# Patient Record
Sex: MALE | Race: WHITE | NOT HISPANIC OR LATINO | Employment: OTHER | URBAN - METROPOLITAN AREA
[De-identification: names, ages, dates, MRNs, and addresses within clinical notes are randomized per-mention and may not be internally consistent; named-entity substitution may affect disease eponyms.]

---

## 2017-11-03 ENCOUNTER — HOSPITAL ENCOUNTER (EMERGENCY)
Facility: HOSPITAL | Age: 72
Discharge: HOME | End: 2017-11-03
Attending: EMERGENCY MEDICINE | Admitting: EMERGENCY MEDICINE
Payer: MEDICARE

## 2017-11-03 ENCOUNTER — APPOINTMENT (EMERGENCY)
Dept: CT IMAGING | Facility: HOSPITAL | Age: 72
End: 2017-11-03
Payer: MEDICARE

## 2017-11-03 VITALS
HEART RATE: 60 BPM | BODY MASS INDEX: 22.28 KG/M2 | WEIGHT: 147 LBS | RESPIRATION RATE: 17 BRPM | OXYGEN SATURATION: 99 % | SYSTOLIC BLOOD PRESSURE: 122 MMHG | TEMPERATURE: 96.9 F | HEIGHT: 68 IN | DIASTOLIC BLOOD PRESSURE: 61 MMHG

## 2017-11-03 DIAGNOSIS — N30.90 CYSTITIS: Primary | ICD-10-CM

## 2017-11-03 DIAGNOSIS — K57.90 DIVERTICULOSIS: ICD-10-CM

## 2017-11-03 LAB
ANION GAP SERPL CALCULATED.3IONS-SCNC: 4 MMOL/L (ref 4–13)
BACTERIA UR QL AUTO: ABNORMAL /HPF
BASOPHILS # BLD AUTO: 0.04 THOUSANDS/ΜL (ref 0–0.1)
BASOPHILS NFR BLD AUTO: 1 % (ref 0–1)
BILIRUB UR QL STRIP: NEGATIVE
BUN SERPL-MCNC: 14 MG/DL (ref 5–25)
CALCIUM SERPL-MCNC: 9.3 MG/DL (ref 8.3–10.1)
CHLORIDE SERPL-SCNC: 103 MMOL/L (ref 100–108)
CLARITY UR: ABNORMAL
CO2 SERPL-SCNC: 32 MMOL/L (ref 21–32)
COLOR UR: ABNORMAL
CREAT SERPL-MCNC: 0.97 MG/DL (ref 0.6–1.3)
EOSINOPHIL # BLD AUTO: 0.35 THOUSAND/ΜL (ref 0–0.61)
EOSINOPHIL NFR BLD AUTO: 4 % (ref 0–6)
ERYTHROCYTE [DISTWIDTH] IN BLOOD BY AUTOMATED COUNT: 12.7 % (ref 11.6–15.1)
GFR SERPL CREATININE-BSD FRML MDRD: 77 ML/MIN/1.73SQ M
GLUCOSE SERPL-MCNC: 149 MG/DL (ref 65–140)
GLUCOSE UR STRIP-MCNC: NEGATIVE MG/DL
HCT VFR BLD AUTO: 42.6 % (ref 36.5–49.3)
HGB BLD-MCNC: 14.4 G/DL (ref 12–17)
HGB UR QL STRIP.AUTO: ABNORMAL
KETONES UR STRIP-MCNC: NEGATIVE MG/DL
LEUKOCYTE ESTERASE UR QL STRIP: ABNORMAL
LYMPHOCYTES # BLD AUTO: 2.16 THOUSANDS/ΜL (ref 0.6–4.47)
LYMPHOCYTES NFR BLD AUTO: 25 % (ref 14–44)
MCH RBC QN AUTO: 31.6 PG (ref 26.8–34.3)
MCHC RBC AUTO-ENTMCNC: 33.8 G/DL (ref 31.4–37.4)
MCV RBC AUTO: 94 FL (ref 82–98)
MONOCYTES # BLD AUTO: 0.58 THOUSAND/ΜL (ref 0.17–1.22)
MONOCYTES NFR BLD AUTO: 7 % (ref 4–12)
NEUTROPHILS # BLD AUTO: 5.53 THOUSANDS/ΜL (ref 1.85–7.62)
NEUTS SEG NFR BLD AUTO: 63 % (ref 43–75)
NITRITE UR QL STRIP: NEGATIVE
NON-SQ EPI CELLS URNS QL MICRO: ABNORMAL /HPF
OTHER STN SPEC: ABNORMAL
PH UR STRIP.AUTO: 7.5 [PH] (ref 4.5–8)
PLATELET # BLD AUTO: 305 THOUSANDS/UL (ref 149–390)
PMV BLD AUTO: 9.2 FL (ref 8.9–12.7)
POTASSIUM SERPL-SCNC: 4.4 MMOL/L (ref 3.5–5.3)
PROT UR STRIP-MCNC: ABNORMAL MG/DL
RBC # BLD AUTO: 4.55 MILLION/UL (ref 3.88–5.62)
RBC #/AREA URNS AUTO: ABNORMAL /HPF
SODIUM SERPL-SCNC: 139 MMOL/L (ref 136–145)
SP GR UR STRIP.AUTO: 1.01 (ref 1–1.03)
URINE COMMENT: ABNORMAL
UROBILINOGEN UR QL STRIP.AUTO: 0.2 E.U./DL
WBC # BLD AUTO: 8.66 THOUSAND/UL (ref 4.31–10.16)
WBC #/AREA URNS AUTO: ABNORMAL /HPF

## 2017-11-03 PROCEDURE — 85025 COMPLETE CBC W/AUTO DIFF WBC: CPT | Performed by: EMERGENCY MEDICINE

## 2017-11-03 PROCEDURE — 36415 COLL VENOUS BLD VENIPUNCTURE: CPT | Performed by: EMERGENCY MEDICINE

## 2017-11-03 PROCEDURE — 81001 URINALYSIS AUTO W/SCOPE: CPT

## 2017-11-03 PROCEDURE — 80048 BASIC METABOLIC PNL TOTAL CA: CPT | Performed by: EMERGENCY MEDICINE

## 2017-11-03 PROCEDURE — 87086 URINE CULTURE/COLONY COUNT: CPT

## 2017-11-03 PROCEDURE — 99284 EMERGENCY DEPT VISIT MOD MDM: CPT

## 2017-11-03 PROCEDURE — 74177 CT ABD & PELVIS W/CONTRAST: CPT

## 2017-11-03 RX ORDER — MULTIVITAMIN
1 CAPSULE ORAL DAILY
COMMUNITY

## 2017-11-03 RX ORDER — ASPIRIN 81 MG/1
81 TABLET, CHEWABLE ORAL DAILY
COMMUNITY

## 2017-11-03 RX ORDER — CEPHALEXIN 250 MG/1
500 CAPSULE ORAL ONCE
Status: COMPLETED | OUTPATIENT
Start: 2017-11-03 | End: 2017-11-03

## 2017-11-03 RX ORDER — CEPHALEXIN 500 MG/1
500 CAPSULE ORAL 4 TIMES DAILY
Qty: 28 CAPSULE | Refills: 0 | Status: SHIPPED | OUTPATIENT
Start: 2017-11-03 | End: 2017-11-10

## 2017-11-03 RX ORDER — CHLORAL HYDRATE 500 MG
1000 CAPSULE ORAL DAILY
COMMUNITY

## 2017-11-03 RX ORDER — LEVOTHYROXINE SODIUM 0.03 MG/1
100 TABLET ORAL DAILY
COMMUNITY

## 2017-11-03 RX ORDER — EZETIMIBE 10 MG/1
10 TABLET ORAL DAILY
COMMUNITY

## 2017-11-03 RX ORDER — ATORVASTATIN CALCIUM 80 MG/1
80 TABLET, FILM COATED ORAL DAILY
COMMUNITY

## 2017-11-03 RX ADMIN — IOHEXOL 100 ML: 350 INJECTION, SOLUTION INTRAVENOUS at 09:33

## 2017-11-03 RX ADMIN — CEPHALEXIN 500 MG: 250 CAPSULE ORAL at 09:58

## 2017-11-03 NOTE — ED PROVIDER NOTES
History  Chief Complaint   Patient presents with    Blood in Urine     Patient reports hematuria weith small red clots since this morning  Admits to burning, urgency, and frequency with urination  Denies abdominal or suprapubic pain  Denies hx of problems with prostate  Denies fever, chills, n, v, d       History provided by:  Patient   used: No        Patient is a 44-year-old male presenting to emergency department with hematuria  Started today  Gross hematuria  Burning  No fevers or chills  No nausea or vomiting  No chest pain  Does feel lightheaded  Not on blood thinners   Except for aspirin  No trauma  No history of malignancy  Former smoker  No bladder or kidney problems in the past       MDM  Concern for renal mass, will check urine, CT abdomen pelvis to evaluate for renal and bladder masses, CBC BMP      Prior to Admission Medications   Prescriptions Last Dose Informant Patient Reported? Taking? Multiple Vitamin (MULTIVITAMIN) capsule   Yes Yes   Sig: Take 1 capsule by mouth daily   Omega-3 Fatty Acids (FISH OIL) 1,000 mg   Yes Yes   Sig: Take 1,000 mg by mouth daily   aspirin 81 mg chewable tablet   Yes Yes   Sig: Chew 81 mg daily   atorvastatin (LIPITOR) 80 mg tablet   Yes Yes   Sig: Take 80 mg by mouth daily   co-enzyme Q-10 30 MG capsule   Yes Yes   Sig: Take 100 mg by mouth daily   ezetimibe (ZETIA) 10 mg tablet   Yes Yes   Sig: Take 10 mg by mouth daily   levothyroxine 25 mcg tablet   Yes Yes   Sig: Take 25 mcg by mouth daily   metoprolol tartrate (LOPRESSOR) 25 mg tablet   Yes Yes   Sig: Take 25 mg by mouth every 12 (twelve) hours      Facility-Administered Medications: None       Past Medical History:   Diagnosis Date    Disease of thyroid gland     Hyperlipidemia     Hypertension        Past Surgical History:   Procedure Laterality Date    HERNIA REPAIR         History reviewed  No pertinent family history    I have reviewed and agree with the history as documented  Social History   Substance Use Topics    Smoking status: Former Smoker    Smokeless tobacco: Never Used    Alcohol use Yes      Comment: socially         Review of Systems   Constitutional: Negative for chills, diaphoresis and fever  HENT: Negative for congestion and sore throat  Respiratory: Negative for cough, shortness of breath, wheezing and stridor  Cardiovascular: Negative for chest pain, palpitations and leg swelling  Gastrointestinal: Negative for abdominal pain, blood in stool, diarrhea, nausea and vomiting  Genitourinary: Positive for dysuria and hematuria  Negative for frequency and urgency  Musculoskeletal: Negative for back pain, neck pain and neck stiffness  Skin: Negative for pallor and rash  Neurological: Negative for dizziness, syncope, weakness, light-headedness and headaches  All other systems reviewed and are negative  Physical Exam  ED Triage Vitals [11/03/17 0759]   Temperature Pulse Respirations Blood Pressure SpO2   (!) 96 9 °F (36 1 °C) 68 18 154/74 97 %      Temp Source Heart Rate Source Patient Position - Orthostatic VS BP Location FiO2 (%)   Temporal Monitor Sitting Right arm --      Pain Score       No Pain           Orthostatic Vital Signs  Vitals:    11/03/17 0759 11/03/17 0900   BP: 154/74 122/61   Pulse: 68 60   Patient Position - Orthostatic VS: Sitting Sitting       Physical Exam   Constitutional: He is oriented to person, place, and time  He appears well-developed and well-nourished  HENT:   Head: Normocephalic and atraumatic  Eyes: EOM are normal  Pupils are equal, round, and reactive to light  Neck: Normal range of motion  Neck supple  Cardiovascular: Normal rate, regular rhythm, normal heart sounds and intact distal pulses  Pulmonary/Chest: Effort normal and breath sounds normal  No respiratory distress  Abdominal: Soft  Bowel sounds are normal  There is no tenderness  Musculoskeletal: Normal range of motion   He exhibits no edema or tenderness  Neurological: He is alert and oriented to person, place, and time  Skin: Skin is warm and dry  Capillary refill takes less than 2 seconds  No erythema  No pallor  Vitals reviewed  ED Medications  Medications   iohexol (OMNIPAQUE) 350 MG/ML injection (MULTI-DOSE) 100 mL (100 mL Intravenous Given 11/3/17 0933)   cephalexin (KEFLEX) capsule 500 mg (500 mg Oral Given 11/3/17 0958)       Diagnostic Studies  Results Reviewed     Procedure Component Value Units Date/Time    Urine Microscopic [[id number]]  (Abnormal) Collected:  11/03/17 0809    Lab Status:  Final result Specimen:  Urine from Urine, Clean Catch Updated:  11/03/17 0856     RBC, UA Innumerable (A) /hpf      WBC, UA 30-50 (A) /hpf      Epithelial Cells Occasional /hpf      Bacteria, UA Occasional /hpf      OTHER OBSERVATIONS WBCs Clumped  Renal Epithelial Cells Present  Renal Tubule Epithelial Cells Present    Urine culture [[id number]] Collected:  11/03/17 0809    Lab Status:   In process Specimen:  Urine from Urine, Clean Catch Updated:  11/03/17 0856    UA w Reflex to Microscopic w Reflex to Culture [[id number]]  (Abnormal) Collected:  11/03/17 0809    Lab Status:  Final result Specimen:  Urine from Urine, Clean Catch Updated:  11/03/17 0851     Color, UA Red     Clarity, UA Turbid     Specific Holden, UA 1 010     pH, UA 7 5     Leukocytes, UA Small (A)     Nitrite, UA Negative     Protein, UA >=1000 (4+) (A) mg/dl      Glucose, UA Negative mg/dl      Ketones, UA Negative mg/dl      Urobilinogen, UA 0 2 E U /dl      Bilirubin, UA Negative     Blood, UA Large (A)     URINE COMMENT Dipstick results analyzed manually on supernatant due to gross amount of blood present-byron    Basic metabolic panel [[id number]]  (Abnormal) Collected:  11/03/17 0820    Lab Status:  Final result Specimen:  Blood from Arm, Left Updated:  11/03/17 0844     Sodium 139 mmol/L      Potassium 4 4 mmol/L      Chloride 103 mmol/L      CO2 32 mmol/L      Anion Gap 4 mmol/L      BUN 14 mg/dL      Creatinine 0 97 mg/dL      Glucose 149 (H) mg/dL      Calcium 9 3 mg/dL      eGFR 77 ml/min/1 73sq m     Narrative:         National Kidney Disease Education Program recommendations are as follows:  GFR calculation is accurate only with a steady state creatinine  Chronic Kidney disease less than 60 ml/min/1 73 sq  meters  Kidney failure less than 15 ml/min/1 73 sq  meters  CBC and differential [[id number]]  (Normal) Collected:  11/03/17 0820    Lab Status:  Final result Specimen:  Blood from Arm, Left Updated:  11/03/17 0833     WBC 8 66 Thousand/uL      RBC 4 55 Million/uL      Hemoglobin 14 4 g/dL      Hematocrit 42 6 %      MCV 94 fL      MCH 31 6 pg      MCHC 33 8 g/dL      RDW 12 7 %      MPV 9 2 fL      Platelets 920 Thousands/uL      Neutrophils Relative 63 %      Lymphocytes Relative 25 %      Monocytes Relative 7 %      Eosinophils Relative 4 %      Basophils Relative 1 %      Neutrophils Absolute 5 53 Thousands/µL      Lymphocytes Absolute 2 16 Thousands/µL      Monocytes Absolute 0 58 Thousand/µL      Eosinophils Absolute 0 35 Thousand/µL      Basophils Absolute 0 04 Thousands/µL                  CT abdomen pelvis with contrast   Final Result by Mavis Daily, DO (11/03 1026)      Thickening of the urinary bladder wall suspicious for cystitis  Correlate with urinalysis  Small right posterolateral bladder wall diverticulum  2 mm nonobstructing right renal calculus  Descending and sigmoid colon diverticulosis without evidence of diverticulitis           Workstation performed: [id number]                    Procedures  Procedures       Phone Contacts  ED Phone Contact    ED Course  ED Course                                MDM  CritCare Time    Disposition  Final diagnoses:   Cystitis   Diverticulosis     Time reflects when diagnosis was documented in both MDM as applicable and the Disposition within this note     Time User Action Codes Description Comment 11/3/2017  9:50 AM Ava Salmeron Add [N30 90] Cystitis     11/3/2017  9:50 AM Ava Hernandez Add [K57 90] Diverticulosis       ED Disposition     ED Disposition Condition Comment    Discharge  Deneise Boyle discharge to home/self care  Condition at discharge: Good        Follow-up Information     Follow up With Specialties Details Why Contact Info Additional Information    [facility] Emergency Department Emergency Medicine  As needed, If symptoms worsen, pain, vomiting, fevers, chills, lightheaded or feeling worse overall [address]  [address] [facility] [address], [place], [place], [address] for Urology [place]  In 1 week  re-evaluation  of bladder [address]    [address]      Alem Garay MD Family Medicine In 3 days  if unable to get a appointment Urology [address] [facility] [address]           Discharge Medication List as of 11/3/2017  9:52 AM      START taking these medications    Details   cephalexin (KEFLEX) 500 mg capsule Take 1 capsule by mouth 4 (four) times a day for 7 days, Starting Fri 11/3/2017, Until Fri 11/10/2017, Print         CONTINUE these medications which have NOT CHANGED    Details   aspirin 81 mg chewable tablet Chew 81 mg daily, Historical Med      atorvastatin (LIPITOR) 80 mg tablet Take 80 mg by mouth daily, Historical Med      co-enzyme Q-10 30 MG capsule Take 100 mg by mouth daily, Historical Med      ezetimibe (ZETIA) 10 mg tablet Take 10 mg by mouth daily, Historical Med      levothyroxine 25 mcg tablet Take 25 mcg by mouth daily, Historical Med      metoprolol tartrate (LOPRESSOR) 25 mg tablet Take 25 mg by mouth every 12 (twelve) hours, Historical Med      Multiple Vitamin (MULTIVITAMIN) capsule Take 1 capsule by mouth daily, Historical Med      Omega-3 Fatty Acids (FISH OIL) 1,000 mg Take 1,000 mg by mouth daily, Historical Med           No discharge procedures on file      ED Provider  Electronically Signed by           David Saenz MD  11/03/17 1007

## 2017-11-03 NOTE — DISCHARGE INSTRUCTIONS
Urinary Tract Infection in Men   WHAT YOU NEED TO KNOW:   What is a urinary tract infection (UTI)? A UTI is caused by bacteria that get inside your urinary tract  Most bacteria that enter your urinary tract come out when you urinate  If the bacteria stay in your urinary tract, you may get an infection  Your urinary tract includes your kidneys, ureters, bladder, and urethra  Urine is made in your kidneys, and it flows from the ureters to the bladder  Urine leaves the bladder through the urethra  A UTI is more common in your lower urinary tract, which includes your bladder and urethra  What increases my risk for a UTI? · A urinary catheter or self-catheterization    · Incontinence (not able to control when you urinate)    · Urinary tract problems, such as narrowing, kidney stones, or not being able to empty your bladder completely    · Not being circumcised    · Past UTI or urinary tract surgery    · Elderly age    · Obesity or diabetes  What are the signs and symptoms of a UTI? · Urinating more often than usual, leaking urine, or waking from sleep to urinate    · Pain or burning when you urinate    · Pain or pressure in your lower abdomen or back    · Urine that smells bad    · Blood in your urine  How is a UTI diagnosed? Your healthcare provider will ask about your signs and symptoms  Your provider may press on your abdomen, sides, and back to check if you feel pain  Your urine will be tested for bacteria that may be causing your infection  If you have UTIs often, you may need other tests to find the cause  How is a UTI treated? · Antibiotics  help fight a bacterial infection  · Medicines  may be given to decrease pain and burning when you urinate  They will also help decrease the feeling that you need to urinate often  These medicines will make your urine orange or red  What can I do to prevent a UTI? · Empty your bladder often    Urinate and empty your bladder as soon as you feel the need  Do not hold your urine for long periods of time  · Drink liquids as directed  Ask how much liquid to drink each day and which liquids are best for you  You may need to drink more liquids than usual to help flush out the bacteria  Do not drink alcohol, caffeine, or citrus juices  These can irritate your bladder and increase your symptoms  Your healthcare provider may recommend cranberry juice to help prevent a UTI  · Urinate after you have sex  This can help flush out bacteria passed during sex  · Do pelvic muscle exercises often  Pelvic muscle exercises may help you start and stop urinating  Strong pelvic muscles may help you empty your bladder easier  Squeeze these muscles tightly for 5 seconds like you are trying to hold back urine  Then relax for 5 seconds  Gradually work up to squeezing for 10 seconds  Do 3 sets of 15 repetitions a day, or as directed  When should I seek immediate care? · You are urinating very little or not at all  · You have a high fever with shaking chills  · You have side or back pain that gets worse  When should I contact my healthcare provider? · You have a mild fever  · You do not feel better after 2 days of taking antibiotics  · You are vomiting  · You have new symptoms, such as blood or pus in your urine  · You have questions or concerns about your condition or care  CARE AGREEMENT:   You have the right to help plan your care  Learn about your health condition and how it may be treated  Discuss treatment options with your caregivers to decide what care you want to receive  You always have the right to refuse treatment  The above information is an  only  It is not intended as medical advice for individual conditions or treatments  Talk to your doctor, nurse or pharmacist before following any medical regimen to see if it is safe and effective for you    © 2017 [employer] Information is for End User's use only and may not be sold, redistributed or otherwise used for commercial purposes  All illustrations and images included in CareNotes® are the copyrighted property of A D A M , Inc  or [facility]

## 2017-11-03 NOTE — ED NOTES
Patient resting comfortably in bed without difficulty  Will continue to monitor        Mohamud Florez, NEYDA  11/03/17 7840

## 2017-11-04 LAB — BACTERIA UR CULT: NORMAL

## 2018-03-21 ENCOUNTER — HOSPITAL ENCOUNTER (EMERGENCY)
Facility: HOSPITAL | Age: 73
Discharge: HOME | End: 2018-03-21
Attending: EMERGENCY MEDICINE
Payer: MEDICARE

## 2018-03-21 VITALS
OXYGEN SATURATION: 98 % | DIASTOLIC BLOOD PRESSURE: 71 MMHG | HEART RATE: 75 BPM | WEIGHT: 160 LBS | BODY MASS INDEX: 24.33 KG/M2 | TEMPERATURE: 98.5 F | SYSTOLIC BLOOD PRESSURE: 136 MMHG | RESPIRATION RATE: 16 BRPM

## 2018-03-21 DIAGNOSIS — F41.9 ANXIETY: Primary | ICD-10-CM

## 2018-03-21 DIAGNOSIS — E03.9 HYPOTHYROIDISM: ICD-10-CM

## 2018-03-21 LAB
ANION GAP SERPL CALCULATED.3IONS-SCNC: 11 MMOL/L (ref 4–13)
BASOPHILS # BLD AUTO: 0.04 THOUSANDS/ΜL (ref 0–0.1)
BASOPHILS NFR BLD AUTO: 1 % (ref 0–1)
BUN SERPL-MCNC: 14 MG/DL (ref 5–25)
CALCIUM SERPL-MCNC: 9.2 MG/DL (ref 8.3–10.1)
CHLORIDE SERPL-SCNC: 102 MMOL/L (ref 100–108)
CO2 SERPL-SCNC: 28 MMOL/L (ref 21–32)
CREAT SERPL-MCNC: 1 MG/DL (ref 0.6–1.3)
EOSINOPHIL # BLD AUTO: 0.13 THOUSAND/ΜL (ref 0–0.61)
EOSINOPHIL NFR BLD AUTO: 2 % (ref 0–6)
ERYTHROCYTE [DISTWIDTH] IN BLOOD BY AUTOMATED COUNT: 12.9 % (ref 11.6–15.1)
GFR SERPL CREATININE-BSD FRML MDRD: 74 ML/MIN/1.73SQ M
GLUCOSE SERPL-MCNC: 206 MG/DL (ref 65–140)
HCT VFR BLD AUTO: 43.5 % (ref 36.5–49.3)
HGB BLD-MCNC: 15 G/DL (ref 12–17)
LYMPHOCYTES # BLD AUTO: 3.41 THOUSANDS/ΜL (ref 0.6–4.47)
LYMPHOCYTES NFR BLD AUTO: 46 % (ref 14–44)
MCH RBC QN AUTO: 32.2 PG (ref 26.8–34.3)
MCHC RBC AUTO-ENTMCNC: 34.5 G/DL (ref 31.4–37.4)
MCV RBC AUTO: 93 FL (ref 82–98)
MONOCYTES # BLD AUTO: 0.66 THOUSAND/ΜL (ref 0.17–1.22)
MONOCYTES NFR BLD AUTO: 9 % (ref 4–12)
NEUTROPHILS # BLD AUTO: 3.06 THOUSANDS/ΜL (ref 1.85–7.62)
NEUTS SEG NFR BLD AUTO: 42 % (ref 43–75)
PLATELET # BLD AUTO: 248 THOUSANDS/UL (ref 149–390)
PMV BLD AUTO: 9.7 FL (ref 8.9–12.7)
POTASSIUM SERPL-SCNC: 3.9 MMOL/L (ref 3.5–5.3)
RBC # BLD AUTO: 4.66 MILLION/UL (ref 3.88–5.62)
SODIUM SERPL-SCNC: 141 MMOL/L (ref 136–145)
TSH SERPL DL<=0.05 MIU/L-ACNC: 7.64 UIU/ML (ref 0.36–3.74)
WBC # BLD AUTO: 7.3 THOUSAND/UL (ref 4.31–10.16)

## 2018-03-21 PROCEDURE — 85025 COMPLETE CBC W/AUTO DIFF WBC: CPT | Performed by: EMERGENCY MEDICINE

## 2018-03-21 PROCEDURE — 36415 COLL VENOUS BLD VENIPUNCTURE: CPT | Performed by: EMERGENCY MEDICINE

## 2018-03-21 PROCEDURE — 80048 BASIC METABOLIC PNL TOTAL CA: CPT | Performed by: EMERGENCY MEDICINE

## 2018-03-21 PROCEDURE — 84443 ASSAY THYROID STIM HORMONE: CPT | Performed by: EMERGENCY MEDICINE

## 2018-03-21 PROCEDURE — 99283 EMERGENCY DEPT VISIT LOW MDM: CPT

## 2018-03-22 NOTE — DISCHARGE INSTRUCTIONS
Anxiety   WHAT YOU NEED TO KNOW:   Anxiety is a condition that causes you to feel extremely worried or nervous  The feelings are so strong that they can cause problems with your daily activities or sleep  Anxiety may be triggered by something you fear, or it may happen without a cause  Family or work stress, smoking, caffeine, and alcohol can increase your risk for anxiety  Certain medicines or health conditions can also increase your risk  Anxiety can become a long-term condition if it is not managed or treated  YOU MAY FIND RELIEF BY PRACTICING MINDFUL MEDITATION  LOOK UP THE WORKS OF Marlene Lawrence  HE AND OTHERS OFFER BOOKS, WORKSHOPS AND HOME BASED MEDITATION TRAINING  DISCHARGE INSTRUCTIONS:   Call 911 if:   · You have chest pain, tightness, or heaviness that may spread to your shoulders, arms, jaw, neck, or back  · You feel like hurting yourself or someone else  Contact your healthcare provider if:   · Your symptoms get worse or do not get better with treatment  · Your anxiety keeps you from doing your regular daily activities  · You have new symptoms since your last visit  · You have questions or concerns about your condition or care  Medicines:   · Medicines  may be given to help you feel more calm and relaxed, and decrease your symptoms  · Take your medicine as directed  Contact your healthcare provider if you think your medicine is not helping or if you have side effects  Tell him of her if you are allergic to any medicine  Keep a list of the medicines, vitamins, and herbs you take  Include the amounts, and when and why you take them  Bring the list or the pill bottles to follow-up visits  Carry your medicine list with you in case of an emergency  Follow up with your healthcare provider within 2 weeks or as directed:  Write down your questions so you remember to ask them during your visits  Manage anxiety:   · Talk to someone about your anxiety    Your healthcare provider may suggest counseling  Cognitive behavioral therapy can help you understand and change how you react to events that trigger your symptoms  You might feel more comfortable talking with a friend or family member about your anxiety  Choose someone you know will be supportive and encouraging  · Find ways to relax  Activities such as exercise, meditation, or listening to music can help you relax  Spend time with friends, or do things you enjoy  · Practice deep breathing  Deep breathing can help you relax when you feel anxious  Focus on taking slow, deep breaths several times a day, or during an anxiety attack  Breathe in through your nose and out through your mouth  · Create a regular sleep routine  Regular sleep can help you feel calmer during the day  Go to sleep and wake up at the same times every day  Do not watch television or use the computer right before bed  Your room should be comfortable, dark, and quiet  · Eat a variety of healthy foods  Healthy foods include fruits, vegetables, low-fat dairy products, lean meats, fish, whole-grain breads, and cooked beans  Healthy foods can help you feel less anxious and have more energy  · Exercise regularly  Exercise can increase your energy level  Exercise may also lift your mood and help you sleep better  Your healthcare provider can help you create an exercise plan  · Do not smoke  Nicotine and other chemicals in cigarettes and cigars can increase anxiety  Ask your healthcare provider for information if you currently smoke and need help to quit  E-cigarettes or smokeless tobacco still contain nicotine  Talk to your healthcare provider before you use these products  · Do not have caffeine  Caffeine can make your symptoms worse  Do not have foods or drinks that are meant to increase your energy level  · Limit or do not drink alcohol  Ask your healthcare provider if alcohol is safe for you   You may not be able to drink alcohol if you take certain anxiety or depression medicines  Limit alcohol to 1 drink per day if you are a woman  Limit alcohol to 2 drinks per day if you are a man  A drink of alcohol is 12 ounces of beer, 5 ounces of wine, or 1½ ounces of liquor  · Do not use drugs  Drugs can make your anxiety worse  It can also make anxiety hard to manage  Talk to your healthcare provider if you use drugs and want help to quit  © 2017 [facility] Information is for End User's use only and may not be sold, redistributed or otherwise used for commercial purposes  All illustrations and images included in CareNotes® are the copyrighted property of A D A M , Inc  or Jose Luis Chávez  The above information is an  only  It is not intended as medical advice for individual conditions or treatments  Talk to your doctor, nurse or pharmacist before following any medical regimen to see if it is safe and effective for you

## 2018-03-22 NOTE — ED NOTES
Pt has had no shaking since I took his care at 2300  He verbalized understanding to follow up with his PCP regarding his lab results, mainly TSH       Sarah Rossi RN  03/21/18 0474
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
## 2018-12-17 ENCOUNTER — HOSPITAL ENCOUNTER (EMERGENCY)
Facility: HOSPITAL | Age: 73
Discharge: HOME | End: 2018-12-17
Attending: EMERGENCY MEDICINE
Payer: MEDICARE

## 2018-12-17 VITALS
BODY MASS INDEX: 22.5 KG/M2 | WEIGHT: 148 LBS | HEART RATE: 63 BPM | DIASTOLIC BLOOD PRESSURE: 67 MMHG | OXYGEN SATURATION: 97 % | RESPIRATION RATE: 20 BRPM | SYSTOLIC BLOOD PRESSURE: 135 MMHG

## 2018-12-17 DIAGNOSIS — T78.40XA ALLERGIC REACTION, INITIAL ENCOUNTER: Primary | ICD-10-CM

## 2018-12-17 PROCEDURE — 99283 EMERGENCY DEPT VISIT LOW MDM: CPT

## 2018-12-17 RX ORDER — PREDNISONE 20 MG/1
20 TABLET ORAL DAILY
Qty: 8 TABLET | Refills: 0 | Status: SHIPPED | OUTPATIENT
Start: 2018-12-17 | End: 2018-12-21

## 2018-12-17 RX ORDER — FAMOTIDINE 20 MG/1
20 TABLET, FILM COATED ORAL 2 TIMES DAILY
Qty: 4 TABLET | Refills: 0 | Status: SHIPPED | OUTPATIENT
Start: 2018-12-17 | End: 2019-08-12

## 2018-12-17 RX ORDER — PYRIDOXINE HCL (VITAMIN B6) 100 MG
500 TABLET ORAL DAILY
COMMUNITY

## 2018-12-17 RX ORDER — DIPHENHYDRAMINE HCL 25 MG
50 TABLET ORAL ONCE
Status: COMPLETED | OUTPATIENT
Start: 2018-12-17 | End: 2018-12-17

## 2018-12-17 RX ORDER — EPINEPHRINE 0.3 MG/.3ML
0.3 INJECTION SUBCUTANEOUS ONCE
Qty: 2 EACH | Refills: 0 | Status: SHIPPED | OUTPATIENT
Start: 2018-12-17 | End: 2018-12-17

## 2018-12-17 RX ORDER — PREDNISONE 20 MG/1
40 TABLET ORAL ONCE
Status: COMPLETED | OUTPATIENT
Start: 2018-12-17 | End: 2018-12-17

## 2018-12-17 RX ORDER — FAMOTIDINE 20 MG/1
20 TABLET, FILM COATED ORAL ONCE
Status: COMPLETED | OUTPATIENT
Start: 2018-12-17 | End: 2018-12-17

## 2018-12-17 RX ORDER — DIPHENHYDRAMINE HCL 25 MG
50 TABLET ORAL EVERY 8 HOURS PRN
Qty: 20 TABLET | Refills: 0 | Status: SHIPPED | OUTPATIENT
Start: 2018-12-17

## 2018-12-17 RX ADMIN — PREDNISONE 40 MG: 20 TABLET ORAL at 22:41

## 2018-12-17 RX ADMIN — DIPHENHYDRAMINE HCL 50 MG: 25 TABLET ORAL at 22:40

## 2018-12-17 RX ADMIN — FAMOTIDINE 20 MG: 20 TABLET ORAL at 22:40

## 2018-12-18 NOTE — ED PROVIDER NOTES
History  Chief Complaint   Patient presents with    Allergic Reaction     pt states 2030 his bottom lip swelled up after eating cookies  Pt also states rash began on back of right thigh  69-year-old male presents for evaluation of pruritic rash to the buttocks and swelling of the bottom lip shortly after eating cookies the by a family member at 8:30 p m  this evening  Patient denies history of similar reactions in the past   No known allergies  He does not take any ACE inhibitors  No recent medication changes; however, patient has recently started taking cranberry extract  Patient denies any shortness of breath, tongue or throat swelling  No nausea or vomiting  No diarrhea  No recent illness  History provided by:  Patient  Allergic Reaction   Presenting symptoms: itching, rash and swelling    Itching:     Location: buttocks  Severity:  Mild    Onset quality:  Sudden    Duration:  2 hours    Timing:  Constant    Progression:  Unchanged  Rash:     Location: buttocks  Quality: itchiness and redness      Severity:  Mild    Onset quality:  Sudden    Duration:  2 hours    Timing:  Constant  Swelling:     Location: lower lip  Onset quality:  Sudden    Duration:  2 hours    Timing:  Constant    Progression:  Unchanged    Chronicity:  New  Severity:  Mild  Duration:  2 hours  Prior allergic episodes:  No prior episodes  Context comment:  Homemade cookies  Relieved by:  None tried  Worsened by:  Nothing  Ineffective treatments:  None tried      Prior to Admission Medications   Prescriptions Last Dose Informant Patient Reported? Taking?    Cranberry 500 MG CAPS   Yes Yes   Sig: Take 500 mg by mouth daily   Multiple Vitamin (MULTIVITAMIN) capsule   Yes No   Sig: Take 1 capsule by mouth daily   Omega-3 Fatty Acids (FISH OIL) 1,000 mg   Yes No   Sig: Take 1,000 mg by mouth daily   aspirin 81 mg chewable tablet   Yes No   Sig: Chew 81 mg daily   atorvastatin (LIPITOR) 80 mg tablet   Yes No   Sig: Take 80 mg by mouth daily   co-enzyme Q-10 30 MG capsule   Yes No   Sig: Take 100 mg by mouth daily   ezetimibe (ZETIA) 10 mg tablet   Yes No   Sig: Take 10 mg by mouth daily   levothyroxine 25 mcg tablet   Yes No   Sig: Take 25 mcg by mouth daily   metoprolol tartrate (LOPRESSOR) 25 mg tablet   Yes No   Sig: Take 25 mg by mouth every 12 (twelve) hours      Facility-Administered Medications: None       Past Medical History:   Diagnosis Date    Disease of thyroid gland     Hematuria     Hyperlipidemia     Hypertension        Past Surgical History:   Procedure Laterality Date    HERNIA REPAIR         History reviewed  No pertinent family history  I have reviewed and agree with the history as documented  Social History   Substance Use Topics    Smoking status: Former Smoker    Smokeless tobacco: Never Used    Alcohol use No        Review of Systems   Constitutional: Negative for appetite change, diaphoresis, fatigue and fever  HENT: Negative for congestion, rhinorrhea and sore throat  Respiratory: Negative for cough, chest tightness and shortness of breath  Cardiovascular: Negative for chest pain, palpitations and leg swelling  Gastrointestinal: Negative for abdominal pain, constipation, diarrhea, nausea and vomiting  Genitourinary: Negative for difficulty urinating, dysuria, frequency and hematuria  Musculoskeletal: Negative for myalgias, neck pain and neck stiffness  Skin: Positive for itching and rash  Negative for pallor  Neurological: Negative for syncope, weakness and headaches  All other systems reviewed and are negative  Physical Exam  Physical Exam   Constitutional: He appears well-developed and well-nourished  Non-toxic appearance  No distress  HENT:   Head: Normocephalic and atraumatic  Mouth/Throat: Uvula is midline and oropharynx is clear and moist    Swelling of the lower lip   Eyes: Pupils are equal, round, and reactive to light   EOM are normal    Neck: Normal range of motion  No tracheal deviation present  No thyromegaly present  Cardiovascular: Normal rate, regular rhythm, normal heart sounds and intact distal pulses  Pulmonary/Chest: Effort normal and breath sounds normal    Abdominal: Soft  Bowel sounds are normal  He exhibits no distension  There is no tenderness  Lymphadenopathy:     He has no cervical adenopathy  Skin: Skin is warm and dry  He is not diaphoretic  Erythematous, blanchable, wheal and flare rash to the inferior portion of bilateral buttocks extending to the upper thigh   Nursing note and vitals reviewed  Vital Signs  ED Triage Vitals   Temp Pulse Respirations Blood Pressure SpO2   -- 12/17/18 2223 12/17/18 2223 12/17/18 2223 12/17/18 2223    68 18 (!) 176/78 99 %      Temp src Heart Rate Source Patient Position - Orthostatic VS BP Location FiO2 (%)   -- 12/17/18 2223 12/17/18 2237 12/17/18 2237 --    Monitor Lying Right arm       Pain Score       12/17/18 2223       No Pain           Vitals:    12/17/18 2223 12/17/18 2237 12/17/18 2245 12/17/18 2300   BP: (!) 176/78   135/67   Pulse: 68   63   Patient Position - Orthostatic VS:  Lying Lying Lying       Visual Acuity      ED Medications  Medications   diphenhydrAMINE (BENADRYL) tablet 50 mg (50 mg Oral Given 12/17/18 2240)   famotidine (PEPCID) tablet 20 mg (20 mg Oral Given 12/17/18 2240)   predniSONE tablet 40 mg (40 mg Oral Given 12/17/18 2241)       Diagnostic Studies  Results Reviewed     None                 No orders to display              Procedures  Procedures       Phone Contacts  ED Phone Contact    ED Course                               MDM  Number of Diagnoses or Management Options  Allergic reaction, initial encounter: new and requires workup  Diagnosis management comments: 59-year-old male presents for evaluation of allergic reaction after eating some homemade cookies  Patient is unsure if the cookies contained knots    No history of similar reaction in the past   Hives present inferior portion of the buttocks  Swelling of the lower lip  No oropharyngeal edema or tongue swelling  No Ace inhibitors  Patient given Benadryl, prednisone and Pepcid with resolution of pruritic rash  No significant change in lower lip swelling; however, no progression  PCP follow-up  Discussed return precautions with patient  Patient Progress  Patient progress: stable    CritCare Time    Disposition  Final diagnoses: Allergic reaction, initial encounter     Time reflects when diagnosis was documented in both MDM as applicable and the Disposition within this note     Time User Action Codes Description Comment    12/17/2018 11:34 PM Ethan Bustos Add [T78 [id number]] Allergic reaction, initial encounter       ED Disposition     ED Disposition Condition Comment    Discharge  Pablo Kellee discharge to home/self care      Condition at discharge: Stable        Follow-up Information     Follow up With Specialties Details Why Contact Info Additional Information    Deisi To MD Family Medicine Schedule an appointment as soon as possible for a visit in 1 week for re-evalaution 611 [facility]  1000 [facility]  [id number] [place] [address] Drive 46 [facility] Emergency Medicine Go to If symptoms worsen [address] [place] [address]  [address] [place] [address] [address] ED, [facility] 96, 301 [facility], [address] [place] [address], [id number]          Discharge Medication List as of 12/17/2018 11:37 PM      START taking these medications    Details   diphenhydrAMINE (BENADRYL) 25 mg tablet Take 2 tablets (50 mg total) by mouth every 8 (eight) hours as needed for itching, Starting Mon 12/17/2018, Print      EPINEPHrine (EPIPEN) 0 3 mg/0 3 mL SOAJ Inject 0 3 mL (0 3 mg total) into a muscle once for 1 dose, Starting Mon 12/17/2018, Print      famotidine (PEPCID) 20 mg tablet Take 1 tablet (20 mg total) by mouth 2 (two) times a day for 2 days, Starting Mon 12/17/2018, Until Wed 12/19/2018, Print      predniSONE 20 mg tablet Take 1 tablet (20 mg total) by mouth daily for 4 days, Starting Mon 12/17/2018, Until Fri 12/21/2018, Print         CONTINUE these medications which have NOT CHANGED    Details   Cranberry 500 MG CAPS Take 500 mg by mouth daily, Historical Med      aspirin 81 mg chewable tablet Chew 81 mg daily, Historical Med      atorvastatin (LIPITOR) 80 mg tablet Take 80 mg by mouth daily, Historical Med      co-enzyme Q-10 30 MG capsule Take 100 mg by mouth daily, Historical Med      ezetimibe (ZETIA) 10 mg tablet Take 10 mg by mouth daily, Historical Med      levothyroxine 25 mcg tablet Take 25 mcg by mouth daily, Historical Med      metoprolol tartrate (LOPRESSOR) 25 mg tablet Take 25 mg by mouth every 12 (twelve) hours, Historical Med      Multiple Vitamin (MULTIVITAMIN) capsule Take 1 capsule by mouth daily, Historical Med      Omega-3 Fatty Acids (FISH OIL) 1,000 mg Take 1,000 mg by mouth daily, Historical Med           No discharge procedures on file      ED Provider  Electronically Signed by           Earline Rgigs MD  12/18/18 9209

## 2018-12-18 NOTE — DISCHARGE INSTRUCTIONS
General Allergic Reaction   WHAT YOU NEED TO KNOW:   An allergic reaction is your body's response to an allergen  Allergens include medicines, food, insect stings, animal dander, mold, latex, chemicals, and dust mites  Pollen from trees, grass, and weeds can also cause an allergic reaction  DISCHARGE INSTRUCTIONS:   Return to the emergency department if:   · You have a skin rash, hives, swelling, or itching that gets worse  · You have trouble breathing, shortness of breath, wheezing, or coughing  · Your throat tightens, or your lips or tongue swell  · You have trouble swallowing or speaking  · You have dizziness, lightheadedness, fainting, or confusion  · You have nausea, vomiting, diarrhea, or abdominal cramps  · You have chest pain or tightness  Contact your healthcare provider if:   · You have questions or concerns about your condition or care  Medicines:   · Medicines  may be given to relieve certain allergy symptoms such as itching, sneezing, and swelling  You may take them as a pill or use drops in your nose or eyes  Topical treatments may be given to put directly on your skin to help decrease itching or swelling  · Take your medicine as directed  Contact your healthcare provider if you think your medicine is not helping or if you have side effects  Tell him of her if you are allergic to any medicine  Keep a list of the medicines, vitamins, and herbs you take  Include the amounts, and when and why you take them  Bring the list or the pill bottles to follow-up visits  Carry your medicine list with you in case of an emergency  Follow up with your healthcare provider as directed:  Write down your questions so you remember to ask them during your visits  Self-care:   · Avoid the allergen  that you think may have caused your allergic reaction  · Use cold compresses  on your skin or eyes if they were affected by the allergic reaction   Cold compresses may help to soothe your skin or eyes  · Rinse your nasal passages  with a saline solution  Daily rinsing may help clear your nose of allergens  · Do not smoke  Your allergy symptoms may decrease if you are not around smoke  Nicotine and other chemicals in cigarettes and cigars can also cause lung damage  Ask your healthcare provider for information if you currently smoke and need help to quit  E-cigarettes or smokeless tobacco still contain nicotine  Talk to your healthcare provider before you use these products  © 2017 [facility] Information is for End User's use only and may not be sold, redistributed or otherwise used for commercial purposes  All illustrations and images included in CareNotes® are the copyrighted property of A D A M [employer] Inc  or Jose Luis Chávez  The above information is an  only  It is not intended as medical advice for individual conditions or treatments  Talk to your doctor, nurse or pharmacist before following any medical regimen to see if it is safe and effective for you

## 2019-08-01 LAB — HBA1C MFR BLD HPLC: 7.2 %

## 2019-08-12 ENCOUNTER — HOSPITAL ENCOUNTER (OUTPATIENT)
Facility: HOSPITAL | Age: 74
Setting detail: OBSERVATION
Discharge: HOME | End: 2019-08-13
Attending: EMERGENCY MEDICINE | Admitting: INTERNAL MEDICINE
Payer: MEDICARE

## 2019-08-12 ENCOUNTER — APPOINTMENT (EMERGENCY)
Dept: RADIOLOGY | Facility: HOSPITAL | Age: 74
End: 2019-08-12
Payer: MEDICARE

## 2019-08-12 DIAGNOSIS — R42 DIZZINESS: Primary | ICD-10-CM

## 2019-08-12 DIAGNOSIS — R07.9 CHEST PAIN: ICD-10-CM

## 2019-08-12 PROBLEM — E07.9 DISEASE OF THYROID GLAND: Status: ACTIVE | Noted: 2019-08-12

## 2019-08-12 PROBLEM — M79.602 LEFT ARM PAIN: Status: ACTIVE | Noted: 2019-08-12

## 2019-08-12 PROBLEM — Z95.1 HX OF CABG: Status: ACTIVE | Noted: 2019-08-12

## 2019-08-12 PROBLEM — I10 HYPERTENSION: Status: ACTIVE | Noted: 2019-08-12

## 2019-08-12 LAB
ANION GAP SERPL CALCULATED.3IONS-SCNC: 10 MMOL/L (ref 4–13)
BASOPHILS # BLD AUTO: 0.04 THOUSANDS/ΜL (ref 0–0.1)
BASOPHILS NFR BLD AUTO: 1 % (ref 0–1)
BUN SERPL-MCNC: 12 MG/DL (ref 5–25)
CALCIUM SERPL-MCNC: 9.9 MG/DL (ref 8.3–10.1)
CHLORIDE SERPL-SCNC: 104 MMOL/L (ref 100–108)
CO2 SERPL-SCNC: 28 MMOL/L (ref 21–32)
CREAT SERPL-MCNC: 0.94 MG/DL (ref 0.6–1.3)
EOSINOPHIL # BLD AUTO: 0.35 THOUSAND/ΜL (ref 0–0.61)
EOSINOPHIL NFR BLD AUTO: 6 % (ref 0–6)
ERYTHROCYTE [DISTWIDTH] IN BLOOD BY AUTOMATED COUNT: 12.7 % (ref 11.6–15.1)
GFR SERPL CREATININE-BSD FRML MDRD: 79 ML/MIN/1.73SQ M
GLUCOSE SERPL-MCNC: 101 MG/DL (ref 65–140)
HCT VFR BLD AUTO: 41.6 % (ref 36.5–49.3)
HGB BLD-MCNC: 14.4 G/DL (ref 12–17)
IMM GRANULOCYTES # BLD AUTO: 0.01 THOUSAND/UL (ref 0–0.2)
IMM GRANULOCYTES NFR BLD AUTO: 0 % (ref 0–2)
LYMPHOCYTES # BLD AUTO: 1.95 THOUSANDS/ΜL (ref 0.6–4.47)
LYMPHOCYTES NFR BLD AUTO: 34 % (ref 14–44)
MCH RBC QN AUTO: 32.7 PG (ref 26.8–34.3)
MCHC RBC AUTO-ENTMCNC: 34.6 G/DL (ref 31.4–37.4)
MCV RBC AUTO: 94 FL (ref 82–98)
MONOCYTES # BLD AUTO: 0.49 THOUSAND/ΜL (ref 0.17–1.22)
MONOCYTES NFR BLD AUTO: 9 % (ref 4–12)
NEUTROPHILS # BLD AUTO: 2.85 THOUSANDS/ΜL (ref 1.85–7.62)
NEUTS SEG NFR BLD AUTO: 50 % (ref 43–75)
NRBC BLD AUTO-RTO: 0 /100 WBCS
PLATELET # BLD AUTO: 219 THOUSANDS/UL (ref 149–390)
PMV BLD AUTO: 9.5 FL (ref 8.9–12.7)
POTASSIUM SERPL-SCNC: 3.9 MMOL/L (ref 3.5–5.3)
RBC # BLD AUTO: 4.41 MILLION/UL (ref 3.88–5.62)
SODIUM SERPL-SCNC: 142 MMOL/L (ref 136–145)
TROPONIN I SERPL-MCNC: <0.02 NG/ML
TROPONIN I SERPL-MCNC: <0.02 NG/ML
WBC # BLD AUTO: 5.69 THOUSAND/UL (ref 4.31–10.16)

## 2019-08-12 PROCEDURE — 99284 EMERGENCY DEPT VISIT MOD MDM: CPT | Performed by: EMERGENCY MEDICINE

## 2019-08-12 PROCEDURE — 36415 COLL VENOUS BLD VENIPUNCTURE: CPT | Performed by: EMERGENCY MEDICINE

## 2019-08-12 PROCEDURE — 84484 ASSAY OF TROPONIN QUANT: CPT | Performed by: EMERGENCY MEDICINE

## 2019-08-12 PROCEDURE — 84484 ASSAY OF TROPONIN QUANT: CPT | Performed by: PHYSICIAN ASSISTANT

## 2019-08-12 PROCEDURE — 99217 PR OBSERVATION CARE DISCHARGE MANAGEMENT: CPT | Performed by: PHYSICIAN ASSISTANT

## 2019-08-12 PROCEDURE — 99285 EMERGENCY DEPT VISIT HI MDM: CPT

## 2019-08-12 PROCEDURE — 85025 COMPLETE CBC W/AUTO DIFF WBC: CPT | Performed by: EMERGENCY MEDICINE

## 2019-08-12 PROCEDURE — 1123F ACP DISCUSS/DSCN MKR DOCD: CPT | Performed by: PHYSICIAN ASSISTANT

## 2019-08-12 PROCEDURE — 71046 X-RAY EXAM CHEST 2 VIEWS: CPT

## 2019-08-12 PROCEDURE — 80048 BASIC METABOLIC PNL TOTAL CA: CPT | Performed by: EMERGENCY MEDICINE

## 2019-08-12 PROCEDURE — 93005 ELECTROCARDIOGRAM TRACING: CPT

## 2019-08-12 RX ORDER — HEPARIN SODIUM 5000 [USP'U]/ML
5000 INJECTION, SOLUTION INTRAVENOUS; SUBCUTANEOUS EVERY 8 HOURS SCHEDULED
Status: DISCONTINUED | OUTPATIENT
Start: 2019-08-12 | End: 2019-08-13 | Stop reason: HOSPADM

## 2019-08-12 RX ORDER — EZETIMIBE 10 MG/1
10 TABLET ORAL DAILY
Status: DISCONTINUED | OUTPATIENT
Start: 2019-08-12 | End: 2019-08-13 | Stop reason: HOSPADM

## 2019-08-12 RX ORDER — 0.9 % SODIUM CHLORIDE 0.9 %
3 VIAL (ML) INJECTION AS NEEDED
Status: DISCONTINUED | OUTPATIENT
Start: 2019-08-12 | End: 2019-08-13 | Stop reason: HOSPADM

## 2019-08-12 RX ORDER — CHOLECALCIFEROL (VITAMIN D3) 125 MCG
100 CAPSULE ORAL DAILY
Status: DISCONTINUED | OUTPATIENT
Start: 2019-08-13 | End: 2019-08-13 | Stop reason: HOSPADM

## 2019-08-12 RX ORDER — PYRIDOXINE HCL (VITAMIN B6) 100 MG
500 TABLET ORAL DAILY
Status: DISCONTINUED | OUTPATIENT
Start: 2019-08-13 | End: 2019-08-12 | Stop reason: RX

## 2019-08-12 RX ORDER — DIPHENHYDRAMINE HCL 25 MG
50 TABLET ORAL EVERY 8 HOURS PRN
Status: DISCONTINUED | OUTPATIENT
Start: 2019-08-12 | End: 2019-08-13 | Stop reason: HOSPADM

## 2019-08-12 RX ORDER — LEVOTHYROXINE SODIUM 0.03 MG/1
25 TABLET ORAL DAILY
Status: DISCONTINUED | OUTPATIENT
Start: 2019-08-13 | End: 2019-08-13 | Stop reason: HOSPADM

## 2019-08-12 RX ORDER — ATORVASTATIN CALCIUM 40 MG/1
80 TABLET, FILM COATED ORAL DAILY
Status: DISCONTINUED | OUTPATIENT
Start: 2019-08-12 | End: 2019-08-13 | Stop reason: HOSPADM

## 2019-08-12 RX ORDER — ASPIRIN 81 MG/1
81 TABLET, CHEWABLE ORAL DAILY
Status: DISCONTINUED | OUTPATIENT
Start: 2019-08-13 | End: 2019-08-13 | Stop reason: HOSPADM

## 2019-08-12 RX ADMIN — ATORVASTATIN CALCIUM 80 MG: 40 TABLET, FILM COATED ORAL at 22:06

## 2019-08-12 RX ADMIN — EZETIMIBE 10 MG: 10 TABLET ORAL at 22:06

## 2019-08-12 RX ADMIN — HEPARIN SODIUM 5000 UNITS: 5000 INJECTION INTRAVENOUS; SUBCUTANEOUS at 22:06

## 2019-08-12 NOTE — ED CARE HANDOFF
Emergency Department Sign Out Note        Sign out and transfer of care from Dr Soto Esqueda  See Separate Emergency Department note  The patient, Gabriela Lozano, was evaluated by the previous provider for chest pain  Workup Completed:  X-ray chest 2 views   ED Interpretation   No acute disease         Labs Reviewed   TROPONIN I - Normal       Result Value Ref Range Status    Troponin I <0 02  <=0 04 ng/mL Final    Comment: 3Autovalidation override  [employer] Chemistry analyzer 99% cutoff is > 0 04 ng/mL in network labs     o cTnI 99% cutoff is useful only when applied to patients in the clinical setting of myocardial ischemia   o cTnI 99% cutoff should be interpreted in the context of clinical history, ECG findings and possibly cardiac imaging to establish correct diagnosis  o cTnI 99% cutoff may be suggestive but clearly not indicative of a coronary event without the clinical setting of myocardial ischemia       CBC AND DIFFERENTIAL    WBC 5 69  4 31 - 10 16 Thousand/uL Final    RBC 4 41  3 88 - 5 62 Million/uL Final    Hemoglobin 14 4  12 0 - 17 0 g/dL Final    Hematocrit 41 6  36 5 - 49 3 % Final    MCV 94  82 - 98 fL Final    MCH 32 7  26 8 - 34 3 pg Final    MCHC 34 6  31 4 - 37 4 g/dL Final    RDW 12 7  11 6 - 15 1 % Final    MPV 9 5  8 9 - 12 7 fL Final    Platelets 975  724 - 390 Thousands/uL Final    nRBC 0  /100 WBCs Final    Neutrophils Relative 50  43 - 75 % Final    Immat GRANS % 0  0 - 2 % Final    Lymphocytes Relative 34  14 - 44 % Final    Monocytes Relative 9  4 - 12 % Final    Eosinophils Relative 6  0 - 6 % Final    Basophils Relative 1  0 - 1 % Final    Neutrophils Absolute 2 85  1 85 - 7 62 Thousands/µL Final    Immature Grans Absolute 0 01  0 00 - 0 20 Thousand/uL Final    Lymphocytes Absolute 1 95  0 60 - 4 47 Thousands/µL Final    Monocytes Absolute 0 49  0 17 - 1 22 Thousand/µL Final    Eosinophils Absolute 0 35  0 00 - 0 61 Thousand/µL Final    Basophils Absolute 0 04  0 00 - 0 10 Thousands/µL Final   BASIC METABOLIC PANEL    Sodium 047  136 - 145 mmol/L Final    Potassium 3 9  3 5 - 5 3 mmol/L Final    Chloride 104  100 - 108 mmol/L Final    CO2 28  21 - 32 mmol/L Final    ANION GAP 10  4 - 13 mmol/L Final    BUN 12  5 - 25 mg/dL Final    Creatinine 0 94  0 60 - 1 30 mg/dL Final    Comment: Standardized to IDMS reference method    Glucose 101  65 - 140 mg/dL Final    Comment:   If the patient is fasting, the ADA then defines impaired fasting glucose as > 100 mg/dL and diabetes as > or equal to 123 mg/dL  Specimen collection should occur prior to Sulfasalazine administration due to the potential for falsely depressed results  Specimen collection should occur prior to Sulfapyridine administration due to the potential for falsely elevated results  Calcium 9 9  8 3 - 10 1 mg/dL Final    eGFR 79  ml/min/1 73sq m Final    Narrative:     [place] guidelines for Chronic Kidney Disease (CKD):     Stage 1 with normal or high GFR (GFR > 90 mL/min/1 73 square meters)    Stage 2 Mild CKD (GFR = 60-89 mL/min/1 73 square meters)    Stage 3A Moderate CKD (GFR = 45-59 mL/min/1 73 square meters)    Stage 3B Moderate CKD (GFR = 30-44 mL/min/1 73 square meters)    Stage 4 Severe CKD (GFR = 15-29 mL/min/1 73 square meters)    Stage 5 End Stage CKD (GFR <15 mL/min/1 73 square meters)  Note: GFR calculation is accurate only with a steady state creatinine        ED Course / Workup Pending (followup):                ED Course as of Aug 12 1949   [facility] Aug 12, 2019   1920 Pt signed out from Dr Reina Wills  Pt with PMH of CAD presents with intermittent CP and dizziness today  Trop pend  Dr Reina Wills recommends CP observation due to high heart score once trop resulted  EKG reported to be normal by Dr Reina Wills  1938 Trop neg  HEART score 5  Pt feels improved  Given risk factors, will keep for observation  Called Car[facility] to discuss   She is quite busy, but will be down to see the patient asap          Procedures  MDM  Number of Diagnoses or Management Options  Chest pain: new and requires workup  Dizziness: new and requires workup     Amount and/or Complexity of Data Reviewed  Clinical lab tests: reviewed  Tests in the radiology section of CPT®: reviewed  Tests in the medicine section of CPT®: reviewed  Discussion of test results with the performing providers: yes  Review and summarize past medical records: yes  Discuss the patient with other providers: yes  Independent visualization of images, tracings, or specimens: yes    Risk of Complications, Morbidity, and/or Mortality  Presenting problems: moderate  Diagnostic procedures: low  Management options: low    Patient Progress  Patient progress: improved      Disposition  Final diagnoses:   Dizziness   Chest pain     Time reflects when diagnosis was documented in both MDM as applicable and the Disposition within this note     Time User Action Codes Description Comment    8/12/2019  7:49 PM Steffany OSORIO Add [R42] Dizziness     8/12/2019  7:49 PM Tung Cadet Add [R07 9] Chest pain       ED Disposition     ED Disposition Condition Date/Time Comment    Admit Stable Mon Aug 12, 2019  7:39 PM Case was discussed with Lisa Ruiz and the patient's admission status was agreed to be Admission Status: observation status to the service of Dr Sabrina Millard   Follow-up Information    None       Patient's Medications   Discharge Prescriptions    No medications on file     No discharge procedures on file         ED Provider  Electronically Signed by     Jorge Marin MD  08/12/19 3824

## 2019-08-12 NOTE — ED PROVIDER NOTES
History  Chief Complaint   Patient presents with    Dizziness     pt presents to ER stating about 2 hours ago he became lightheaded, noticed some soreness in his chest, along with a pain in his left arm      This 65-year-old man with history of coronary artery disease, triple bypass in 2009 and a 3 coronary stents not 2005 states he felt weak and lightheaded while [place] this [place]  This back began around 10:00 a m  He had a stop work go home in relax  He still feels somewhat lightheaded  Patient also admits to having dull right sided anterolateral chest discomfort  This began around 3:00 p m  Has constant but improved  He believes it is possibly slightly worse with deep breath  Patient denies associated diaphoresis, nausea, palpitations and syncope  States he has not felt this way before  Patient denies recent surgery, immobility, long distance travel, hemoptysis and pain or swelling of any extremity          Prior to Admission Medications   Prescriptions Last Dose Informant Patient Reported? Taking?    Cranberry 500 MG CAPS   Yes No   Sig: Take 500 mg by mouth daily   EPINEPHrine (EPIPEN) 0 3 mg/0 3 mL SOAJ   No No   Sig: Inject 0 3 mL (0 3 mg total) into a muscle once for 1 dose   Multiple Vitamin (MULTIVITAMIN) capsule   Yes No   Sig: Take 1 capsule by mouth daily   Omega-3 Fatty Acids (FISH OIL) 1,000 mg   Yes No   Sig: Take 1,000 mg by mouth daily   aspirin 81 mg chewable tablet   Yes No   Sig: Chew 81 mg daily   atorvastatin (LIPITOR) 80 mg tablet   Yes No   Sig: Take 80 mg by mouth daily   co-enzyme Q-10 30 MG capsule   Yes No   Sig: Take 100 mg by mouth daily   diphenhydrAMINE (BENADRYL) 25 mg tablet Not Taking at Unknown time  No No   Sig: Take 2 tablets (50 mg total) by mouth every 8 (eight) hours as needed for itching   Patient not taking: Reported on 8/12/2019   ezetimibe (ZETIA) 10 mg tablet   Yes No   Sig: Take 10 mg by mouth daily   levothyroxine 25 mcg tablet   Yes No   Sig: Take 25 mcg by mouth daily   metoprolol tartrate (LOPRESSOR) 25 mg tablet   Yes No   Sig: Take 25 mg by mouth every 12 (twelve) hours      Facility-Administered Medications: None       Past Medical History:   Diagnosis Date    Cardiac disease     Disease of thyroid gland     Hematuria     Hyperlipidemia     Hypertension        Past Surgical History:   Procedure Laterality Date    CARDIAC SURGERY      CORONARY ANGIOPLASTY WITH STENT PLACEMENT      HERNIA REPAIR         History reviewed  No pertinent family history  I have reviewed and agree with the history as documented  Social History     Tobacco Use    Smoking status: Former Smoker    Smokeless tobacco: Never Used   Substance Use Topics    Alcohol use: No    Drug use: No        Review of Systems   Constitutional: Negative  HENT: Negative  Eyes: Negative  Respiratory: Negative  Cardiovascular: Negative  Gastrointestinal: Negative  Endocrine: Negative  Genitourinary: Negative  Musculoskeletal: Negative  Skin: Negative  Allergic/Immunologic: Negative  Neurological: Negative  Hematological: Negative  Psychiatric/Behavioral: The patient is nervous/anxious  All other systems reviewed and are negative  Physical Exam  Physical Exam   Constitutional: He is oriented to person, place, and time  He appears well-developed and well-nourished  HENT:   Head: Normocephalic and atraumatic  Right Ear: External ear normal    Left Ear: External ear normal    Mouth/Throat: Oropharynx is clear and moist    Eyes: Pupils are equal, round, and reactive to light  Conjunctivae and EOM are normal    Neck: Normal range of motion  Neck supple  No JVD present  Cardiovascular: Normal rate, regular rhythm, normal heart sounds and intact distal pulses  No murmur heard  Pulmonary/Chest: Effort normal and breath sounds normal  He exhibits no tenderness  Abdominal: Soft   Bowel sounds are normal  He exhibits no distension and no mass  There is no tenderness  There is no rebound and no guarding  Musculoskeletal: Normal range of motion  He exhibits no edema or tenderness  Lymphadenopathy:     He has no cervical adenopathy  Neurological: He is alert and oriented to person, place, and time  He has normal reflexes  No cranial nerve deficit  Coordination normal    Skin: Skin is warm and dry  Capillary refill takes less than 2 seconds  No rash noted  Psychiatric: He has a normal mood and affect  His behavior is normal    Nursing note and vitals reviewed  Vital Signs  ED Triage Vitals [08/12/19 1810]   Temp Pulse Respirations Blood Pressure SpO2   -- 57 19 169/75 96 %      Temp src Heart Rate Source Patient Position - Orthostatic VS BP Location FiO2 (%)   -- Monitor Lying Right arm --      Pain Score       1           Vitals:    08/12/19 1810   BP: 169/75   Pulse: 57   Patient Position - Orthostatic VS: Lying         Visual Acuity      ED Medications  Medications - No data to display    Diagnostic Studies  Results Reviewed     None                 No orders to display              Procedures  ECG 12 Lead Documentation Only  Date/Time: 8/12/2019 6:10 PM  Performed by: Jose Escalante DO  Authorized by: Jose Escalante DO     ECG reviewed by me, the ED Provider: yes    Patient location:  ED  Previous ECG:     Previous ECG:  Compared to current    Comparison ECG info:  Compared to EKG August 10 2014 PVCs no longer present  T-wave amplitude increased in the anterior leads  QT segment slightly shortened      Similarity:  Changes noted  Rhythm:     Rhythm: sinus bradycardia    Ectopy:     Ectopy: none    QRS:     QRS axis:  Right    QRS intervals:  Normal  Conduction:     Conduction: normal    ST segments:     ST segments:  Normal  T waves:     T waves: normal    Q waves:     Q waves:  V2           ED Course                               MDM  Number of Diagnoses or Management Options  Diagnosis management comments: Moderate suspicion for unstable angina although improved currently  Patient has heart score of 5 even if his troponin is within normal range  Initial EKG shows no obvious ischemic changes  Waiting for troponin other lab work as well as chest x-ray  Case signed out to Dr Mick Christina  Amount and/or Complexity of Data Reviewed  Clinical lab tests: ordered  Tests in the radiology section of CPT®: ordered  Discuss the patient with other providers: yes  Independent visualization of images, tracings, or specimens: yes        Disposition  Final diagnoses:   None     ED Disposition     None      Follow-up Information    None         Patient's Medications   Discharge Prescriptions    No medications on file     No discharge procedures on file      ED Provider  Electronically Signed by           Lacy Acosta DO  08/12/19 1145

## 2019-08-13 VITALS
DIASTOLIC BLOOD PRESSURE: 64 MMHG | HEART RATE: 53 BPM | WEIGHT: 147.93 LBS | HEIGHT: 68 IN | RESPIRATION RATE: 18 BRPM | TEMPERATURE: 97.9 F | BODY MASS INDEX: 22.42 KG/M2 | SYSTOLIC BLOOD PRESSURE: 120 MMHG | OXYGEN SATURATION: 96 %

## 2019-08-13 PROBLEM — R07.89 CHEST PAIN, MUSCULOSKELETAL: Status: ACTIVE | Noted: 2019-08-13

## 2019-08-13 LAB
ANION GAP SERPL CALCULATED.3IONS-SCNC: 11 MMOL/L (ref 4–13)
ATRIAL RATE: 49 BPM
ATRIAL RATE: 58 BPM
BUN SERPL-MCNC: 10 MG/DL (ref 5–25)
CALCIUM SERPL-MCNC: 9 MG/DL (ref 8.3–10.1)
CHLORIDE SERPL-SCNC: 105 MMOL/L (ref 100–108)
CHOLEST SERPL-MCNC: 152 MG/DL (ref 50–200)
CO2 SERPL-SCNC: 26 MMOL/L (ref 21–32)
CREAT SERPL-MCNC: 0.81 MG/DL (ref 0.6–1.3)
ERYTHROCYTE [DISTWIDTH] IN BLOOD BY AUTOMATED COUNT: 12.7 % (ref 11.6–15.1)
GFR SERPL CREATININE-BSD FRML MDRD: 87 ML/MIN/1.73SQ M
GLUCOSE SERPL-MCNC: 89 MG/DL (ref 65–140)
HCT VFR BLD AUTO: 40.5 % (ref 36.5–49.3)
HDLC SERPL-MCNC: 51 MG/DL (ref 40–60)
HGB BLD-MCNC: 13.8 G/DL (ref 12–17)
LDLC SERPL CALC-MCNC: 88 MG/DL (ref 0–100)
MAGNESIUM SERPL-MCNC: 1.9 MG/DL (ref 1.6–2.6)
MCH RBC QN AUTO: 32.2 PG (ref 26.8–34.3)
MCHC RBC AUTO-ENTMCNC: 34.1 G/DL (ref 31.4–37.4)
MCV RBC AUTO: 94 FL (ref 82–98)
P AXIS: 42 DEGREES
P AXIS: 54 DEGREES
PHOSPHATE SERPL-MCNC: 3.8 MG/DL (ref 2.3–4.1)
PLATELET # BLD AUTO: 204 THOUSANDS/UL (ref 149–390)
PMV BLD AUTO: 9.6 FL (ref 8.9–12.7)
POTASSIUM SERPL-SCNC: 3.5 MMOL/L (ref 3.5–5.3)
PR INTERVAL: 160 MS
PR INTERVAL: 174 MS
QRS AXIS: 102 DEGREES
QRS AXIS: 97 DEGREES
QRSD INTERVAL: 76 MS
QRSD INTERVAL: 82 MS
QT INTERVAL: 426 MS
QT INTERVAL: 496 MS
QTC INTERVAL: 418 MS
QTC INTERVAL: 448 MS
RBC # BLD AUTO: 4.29 MILLION/UL (ref 3.88–5.62)
SODIUM SERPL-SCNC: 142 MMOL/L (ref 136–145)
T WAVE AXIS: 45 DEGREES
T WAVE AXIS: 48 DEGREES
TRIGL SERPL-MCNC: 63 MG/DL
TROPONIN I SERPL-MCNC: <0.02 NG/ML
TROPONIN I SERPL-MCNC: <0.02 NG/ML
VENTRICULAR RATE: 49 BPM
VENTRICULAR RATE: 58 BPM
WBC # BLD AUTO: 5.87 THOUSAND/UL (ref 4.31–10.16)

## 2019-08-13 PROCEDURE — 83735 ASSAY OF MAGNESIUM: CPT | Performed by: PHYSICIAN ASSISTANT

## 2019-08-13 PROCEDURE — 80061 LIPID PANEL: CPT | Performed by: PHYSICIAN ASSISTANT

## 2019-08-13 PROCEDURE — 93010 ELECTROCARDIOGRAM REPORT: CPT | Performed by: INTERNAL MEDICINE

## 2019-08-13 PROCEDURE — 99217 PR OBSERVATION CARE DISCHARGE MANAGEMENT: CPT | Performed by: INTERNAL MEDICINE

## 2019-08-13 PROCEDURE — 84484 ASSAY OF TROPONIN QUANT: CPT | Performed by: PHYSICIAN ASSISTANT

## 2019-08-13 PROCEDURE — 85027 COMPLETE CBC AUTOMATED: CPT | Performed by: PHYSICIAN ASSISTANT

## 2019-08-13 PROCEDURE — 84100 ASSAY OF PHOSPHORUS: CPT | Performed by: PHYSICIAN ASSISTANT

## 2019-08-13 PROCEDURE — 80048 BASIC METABOLIC PNL TOTAL CA: CPT | Performed by: PHYSICIAN ASSISTANT

## 2019-08-13 PROCEDURE — 93005 ELECTROCARDIOGRAM TRACING: CPT

## 2019-08-13 RX ADMIN — LEVOTHYROXINE SODIUM 25 MCG: 25 TABLET ORAL at 06:09

## 2019-08-13 RX ADMIN — Medication 100 MG: at 09:15

## 2019-08-13 RX ADMIN — ASPIRIN 81 MG 81 MG: 81 TABLET ORAL at 09:14

## 2019-08-13 RX ADMIN — HEPARIN SODIUM 5000 UNITS: 5000 INJECTION INTRAVENOUS; SUBCUTANEOUS at 06:09

## 2019-08-13 NOTE — DISCHARGE INSTR - AVS FIRST PAGE
Dear Nishant Owen,     It was our pleasure to care for you here at [facility]   It is our hope that we were always able to meet and exceed the expected standards for your care during your stay  You were hospitalized due to chest pain and lightheadedness   Your chest pain was reproducible on chest wall palpation  On presentation, your cardiac enzymes troponin were within normal limits and EKG showed no acute changes  You were cared for on the general medical floor under the service of Domonique Sullivan MD with the [facility] who covers for your primary care physician (PCP), Michelle Wilks MD, while you were hospitalized  If you have any questions or concerns related to this hospitalization, you may contact us at [phone]  For follow up, we recommend that you follow up with your primary care physician  Please review this entire discharge summary as additional general instructions may be provided later as well  However, at this time we provide for you here, the most important instructions / recommendations at discharge:       · Follow up with the primary care physician within 1 week of discharge  · For your chest wall pain, you can take Tylenol as needed    Please avoid NSAIDs given your history of heart disease      Sincerely,     Domonique Sullivan MD

## 2019-08-13 NOTE — H&P
H&P- Bill Terrell 1945, 75 y o  male MRN: [id number]    Unit/Bed#: [address] [place] [address] Encounter: [id number]    Primary Care Provider: Milana Rodriguez MD   Date and time admitted to hospital: 8/12/2019  6:04 PM        * [address] [place] [address] Place  - patient has a history of a CABG x3 in 1992 and a stent placement in 2005 with 5 stents  - today [place] had dizziness with left-sided arm and anterior lateral chest discomfort  - troponin x3 with EKGs  - if needed will consult Cardiology for the a m   - chest pain observation HEART score 5   - patient's cardiologist is from [facility] Dr Renetta Ortega [facility] - and Dr Terrie Oneil for the stent  Patient does have a history of carotid artery stenosis and gets regular checkup had a recent one not sure when       Left arm pain  Assessment & Plan  See above    Hx of CABG  Assessment & Plan  Patient had CABG x3 in 1992    Hypertension  Assessment & Plan  Continue metoprolol    Disease of thyroid gland  Assessment & Plan  Continue levothyroxine        VTE Prophylaxis: Heparin  / sequential compression device   Code Status: level 1  POLST: POLST form is not discussed and not completed at this time  Anticipated Length of Stay:  Patient will be admitted on an Observation basis with an anticipated length of stay of  < 2 midnights  Justification for Hospital Stay: chest pain     Total Time for Visit, including Counseling / Coordination of Care: 30 minutes  Greater than 50% of this total time spent on direct patient counseling and coordination of care      Chief Complaint:   Chest pain     History of Present Illness:    Bill Terrell is a 75 y o  male who presents with to the ER with a history of CAD triple bypass in 1992 and 3 coronary stent in 2005 stated he feels lightheaded while walking in the cemetery this morning but then it resolved so he decided to go home and relax and then he started having some left-sided arm pain and right-sided chest pain he also continued to feel lightheaded he also felt like when he took a deep breath that felt worse he denies any syncope or diaphoresis nausea or palpitations and he never felt that way before he denies any travel out of the country immobility pain or swelling of any of his extremities    Review of Systems:    Review of Systems   Constitutional: Negative  Negative for appetite change, chills, fatigue and fever  HENT: Negative for drooling, ear pain, facial swelling, rhinorrhea, trouble swallowing and voice change  Eyes: Negative  Negative for pain and redness  Respiratory: Positive for chest tightness  Negative for cough, shortness of breath, wheezing and stridor  Cardiovascular: Negative  Negative for chest pain, palpitations and leg swelling  Gastrointestinal: Negative  Negative for abdominal pain, blood in stool, nausea and vomiting  Endocrine: Negative  Negative for polydipsia, polyphagia and polyuria  Genitourinary: Negative for difficulty urinating, dysuria, flank pain, frequency, hematuria and urgency  Musculoskeletal: Negative  Negative for arthralgias, joint swelling, myalgias, neck pain and neck stiffness  Skin: Negative  Negative for pallor, rash and wound  Allergic/Immunologic: Negative  Negative for environmental allergies, food allergies and immunocompromised state  Neurological: Positive for dizziness  Negative for tremors, seizures, syncope, light-headedness, numbness and headaches  Hematological: Negative  Does not bruise/bleed easily  Psychiatric/Behavioral: Negative  Negative for agitation, confusion, hallucinations, self-injury and sleep disturbance  The patient is not hyperactive          Past Medical and Surgical History:     Past Medical History:   Diagnosis Date    Cardiac disease     Disease of thyroid gland     Hematuria     Hyperlipidemia     Hypertension        Past Surgical History:   Procedure Laterality Date    CARDIAC SURGERY      CORONARY ANGIOPLASTY WITH STENT PLACEMENT      HERNIA REPAIR         Meds/Allergies:    Prior to Admission medications    Medication Sig Start Date End Date Taking? Authorizing Provider   aspirin 81 mg chewable tablet Chew 81 mg daily    Historical Provider, MD   atorvastatin (LIPITOR) 80 mg tablet Take 80 mg by mouth daily    Historical Provider, MD   co-enzyme Q-10 30 MG capsule Take 100 mg by mouth daily    Historical Provider, MD   Cranberry 500 MG CAPS Take 500 mg by mouth daily    Historical Provider, MD   diphenhydrAMINE (BENADRYL) 25 mg tablet Take 2 tablets (50 mg total) by mouth every 8 (eight) hours as needed for itching  Patient not taking: Reported on 8/12/2019 12/17/18   Pamela Lopez MD   EPINEPHrine (EPIPEN) 0 3 mg/0 3 mL SOAJ Inject 0 3 mL (0 3 mg total) into a muscle once for 1 dose 12/17/18 12/17/18  Pamela Lopez MD   ezetimibe (ZETIA) 10 mg tablet Take 10 mg by mouth daily    Historical Provider, MD   levothyroxine 25 mcg tablet Take 75 mcg by mouth daily     Historical Provider, MD   metoprolol tartrate (LOPRESSOR) 25 mg tablet Take 25 mg by mouth every 12 (twelve) hours    Historical Provider, MD   Multiple Vitamin (MULTIVITAMIN) capsule Take 1 capsule by mouth daily    Historical Provider, MD   Omega-3 Fatty Acids (FISH OIL) 1,000 mg Take 1,000 mg by mouth daily    Historical Provider, MD     I have reviewed home medications using allscripts  Allergies: No Known Allergies    Social History:     Marital Status: Single   Occupation: retired  Patient Pre-hospital Living Situation: home   Patient Pre-hospital Level of Mobility: normal   Patient Pre-hospital Diet Restrictions: none   Substance Use History:   Social History     Substance and Sexual Activity   Alcohol Use No     Social History     Tobacco Use   Smoking Status Former Smoker   Smokeless Tobacco Never Used     Social History     Substance and Sexual Activity   Drug Use No       Family History:    History reviewed   No pertinent family history  Physical Exam:     Vitals:   Blood Pressure: 117/59 (08/12/19 2321)  Pulse: (!) 53 (08/12/19 2321)  Temperature: 97 7 °F (36 5 °C) (08/12/19 2321)  Temp Source: Oral (08/12/19 2321)  Respirations: 18 (08/12/19 2321)  Height: 5' 8" (172 7 cm) (08/12/19 1810)  Weight - Scale: 67 1 kg (147 lb 14 9 oz) (08/12/19 1810)  SpO2: 97 % (08/12/19 2321)    Physical Exam   Constitutional: He is oriented to person, place, and time  He appears well-developed and well-nourished  No distress  HENT:   Head: Normocephalic and atraumatic  Eyes: Pupils are equal, round, and reactive to light  Conjunctivae and EOM are normal  No scleral icterus  Neck: Normal range of motion  Neck supple  No JVD present  No tracheal deviation present  Cardiovascular: Normal rate, regular rhythm, normal heart sounds and intact distal pulses  Exam reveals no friction rub  No murmur heard  Pulmonary/Chest: Effort normal and breath sounds normal  No stridor  No respiratory distress  He has no wheezes  He has no rales  Abdominal: Soft  Bowel sounds are normal    Musculoskeletal: He exhibits edema  Neurological: He is alert and oriented to person, place, and time  He has normal reflexes  Skin: Skin is warm and dry  He is not diaphoretic  Psychiatric: He has a normal mood and affect  His behavior is normal    Nursing note and vitals reviewed  (Additional Data:     Lab Results: I have personally reviewed pertinent reports  Results from last 7 days   Lab Units 08/12/19  1850   WBC Thousand/uL 5 69   HEMOGLOBIN g/dL 14 4   HEMATOCRIT % 41 6   PLATELETS Thousands/uL 219   NEUTROS PCT % 50   LYMPHS PCT % 34   MONOS PCT % 9   EOS PCT % 6     Results from last 7 days   Lab Units 08/12/19  1850   POTASSIUM mmol/L 3 9   CHLORIDE mmol/L 104   CO2 mmol/L 28   BUN mg/dL 12   CREATININE mg/dL 0 94   CALCIUM mg/dL 9 9           Imaging: I have personally reviewed pertinent reports  No results found      EKG, Pathology, and Other Studies Reviewed on Admission:   · EKG: NSR     Epic / Care Everywhere Records Reviewed: Yes     ** Please Note: This note has been constructed using a voice recognition system   **

## 2019-08-13 NOTE — UTILIZATION REVIEW
Initial Clinical Review    Admission: Date/Time/Statement: 08/12/2019 @ 1948  Orders Placed This Encounter   Procedures    Place in Observation (expected length of stay for this patient is less than two midnights)     Standing Status:   Standing     Number of Occurrences:   1     Order Specific Question:   Admitting Physician     Answer:   Demond Christian [[id number]]     Order Specific Question:   Level of Care     Answer:   Med Surg [[id number]]     ED Arrival Information     Expected Arrival Acuity Means of Arrival Escorted By Service Admission Type    - 8/12/2019 17:56 Urgent Walk-In Family Member General Medicine Urgent    Arrival Complaint    light heaqded, pain up left arm, chest pain        Chief Complaint   Patient presents with    Dizziness     pt presents to ER stating about 2 hours ago he became lightheaded, noticed some soreness in his chest, along with a pain in his left arm      Assessment/Plan: 75year old male, presented to the ED from home via car  Admitted as Observation due to chest pain, lightheaded  [place]  Dizzy:  patient has a history of a CABG x3 in 1992 and a stent placement in 2005 with 5 stents  [place] had dizziness with left-sided arm and anterior lateral chest discomfort  troponin x3 with EKGs  if needed will consult Cardiology for the a m    chest pain observation HEART score 5     patient's cardiologist is from [facility] [place] [facility] - and Dr Lopes File for the stent  Patient does have a history of carotid artery stenosis and gets regular checkup had a recent one not sure when      ED Triage Vitals   Temperature Pulse Respirations Blood Pressure SpO2   08/12/19 2045 08/12/19 1810 08/12/19 1810 08/12/19 1810 08/12/19 1810   98 2 °F (36 8 °C) 57 19 169/75 96 %      Temp Source Heart Rate Source Patient Position - Orthostatic VS BP Location FiO2 (%)   08/12/19 2045 08/12/19 1810 08/12/19 1810 08/12/19 1810 --   Tympanic Monitor Lying Right arm       Pain Score       08/12/19 1810       1        Wt Readings from Last 1 Encounters:   08/12/19 67 1 kg (147 lb 14 9 oz)     Additional Vital Signs:   Date/Time  Temp  Pulse  Resp  BP  MAP (mmHg)  SpO2  O2 Device  Patient Position - Orthostatic VS   08/13/19 0754  97 9 °F (36 6 °C)  53Abnormal   18  120/64    96 %  None (Room air)  Sitting   08/12/19 2321  97 7 °F (36 5 °C)  53Abnormal   18  117/59    97 %  None (Room air)  Lying   08/12/19 2135  97 6 °F (36 4 °C)  52Abnormal   18  153/70    97 %  None (Room air)  Sitting   08/12/19 2045  98 2 °F (36 8 °C)  61  20      98 %       08/12/19 2030    56  17  134/72  96  99 %       08/12/19 2015    52Abnormal   20      99 %  None (Room air)     08/12/19 2000    57  26Abnormal   136/65  92  98 %       08/12/19 1945    50Abnormal   17      98 %       08/12/19 1930    52Abnormal     128/68  92  98 %       08/12/19 1915    51Abnormal   17  131/68  93  98 %       08/12/19 1900    65    130/66  92  98 %  None (Room air)  Lying   08/12/19 1845    54Abnormal   19  123/72  91  96 %       08/12/19 1840              None (Room air)     08/12/19 1830    54Abnormal   20  124/63  89  96 %    Lying   08/12/19 1815    60  24Abnormal   144/65  97  97 %  None (Room air)     08/12/19 1810    57  19  169/75    96 %  None (Room air)  Lying       Pertinent Labs/Diagnostic Test Results:   Results from last 7 days   Lab Units 08/13/19  0535 08/12/19  1850   WBC Thousand/uL 5 87 5 69   HEMOGLOBIN g/dL 13 8 14 4   HEMATOCRIT % 40 5 41 6   PLATELETS Thousands/uL 204 219   NEUTROS ABS Thousands/µL  --  2 85     Results from last 7 days   Lab Units 08/13/19  0536 08/12/19  1850   SODIUM mmol/L 142 142   POTASSIUM mmol/L 3 5 3 9   CHLORIDE mmol/L 105 104   CO2 mmol/L 26 28   ANION GAP mmol/L 11 10   BUN mg/dL 10 12   CREATININE mg/dL 0 81 0 94   EGFR ml/min/1 73sq m 87 79   CALCIUM mg/dL 9 0 9 9   MAGNESIUM mg/dL 1 9  --    PHOSPHORUS mg/dL 3 8  --      Results from last 7 days   Lab Units 19  0536 19  1850   GLUCOSE RANDOM mg/dL 89 101     Results from last 7 days   Lab Units 19  0536 19  0111 19  2158 19  1850   TROPONIN I ng/mL <0 02 <0 02 <0 02 <0 02     2019 @ 0802  Chest X:  No acute cardiopulmonary disease  2019 @ 0106  EC, Sinus bradycardia  Possible Left atrial enlargement  Rightward axis  ED Treatment:   Medication Administration from 2019 1756 to 2019 2119     None        Past Medical History:   Diagnosis Date    Cardiac disease     Disease of thyroid gland     Hematuria     Hyperlipidemia     Hypertension      Present on Admission:   Dizzy   Left arm pain   Disease of thyroid gland   Hypertension      Admitting Diagnosis: Dizziness [R42]  Chest pain [R07 9]  Dizzy [R42]  Age/Sex: 75 y o  male  Admission Orders:  Current Facility-Administered Medications:  aspirin 81 mg Oral Daily   atorvastatin 80 mg Oral Daily   co-enzyme Q-10 100 mg Oral Daily   diphenhydrAMINE 50 mg Oral Q8H PRN   ezetimibe 10 mg Oral Daily   heparin (porcine) 5,000 Units Subcutaneous Q8H [address]   levothyroxine 25 mcg Oral Daily   sodium chloride (PF) 3 mL Intravenous PRN   TELM  Rogerio SCDs    2019  DC order entered - Home  Network Utilization Review Department  Phone: [phone]; Fax [phone]  [email or web address]  ATTENTION: Please call with any questions or concerns to [phone]  and carefully listen to the prompts so that you are directed to the right person  Send all requests for admission clinical reviews, approved or denied determinations and any other requests to fax [phone]   All voicemails are confidential

## 2019-08-13 NOTE — ASSESSMENT & PLAN NOTE
Chest pain, most likely musculoskeletal, history of a CABG x3 in 1992 and a stent placement in 2005 with 5 stents    Plan  Troponin negative x3, EKG no acute changes  Patient can be discharged today  To follow up with his primary care and cardiologist within 1-2 weeks of discharge

## 2019-08-13 NOTE — ASSESSMENT & PLAN NOTE
Chest pain, most likely musculoskeletal, history of a CABG x3 in 1992 and a stent placement in 2005 with 5 stents    Plan  Troponin negative x3, EKG no acute changes  Patient can be discharged today  To follow up with his primary care and cardiologist within 1-2 weeks of discharge  no

## 2019-08-13 NOTE — DISCHARGE SUMMARY
Discharge- [place] 1945, 75 y o  male MRN: [id number]    Unit/Bed#: [address] [place] [address] Encounter: [id number]    Primary Care Provider: Aicha Yap MD   Date and time admitted to hospital: 8/12/2019  6:04 PM        * Dizzy  Assessment & Plan  - Dizzy sensation resolved  - Patient can be discharged later today  Chest pain, musculoskeletal  Assessment & Plan  Chest pain, most likely musculoskeletal, history of a CABG x3 in 1992 and a stent placement in 2005 with 5 stents    Plan  Troponin negative x3, EKG no acute changes  Patient can be discharged today  To follow up with his primary care and cardiologist within 1-2 weeks of discharge  Hx of CABG  Assessment & Plan  Patient had CABG x3 in 1992    Hypertension  Assessment & Plan  Continue metoprolol    Disease of thyroid gland  Assessment & Plan  Continue levothyroxine    Left arm pain  Assessment & Plan  Now resolved          Discharging Physician / Practitioner: Julio Cesar Ha MD  PCP: Aicha Yap MD  Admission Date:   Admission Orders (From admission, onward)     Ordered        08/12/19 1948  Place in Observation (expected length of stay for this patient is less than two midnights)  Once                   Discharge Date: 08/13/19    Resolved Problems  Date Reviewed: 8/12/2019    None          Consultations During Hospital Stay:  None    Procedures Performed:   · None    Significant Findings / Test Results:   · Chest x-ray  FINDINGS:    Heart shadow appears unremarkable   Atherosclerotic vascular calcifications are noted  The lungs are clear   No pneumothorax or pleural effusion  Sternal wires are present   Visualized osseous structures appear otherwise unremarkable for the patient's age  Impression:       No acute cardiopulmonary disease  ·     Incidental Findings:   · None reported    Test Results Pending at Discharge (will require follow up):    · None     Outpatient Tests Requested:  · None    Complications: None    Reason for Admission:  Chest pain    Hospital Course:     Judson Asher is a 75 y o  male patient with past medical history of CAD status post CABG who originally presented to the hospital on 8/12/2019 due to chest pain and lightheadedness  Chest pain was noted to be reducible on chest wall palpation  On presentation EKG and troponin within normal limits  His chest pain subsequently resolved and he is stable for discharge    Please see above list of diagnoses and related plan for additional information  Condition at Discharge: good     Discharge Day Visit / Exam:     * Please refer to separate progress note for these details *      Discharge instructions/Information to patient and family:   See after visit summary for information provided to patient and family  Provisions for Follow-Up Care:  See after visit summary for information related to follow-up care and any pertinent home health orders  Disposition:     Home    For Discharges to [facility] SNF:   · Not Applicable to this Patient - Not Applicable to this Patient    Planned Readmission:  No     Discharge Statement:  I spent 20 minutes discharging the patient  This time was spent on the day of discharge  I had direct contact with the patient on the day of discharge  Greater than 50% of the total time was spent examining patient, answering all patient questions, arranging and discussing plan of care with patient as well as directly providing post-discharge instructions  Additional time then spent on discharge activities  Discharge Medications:  See after visit summary for reconciled discharge medications provided to patient and family        ** Please Note: This note has been constructed using a voice recognition system **

## 2019-08-13 NOTE — PLAN OF CARE
Problem: PAIN - ADULT  Goal: Verbalizes/displays adequate comfort level or baseline comfort level  Description  Interventions:  - Encourage patient to monitor pain and request assistance  - Assess pain using appropriate pain scale  - Administer analgesics based on type and severity of pain and evaluate response  - Implement non-pharmacological measures as appropriate and evaluate response  - Consider cultural and social influences on pain and pain management  - Notify physician/advanced practitioner if interventions unsuccessful or patient reports new pain  Outcome: Progressing     Problem: INFECTION - ADULT  Goal: Absence or prevention of progression during hospitalization  Description  INTERVENTIONS:  - Assess and monitor for signs and symptoms of infection  - Monitor lab/diagnostic results  - Monitor all insertion sites, i e  indwelling lines, tubes, and drains  - Monitor endotracheal (as able) and nasal secretions for changes in amount and color  - [place] appropriate cooling/warming therapies per order  - Administer medications as ordered  - Instruct and encourage patient and family to use good hand hygiene technique  - Identify and instruct in appropriate isolation precautions for identified infection/condition  Outcome: Progressing     Problem: SAFETY ADULT  Goal: Patient will remain free of falls  Description  INTERVENTIONS:  - Assess patient frequently for physical needs  -  Identify cognitive and physical deficits and behaviors that affect risk of falls    -  [place] fall precautions as indicated by assessment   - Educate patient/family on patient safety including physical limitations  - Instruct patient to call for assistance with activity based on assessment  - Modify environment to reduce risk of injury  - Consider OT/PT consult to assist with strengthening/mobility  Outcome: Progressing  Goal: Maintain or return to baseline ADL function  Description  INTERVENTIONS:  -  Assess patient's ability to carry out ADLs; assess patient's baseline for ADL function and identify physical deficits which impact ability to perform ADLs (bathing, care of mouth/teeth, toileting, grooming, dressing, etc )  - Assess/evaluate cause of self-care deficits   - Assess range of motion  - Assess patient's mobility; develop plan if impaired  - Assess patient's need for assistive devices and provide as appropriate  - Encourage maximum independence but intervene and supervise when necessary  ¯ Involve family in performance of ADLs  ¯ Assess for home care needs following discharge   ¯ Request OT consult to assist with ADL evaluation and planning for discharge  ¯ Provide patient education as appropriate  Outcome: Progressing  Goal: Maintain or return mobility status to optimal level  Description  INTERVENTIONS:  - Assess patient's baseline mobility status (ambulation, transfers, stairs, etc )    - Identify cognitive and physical deficits and behaviors that affect mobility  - Identify mobility aids required to assist with transfers and/or ambulation (gait belt, sit-to-stand, lift, walker, cane, etc )  - [place] fall precautions as indicated by assessment  - Record patient progress and toleration of activity level on Mobility SBAR; progress patient to next Phase/Stage  - Instruct patient to call for assistance with activity based on assessment  - Request Rehabilitation consult to assist with strengthening/weightbearing, etc   Outcome: Progressing     Problem: DISCHARGE PLANNING  Goal: Discharge to home or other facility with appropriate resources  Description  INTERVENTIONS:  - Identify barriers to discharge w/patient and caregiver  - Arrange for needed discharge resources and transportation as appropriate  - Identify discharge learning needs (meds, wound care, etc )  - Arrange for interpretive services to assist at discharge as needed  - Refer to Case Management Department for coordinating discharge planning if the patient needs post-hospital services based on physician/advanced practitioner order or complex needs related to functional status, cognitive ability, or social support system  Outcome: Progressing     Problem: Knowledge Deficit  Goal: Patient/family/caregiver demonstrates understanding of disease process, treatment plan, medications, and discharge instructions  Description  Complete learning assessment and assess knowledge base  Interventions:  - Provide teaching at level of understanding  - Provide teaching via preferred learning methods  Outcome: Progressing     Problem: DISCHARGE PLANNING - CARE MANAGEMENT  Goal: Discharge to post-acute care or home with appropriate resources  Description  INTERVENTIONS:  - Conduct assessment to determine patient/family and health care team treatment goals, and need for post-acute services based on payer coverage, community resources, and patient preferences, and barriers to discharge  - Address psychosocial, clinical, and financial barriers to discharge as identified in assessment in conjunction with the patient/family and health care team  - Arrange appropriate level of post-acute services according to patient's   needs and preference and payer coverage in collaboration with the physician and health care team  - Communicate with and update the patient/family, physician, and health care team regarding progress on the discharge plan  - Arrange appropriate transportation to post-acute venues   Outcome: Progressing     Problem: Nutrition/Hydration-ADULT  Goal: Nutrient/Hydration intake appropriate for improving, restoring or maintaining nutritional needs  Description  Monitor and assess patient's nutrition/hydration status for malnutrition (ex- brittle hair, bruises, dry skin, pale skin and conjunctiva, muscle wasting, smooth red tongue, and disorientation)  Collaborate with interdisciplinary team and initiate plan and interventions as ordered    Monitor patient's weight and dietary intake as ordered or per policy  Utilize nutrition screening tool and intervene per policy  Determine patient's food preferences and provide high-protein, high-caloric foods as appropriate       INTERVENTIONS:  - Monitor oral intake, urinary output, labs, and treatment plans  - Assess nutrition and hydration status and recommend course of action  - Evaluate amount of meals eaten  - Assist patient with eating if necessary   - Allow adequate time for meals  - Recommend/ encourage appropriate diets, oral nutritional supplements, and vitamin/mineral supplements  - Order, calculate, and assess calorie counts as needed  - Recommend, monitor, and adjust tube feedings and TPN/PPN based on assessed needs  - Assess need for intravenous fluids  - Provide specific nutrition/hydration education as appropriate  - Include patient/family/caregiver in decisions related to nutrition  Outcome: Progressing

## 2019-08-13 NOTE — PROGRESS NOTES
Progress Note - Faviola Chaves 1945, 75 y o  male MRN: [id number]    Unit/Bed#: [address] [place] [address] Encounter: [id number]    Primary Care Provider: Rajani Stratton MD   Date and time admitted to hospital: 2019  6:04 PM        * Dizzy  Assessment & Plan  - Dizzy sensation resolved  - Patient can be discharged later today  Chest pain, musculoskeletal  Assessment & Plan  Chest pain, most likely musculoskeletal, history of a CABG x3 in  and a stent placement in  with 5 stents    Plan  Troponin negative x3, EKG no acute changes  Patient can be discharged today  To follow up with his primary care and cardiologist within 1-2 weeks of discharge  Hx of CABG  Assessment & Plan  Patient had CABG x3 in     Hypertension  Assessment & Plan  Continue metoprolol    Disease of thyroid gland  Assessment & Plan  Continue levothyroxine    Left arm pain  Assessment & Plan  Now resolved      VTE Pharmacologic Prophylaxis:   Pharmacologic: Heparin  Mechanical VTE Prophylaxis in Place: Yes    Patient Centered Rounds: I have performed bedside rounds with nursing staff today  Discussions with Specialists or Other Care Team Provider:  None consulted    Education and Discussions with Family / Patient:  Patient updated on plan of care    Time Spent for Care: 20 minutes  More than 50% of total time spent on counseling and coordination of care as described above  Current Length of Stay: 0 day(s)    Current Patient Status: Observation   Certification Statement:  Patient can be discharged today    Discharge Plan: Today    Code Status: Level 1 - Full Code      Subjective:   No overnight events  Patient feels much better  Chest pain reproducible on palpation of chest wall    Denies dyspnea, orthopnea, palpitations, or leg swelling      Objective:     Vitals:   Temp (24hrs), Av 9 °F (36 6 °C), Min:97 6 °F (36 4 °C), Max:98 2 °F (36 8 °C)    Temp:  [97 6 °F (36 4 °C)-98 2 °F (36 8 °C)] 97 9 °F (36 6 °C)  HR:  [50-65] 53  Resp:  [17-26] 18  BP: (117-169)/(59-75) 120/64  SpO2:  [96 %-99 %] 96 %  Body mass index is 22 49 kg/m²  Input and Output Summary (last 24 hours): Intake/Output Summary (Last 24 hours) at 8/13/2019 1242  Last data filed at 8/13/2019 0830  Gross per 24 hour   Intake 480 ml   Output    Net 480 ml       Physical Exam:     Physical Exam   Constitutional: He is oriented to person, place, and time  He appears well-developed and well-nourished  Neck: Neck supple  Cardiovascular: Normal rate, regular rhythm and normal heart sounds  Pulmonary/Chest: Effort normal and breath sounds normal  No stridor  No respiratory distress  He has no wheezes  He has no rales  He exhibits tenderness (Reproducible chest wall tenderness)  Abdominal: Soft  Bowel sounds are normal  He exhibits no distension and no mass  There is no tenderness  There is no rebound and no guarding  Neurological: He is alert and oriented to person, place, and time  Skin: Skin is warm  Additional Data:     Labs:    Results from last 7 days   Lab Units 08/13/19  0535 08/12/19  1850   WBC Thousand/uL 5 87 5 69   HEMOGLOBIN g/dL 13 8 14 4   HEMATOCRIT % 40 5 41 6   PLATELETS Thousands/uL 204 219   NEUTROS PCT %  --  50   LYMPHS PCT %  --  34   MONOS PCT %  --  9   EOS PCT %  --  6     Results from last 7 days   Lab Units 08/13/19  0536   SODIUM mmol/L 142   POTASSIUM mmol/L 3 5   CHLORIDE mmol/L 105   CO2 mmol/L 26   BUN mg/dL 10   CREATININE mg/dL 0 81   ANION GAP mmol/L 11   CALCIUM mg/dL 9 0   GLUCOSE RANDOM mg/dL 89                           * I Have Reviewed All Lab Data Listed Above  * Additional Pertinent Lab Tests Reviewed:  Rhea 65 Admission Reviewed    Imaging:    Imaging Reports Reviewed Today Include: None done today  Imaging Personally Reviewed by Myself Includes:        Recent Cultures (last 7 days):           Last 24 Hours Medication List:     Current Facility-Administered Medications:  aspirin 81 mg Oral Daily Ryan Lynch PA-C   atorvastatin 80 mg Oral Daily Ryan Lynch PA-C   co-enzyme Q-10 100 mg Oral Daily Ryan Lynch PA-C   diphenhydrAMINE 50 mg Oral Q8H PRN Ryan Lynch PA-C   ezetimibe 10 mg Oral Daily Ryan Lynch PA-C   heparin (porcine) 5,000 Units Subcutaneous Q8H [facility] Ryan Lynch PA-C   levothyroxine 25 mcg Oral Daily Ryan Lynch PA-C   sodium chloride (PF) 3 mL Intravenous PRN Ryan Lynch PA-C        Today, Patient Was Seen By: Sadie Aguirre MD    ** Please Note: Dictation voice to text software may have been used in the creation of this document   **

## 2019-08-14 LAB
ATRIAL RATE: 51 BPM
P AXIS: 74 DEGREES
PR INTERVAL: 168 MS
QRS AXIS: 94 DEGREES
QRSD INTERVAL: 76 MS
QT INTERVAL: 468 MS
QTC INTERVAL: 431 MS
T WAVE AXIS: 68 DEGREES
VENTRICULAR RATE: 51 BPM

## 2019-08-14 PROCEDURE — 93010 ELECTROCARDIOGRAM REPORT: CPT | Performed by: INTERNAL MEDICINE

## 2019-12-15 ENCOUNTER — HOSPITAL ENCOUNTER (EMERGENCY)
Facility: HOSPITAL | Age: 74
Discharge: HOME | End: 2019-12-15
Attending: EMERGENCY MEDICINE | Admitting: EMERGENCY MEDICINE
Payer: MEDICARE

## 2019-12-15 ENCOUNTER — APPOINTMENT (EMERGENCY)
Dept: CT IMAGING | Facility: HOSPITAL | Age: 74
End: 2019-12-15
Payer: MEDICARE

## 2019-12-15 DIAGNOSIS — N32.0 BLADDER OUTLET OBSTRUCTION: Primary | ICD-10-CM

## 2019-12-15 LAB
ACANTHOCYTES BLD QL SMEAR: PRESENT
ALBUMIN SERPL BCP-MCNC: 4.4 G/DL (ref 3.5–5)
ALP SERPL-CCNC: 105 U/L (ref 46–116)
ALT SERPL W P-5'-P-CCNC: 29 U/L (ref 12–78)
ANION GAP SERPL CALCULATED.3IONS-SCNC: 10 MMOL/L (ref 4–13)
APTT PPP: 34 SECONDS (ref 23–37)
AST SERPL W P-5'-P-CCNC: 32 U/L (ref 5–45)
BASOPHILS # BLD MANUAL: 0.08 THOUSAND/UL (ref 0–0.1)
BASOPHILS NFR MAR MANUAL: 1 % (ref 0–1)
BILIRUB SERPL-MCNC: 1.1 MG/DL (ref 0.2–1)
BILIRUB UR QL STRIP: NEGATIVE
BUN SERPL-MCNC: 8 MG/DL (ref 5–25)
CALCIUM SERPL-MCNC: 9.9 MG/DL (ref 8.3–10.1)
CHLORIDE SERPL-SCNC: 100 MMOL/L (ref 100–108)
CLARITY UR: CLEAR
CO2 SERPL-SCNC: 29 MMOL/L (ref 21–32)
COLOR UR: ABNORMAL
COLOR, POC: CLEAR
CREAT SERPL-MCNC: 0.92 MG/DL (ref 0.6–1.3)
EOSINOPHIL # BLD MANUAL: 0.17 THOUSAND/UL (ref 0–0.4)
EOSINOPHIL NFR BLD MANUAL: 2 % (ref 0–6)
ERYTHROCYTE [DISTWIDTH] IN BLOOD BY AUTOMATED COUNT: 12.3 % (ref 11.6–15.1)
EXT BILIRUBIN, UA: NEGATIVE
EXT BLOOD URINE: NEGATIVE
EXT GLUCOSE, UA: NEGATIVE
EXT KETONES: NORMAL
EXT NITRITE, UA: NEGATIVE
EXT PH, UA: 5
EXT PROTEIN, UA: NEGATIVE
EXT SPECIFIC GRAVITY, UA: 1
EXT UROBILINOGEN: 0.2
GFR SERPL CREATININE-BSD FRML MDRD: 81 ML/MIN/1.73SQ M
GLUCOSE SERPL-MCNC: 133 MG/DL (ref 65–140)
GLUCOSE UR STRIP-MCNC: NEGATIVE MG/DL
HCT VFR BLD AUTO: 46.1 % (ref 36.5–49.3)
HGB BLD-MCNC: 15.6 G/DL (ref 12–17)
HGB UR QL STRIP.AUTO: NEGATIVE
INR PPP: 1.05 (ref 0.84–1.19)
KETONES UR STRIP-MCNC: ABNORMAL MG/DL
LACTATE SERPL-SCNC: 1.6 MMOL/L (ref 0.5–2)
LEUKOCYTE ESTERASE UR QL STRIP: NEGATIVE
LIPASE SERPL-CCNC: 79 U/L (ref 73–393)
LYMPHOCYTES # BLD AUTO: 1.58 THOUSAND/UL (ref 0.6–4.47)
LYMPHOCYTES # BLD AUTO: 19 % (ref 14–44)
MCH RBC QN AUTO: 31.8 PG (ref 26.8–34.3)
MCHC RBC AUTO-ENTMCNC: 33.8 G/DL (ref 31.4–37.4)
MCV RBC AUTO: 94 FL (ref 82–98)
MONOCYTES # BLD AUTO: 1.17 THOUSAND/UL (ref 0–1.22)
MONOCYTES NFR BLD: 14 % (ref 4–12)
NEUTROPHILS # BLD MANUAL: 4.25 THOUSAND/UL (ref 1.85–7.62)
NEUTS BAND NFR BLD MANUAL: 3 % (ref 0–8)
NEUTS SEG NFR BLD AUTO: 48 % (ref 43–75)
NITRITE UR QL STRIP: NEGATIVE
NRBC BLD AUTO-RTO: 0 /100 WBCS
PH UR STRIP.AUTO: 5.5 [PH]
PLATELET # BLD AUTO: 279 THOUSANDS/UL (ref 149–390)
PLATELET BLD QL SMEAR: ADEQUATE
PMV BLD AUTO: 9.3 FL (ref 8.9–12.7)
POIKILOCYTOSIS BLD QL SMEAR: PRESENT
POTASSIUM SERPL-SCNC: 4 MMOL/L (ref 3.5–5.3)
PROT SERPL-MCNC: 8.1 G/DL (ref 6.4–8.2)
PROT UR STRIP-MCNC: NEGATIVE MG/DL
PROTHROMBIN TIME: 13.4 SECONDS (ref 11.6–14.5)
RBC # BLD AUTO: 4.9 MILLION/UL (ref 3.88–5.62)
RBC MORPH BLD: PRESENT
SODIUM SERPL-SCNC: 139 MMOL/L (ref 136–145)
SP GR UR STRIP.AUTO: <=1.005 (ref 1–1.03)
TOTAL CELLS COUNTED SPEC: 100
UROBILINOGEN UR QL STRIP.AUTO: 0.2 E.U./DL
VARIANT LYMPHS # BLD AUTO: 13 %
WBC # BLD AUTO: 8.34 THOUSAND/UL (ref 4.31–10.16)
WBC # BLD EST: NEGATIVE 10*3/UL

## 2019-12-15 PROCEDURE — 99285 EMERGENCY DEPT VISIT HI MDM: CPT | Performed by: EMERGENCY MEDICINE

## 2019-12-15 PROCEDURE — 83605 ASSAY OF LACTIC ACID: CPT | Performed by: EMERGENCY MEDICINE

## 2019-12-15 PROCEDURE — 85007 BL SMEAR W/DIFF WBC COUNT: CPT | Performed by: EMERGENCY MEDICINE

## 2019-12-15 PROCEDURE — 85610 PROTHROMBIN TIME: CPT | Performed by: EMERGENCY MEDICINE

## 2019-12-15 PROCEDURE — 83690 ASSAY OF LIPASE: CPT | Performed by: EMERGENCY MEDICINE

## 2019-12-15 PROCEDURE — 99284 EMERGENCY DEPT VISIT MOD MDM: CPT

## 2019-12-15 PROCEDURE — 36415 COLL VENOUS BLD VENIPUNCTURE: CPT | Performed by: EMERGENCY MEDICINE

## 2019-12-15 PROCEDURE — 81003 URINALYSIS AUTO W/O SCOPE: CPT | Performed by: EMERGENCY MEDICINE

## 2019-12-15 PROCEDURE — 74177 CT ABD & PELVIS W/CONTRAST: CPT

## 2019-12-15 PROCEDURE — 93005 ELECTROCARDIOGRAM TRACING: CPT

## 2019-12-15 PROCEDURE — 80053 COMPREHEN METABOLIC PANEL: CPT | Performed by: EMERGENCY MEDICINE

## 2019-12-15 PROCEDURE — 96360 HYDRATION IV INFUSION INIT: CPT

## 2019-12-15 PROCEDURE — 96361 HYDRATE IV INFUSION ADD-ON: CPT

## 2019-12-15 PROCEDURE — 85730 THROMBOPLASTIN TIME PARTIAL: CPT | Performed by: EMERGENCY MEDICINE

## 2019-12-15 PROCEDURE — 85027 COMPLETE CBC AUTOMATED: CPT | Performed by: EMERGENCY MEDICINE

## 2019-12-15 RX ADMIN — IOHEXOL 100 ML: 350 INJECTION, SOLUTION INTRAVENOUS at 19:38

## 2019-12-15 RX ADMIN — SODIUM CHLORIDE 1000 ML: 0.9 INJECTION, SOLUTION INTRAVENOUS at 18:30

## 2019-12-16 VITALS
DIASTOLIC BLOOD PRESSURE: 70 MMHG | BODY MASS INDEX: 22.49 KG/M2 | TEMPERATURE: 98.8 F | RESPIRATION RATE: 20 BRPM | SYSTOLIC BLOOD PRESSURE: 135 MMHG | WEIGHT: 147.93 LBS | OXYGEN SATURATION: 96 % | HEART RATE: 84 BPM

## 2019-12-16 LAB
ATRIAL RATE: 68 BPM
P AXIS: 72 DEGREES
PR INTERVAL: 152 MS
QRS AXIS: 103 DEGREES
QRSD INTERVAL: 78 MS
QT INTERVAL: 412 MS
QTC INTERVAL: 438 MS
T WAVE AXIS: 44 DEGREES
VENTRICULAR RATE: 68 BPM

## 2019-12-16 PROCEDURE — 93010 ELECTROCARDIOGRAM REPORT: CPT | Performed by: INTERNAL MEDICINE

## 2019-12-16 NOTE — ED PROVIDER NOTES
History  Chief Complaint   Patient presents with    Abdominal Pain     pt states he has been constipated lately  Pt took mirilax, had white bowel movement yesterday  No complaints today  History provided by:  Patient   used: No    Abdominal Pain   Associated symptoms: no chest pain, no chills, no cough, no diarrhea, no dysuria, no fever, no nausea, no shortness of breath, no sore throat and no vomiting      Patient is a 39-year-old male presenting to emergency department due to light bowel movement  Was constipated  Took MiraLax  Had a normal bowel movement then white  Has had intermittent epigastric pain  No nausea vomiting  No fevers or chills  No chest pain  No shortness breath  Recently was diagnosed with the UTI, took ciprofloxacin  Supposed to see Urology tomorrow  MDM abdominal labs, urine, ct to eval gb        Prior to Admission Medications   Prescriptions Last Dose Informant Patient Reported? Taking?    Cranberry 500 MG CAPS   Yes No   Sig: Take 500 mg by mouth daily   EPINEPHrine (EPIPEN) 0 3 mg/0 3 mL SOAJ   No No   Sig: Inject 0 3 mL (0 3 mg total) into a muscle once for 1 dose   Multiple Vitamin (MULTIVITAMIN) capsule   Yes No   Sig: Take 1 capsule by mouth daily   Omega-3 Fatty Acids (FISH OIL) 1,000 mg   Yes No   Sig: Take 1,000 mg by mouth daily   aspirin 81 mg chewable tablet   Yes No   Sig: Chew 81 mg daily   atorvastatin (LIPITOR) 80 mg tablet   Yes No   Sig: Take 80 mg by mouth daily   co-enzyme Q-10 30 MG capsule   Yes No   Sig: Take 100 mg by mouth daily   diphenhydrAMINE (BENADRYL) 25 mg tablet   No No   Sig: Take 2 tablets (50 mg total) by mouth every 8 (eight) hours as needed for itching   Patient not taking: Reported on 8/12/2019   ezetimibe (ZETIA) 10 mg tablet   Yes No   Sig: Take 10 mg by mouth daily   levothyroxine 25 mcg tablet   Yes No   Sig: Take 75 mcg by mouth daily    metoprolol tartrate (LOPRESSOR) 25 mg tablet   Yes No   Sig: Take 25 mg by mouth every 12 (twelve) hours      Facility-Administered Medications: None       Past Medical History:   Diagnosis Date    Cardiac disease     Disease of thyroid gland     Hematuria     Hyperlipidemia     Hypertension     UTI (urinary tract infection)        Past Surgical History:   Procedure Laterality Date    CARDIAC SURGERY      CORONARY ANGIOPLASTY WITH STENT PLACEMENT      HERNIA REPAIR         History reviewed  No pertinent family history  I have reviewed and agree with the history as documented  Social History     Tobacco Use    Smoking status: Former Smoker    Smokeless tobacco: Never Used   Substance Use Topics    Alcohol use: No    Drug use: No        Review of Systems   Constitutional: Negative for chills, diaphoresis and fever  HENT: Negative for congestion and sore throat  Respiratory: Negative for cough, shortness of breath, wheezing and stridor  Cardiovascular: Negative for chest pain, palpitations and leg swelling  Gastrointestinal: Positive for abdominal pain  Negative for blood in stool, diarrhea, nausea and vomiting  Genitourinary: Negative for dysuria, frequency and urgency  Musculoskeletal: Negative for neck pain and neck stiffness  Skin: Negative for pallor and rash  Neurological: Negative for dizziness, syncope, weakness, light-headedness and headaches  All other systems reviewed and are negative  Physical Exam  Physical Exam   Constitutional: He is oriented to person, place, and time  He appears well-developed and well-nourished  HENT:   Head: Normocephalic and atraumatic  Eyes: Pupils are equal, round, and reactive to light  Neck: Neck supple  Cardiovascular: Normal rate, regular rhythm, normal heart sounds and intact distal pulses  Pulmonary/Chest: Effort normal and breath sounds normal  No respiratory distress  Abdominal: Soft  Bowel sounds are normal  There is no tenderness     Neurological: He is alert and oriented to person, place, and time  Skin: Skin is warm and dry  Capillary refill takes less than 2 seconds  Vitals reviewed        Vital Signs  ED Triage Vitals [12/15/19 1820]   Temperature Pulse Respirations Blood Pressure SpO2   98 8 °F (37 1 °C) 77 18 (!) 179/100 95 %      Temp Source Heart Rate Source Patient Position - Orthostatic VS BP Location FiO2 (%)   Tympanic Monitor Lying Right arm --      Pain Score       3           Vitals:    12/15/19 1820 12/15/19 1828 12/15/19 2000   BP: (!) 179/100 132/67 135/70   Pulse: 77  84   Patient Position - Orthostatic VS: Lying  Lying         Visual Acuity      ED Medications  Medications   sodium chloride 0 9 % bolus 1,000 mL (0 mL Intravenous Stopped 12/15/19 2122)   iohexol (OMNIPAQUE) 350 MG/ML injection (MULTI-DOSE) 100 mL (100 mL Intravenous Given 12/15/19 1938)       Diagnostic Studies  Results Reviewed     Procedure Component Value Units Date/Time    UA w Reflex to Microscopic w Reflex to Culture [[id number]]  (Abnormal) Collected:  12/15/19 2053    Lab Status:  Final result Specimen:  Urine, Other Updated:  12/15/19 2104     Color, UA Straw     Clarity, UA Clear     Specific Gravity, UA <=1 005     pH, UA 5 5     Leukocytes, UA Negative     Nitrite, UA Negative     Protein, UA Negative mg/dl      Glucose, UA Negative mg/dl      Ketones, UA 15 (1+) mg/dl      Urobilinogen, UA 0 2 E U /dl      Bilirubin, UA Negative     Blood, UA Negative    POCT urinalysis dipstick [[id number]]  (Normal) Resulted:  12/15/19 2032    Lab Status:  Final result Specimen:  Urine Updated:  12/15/19 2033     Color, UA clear     Glucose, UA (Ref: Negative) negative     Bilirubin, UA (Ref: Negative) negative     Ketones, UA (Ref: Negative) small     Spec Grav, UA (Ref:1 003-1 030) 1 005     Blood, UA (Ref: Negative) negative     pH, UA (Ref: 4 5-8 0) 5 0     Protein, UA (Ref: Negative) negative     Urobilinogen, UA (Ref: 0 2- 1 0) 0 2      Leukocytes, UA (Ref: Negative) negative     Nitrite, UA (Ref: Negative) negative    CBC and differential [[id number]] Collected:  12/15/19 1828    Lab Status:  Final result Specimen:  Blood from Arm, Left Updated:  12/15/19 1943     WBC 8 34 Thousand/uL      RBC 4 90 Million/uL      Hemoglobin 15 6 g/dL      Hematocrit 46 1 %      MCV 94 fL      MCH 31 8 pg      MCHC 33 8 g/dL      RDW 12 3 %      MPV 9 3 fL      Platelets 627 Thousands/uL      nRBC 0 /100 WBCs     Narrative: This is an appended report  These results have been appended to a previously verified report  Lactic acid, plasma [[id number]]  (Normal) Collected:  12/15/19 1828    Lab Status:  Final result Specimen:  Blood from Arm, Left Updated:  12/15/19 1859     LACTIC ACID 1 6 mmol/L     Narrative:       Result may be elevated if tourniquet was used during collection      Comprehensive metabolic panel [[id number]]  (Abnormal) Collected:  12/15/19 1828    Lab Status:  Final result Specimen:  Blood from Arm, Left Updated:  12/15/19 1856     Sodium 139 mmol/L      Potassium 4 0 mmol/L      Chloride 100 mmol/L      CO2 29 mmol/L      ANION GAP 10 mmol/L      BUN 8 mg/dL      Creatinine 0 92 mg/dL      Glucose 133 mg/dL      Calcium 9 9 mg/dL      AST 32 U/L      ALT 29 U/L      Alkaline Phosphatase 105 U/L      Total Protein 8 1 g/dL      Albumin 4 4 g/dL      Total Bilirubin 1 10 mg/dL      eGFR 81 ml/min/1 73sq m     Narrative:       [place] guidelines for Chronic Kidney Disease (CKD):     Stage 1 with normal or high GFR (GFR > 90 mL/min/1 73 square meters)    Stage 2 Mild CKD (GFR = 60-89 mL/min/1 73 square meters)    Stage 3A Moderate CKD (GFR = 45-59 mL/min/1 73 square meters)    Stage 3B Moderate CKD (GFR = 30-44 mL/min/1 73 square meters)    Stage 4 Severe CKD (GFR = 15-29 mL/min/1 73 square meters)    Stage 5 End Stage CKD (GFR <15 mL/min/1 73 square meters)  Note: GFR calculation is accurate only with a steady state creatinine    Lipase [[id number]]  (Normal) Collected: 12/15/19 1828    Lab Status:  Final result Specimen:  Blood from Arm, Left Updated:  12/15/19 1856     Lipase 79 u/L     Protime-INR [[id number]]  (Normal) Collected:  12/15/19 1828    Lab Status:  Final result Specimen:  Blood from Arm, Left Updated:  12/15/19 1849     Protime 13 4 seconds      INR 1 05    APTT [[id number]]  (Normal) Collected:  12/15/19 1828    Lab Status:  Final result Specimen:  Blood from Arm, Left Updated:  12/15/19 1849     PTT 34 seconds                  CT abdomen pelvis with contrast   Final Result by Demond Salas MD (12/15 2008)      1  No acute inflammatory process in the abdomen or pelvis  2   Evidence of chronic bladder outlet obstruction with mild circumferential wall thickening, posterior right bladder diverticulum, and prostatomegaly  Superimposed acute cystitis cannot be entirely excluded though the appearance is similar to the prior    study  3   Focal protrusion of a vacuum disc at L2-3, not present previously  Workstation performed: [id number]                    Procedures  Procedures         ED Course  ED Course as of Dec 16 1458   Margarito Pranav Dec 15, 2019   1837 ECG shows rate of 68, sinus, right axis, normal QRS, no significant ST or T-wave changes, normal intervals, independently interpreted by me                                  MDM      Disposition  Final diagnoses:   Bladder outlet obstruction     Time reflects when diagnosis was documented in both MDM as applicable and the Disposition within this note     Time User Action Codes Description Comment    12/15/2019  9:15 PM Ava Salmeron Add [N32 0] Bladder outlet obstruction       ED Disposition     ED Disposition Condition Date/Time Comment    Discharge Stable Sun Dec 15, 2019  9:15 PM Albertina Yu discharge to home/self care              Follow-up Information     Follow up With Specialties Details Why Contact Info Yoel Flaherty [address] Emergency Department Emergency Medicine  As needed, If symptoms worsen [address] [place],[address] [address]  [phone]  ED, [address] [place], [address] [place] [address], [address]    Hardy Linares MD Family Medicine In 1 week re-evaluation [address]  [address] [facility] [address] [place] [address] Urology Call in 1 day Please follow-up for potential bladder outlet obstruction [address] [address] Steve WHITE [facility] [address]  701  [facility] [address] [place] [address], [address], [address], [address]          Discharge Medication List as of 12/15/2019  9:17 PM      CONTINUE these medications which have NOT CHANGED    Details   aspirin 81 mg chewable tablet Chew 81 mg daily, Historical Med      atorvastatin (LIPITOR) 80 mg tablet Take 80 mg by mouth daily, Historical Med      co-enzyme Q-10 30 MG capsule Take 100 mg by mouth daily, Historical Med      Cranberry 500 MG CAPS Take 500 mg by mouth daily, Historical Med      diphenhydrAMINE (BENADRYL) 25 mg tablet Take 2 tablets (50 mg total) by mouth every 8 (eight) hours as needed for itching, Starting Mon 12/17/2018, Print      EPINEPHrine (EPIPEN) 0 3 mg/0 3 mL SOAJ Inject 0 3 mL (0 3 mg total) into a muscle once for 1 dose, Starting Mon 12/17/2018, Print      ezetimibe (ZETIA) 10 mg tablet Take 10 mg by mouth daily, Historical Med      levothyroxine 25 mcg tablet Take 75 mcg by mouth daily , Historical Med      metoprolol tartrate (LOPRESSOR) 25 mg tablet Take 25 mg by mouth every 12 (twelve) hours, Historical Med      Multiple Vitamin (MULTIVITAMIN) capsule Take 1 capsule by mouth daily, Historical Med      Omega-3 Fatty Acids (FISH OIL) 1,000 mg Take 1,000 mg by mouth daily, Historical Med           No discharge procedures on file      ED Provider  Electronically Signed by           Bisi David MD  12/16/19 2136

## 2019-12-21 ENCOUNTER — HOSPITAL ENCOUNTER (EMERGENCY)
Facility: HOSPITAL | Age: 74
Discharge: HOME | End: 2019-12-22
Attending: EMERGENCY MEDICINE | Admitting: EMERGENCY MEDICINE
Payer: MEDICARE

## 2019-12-21 VITALS
TEMPERATURE: 97.8 F | DIASTOLIC BLOOD PRESSURE: 68 MMHG | OXYGEN SATURATION: 97 % | SYSTOLIC BLOOD PRESSURE: 131 MMHG | HEIGHT: 68 IN | WEIGHT: 150.13 LBS | HEART RATE: 63 BPM | BODY MASS INDEX: 22.75 KG/M2 | RESPIRATION RATE: 20 BRPM

## 2019-12-21 DIAGNOSIS — R25.1 OCCASIONAL TREMORS: Primary | ICD-10-CM

## 2019-12-21 DIAGNOSIS — F41.9 ANXIETY: ICD-10-CM

## 2019-12-21 DIAGNOSIS — K59.00 CONSTIPATION: ICD-10-CM

## 2019-12-21 PROCEDURE — 93005 ELECTROCARDIOGRAM TRACING: CPT

## 2019-12-21 PROCEDURE — 96360 HYDRATION IV INFUSION INIT: CPT

## 2019-12-21 PROCEDURE — 80053 COMPREHEN METABOLIC PANEL: CPT | Performed by: EMERGENCY MEDICINE

## 2019-12-21 PROCEDURE — 36415 COLL VENOUS BLD VENIPUNCTURE: CPT | Performed by: EMERGENCY MEDICINE

## 2019-12-21 PROCEDURE — 83735 ASSAY OF MAGNESIUM: CPT | Performed by: EMERGENCY MEDICINE

## 2019-12-21 PROCEDURE — 85007 BL SMEAR W/DIFF WBC COUNT: CPT | Performed by: EMERGENCY MEDICINE

## 2019-12-21 PROCEDURE — 85027 COMPLETE CBC AUTOMATED: CPT | Performed by: EMERGENCY MEDICINE

## 2019-12-21 PROCEDURE — 99285 EMERGENCY DEPT VISIT HI MDM: CPT | Performed by: EMERGENCY MEDICINE

## 2019-12-21 PROCEDURE — 99284 EMERGENCY DEPT VISIT MOD MDM: CPT

## 2019-12-21 RX ADMIN — SODIUM CHLORIDE 1000 ML: 0.9 INJECTION, SOLUTION INTRAVENOUS at 23:41

## 2019-12-22 LAB
ALBUMIN SERPL BCP-MCNC: 3.7 G/DL (ref 3.5–5)
ALP SERPL-CCNC: 82 U/L (ref 46–116)
ALT SERPL W P-5'-P-CCNC: 25 U/L (ref 12–78)
ANION GAP SERPL CALCULATED.3IONS-SCNC: 7 MMOL/L (ref 4–13)
AST SERPL W P-5'-P-CCNC: 25 U/L (ref 5–45)
BASOPHILS # BLD MANUAL: 0 THOUSAND/UL (ref 0–0.1)
BASOPHILS NFR MAR MANUAL: 0 % (ref 0–1)
BILIRUB SERPL-MCNC: 0.8 MG/DL (ref 0.2–1)
BUN SERPL-MCNC: 7 MG/DL (ref 5–25)
CALCIUM SERPL-MCNC: 9.3 MG/DL (ref 8.3–10.1)
CHLORIDE SERPL-SCNC: 105 MMOL/L (ref 100–108)
CO2 SERPL-SCNC: 31 MMOL/L (ref 21–32)
CREAT SERPL-MCNC: 0.83 MG/DL (ref 0.6–1.3)
EOSINOPHIL # BLD MANUAL: 0.12 THOUSAND/UL (ref 0–0.4)
EOSINOPHIL NFR BLD MANUAL: 2 % (ref 0–6)
ERYTHROCYTE [DISTWIDTH] IN BLOOD BY AUTOMATED COUNT: 12.4 % (ref 11.6–15.1)
GFR SERPL CREATININE-BSD FRML MDRD: 86 ML/MIN/1.73SQ M
GLUCOSE SERPL-MCNC: 112 MG/DL (ref 65–140)
HCT VFR BLD AUTO: 39.3 % (ref 36.5–49.3)
HGB BLD-MCNC: 13.6 G/DL (ref 12–17)
LYMPHOCYTES # BLD AUTO: 2.73 THOUSAND/UL (ref 0.6–4.47)
LYMPHOCYTES # BLD AUTO: 44 % (ref 14–44)
MAGNESIUM SERPL-MCNC: 2 MG/DL (ref 1.6–2.6)
MCH RBC QN AUTO: 32.5 PG (ref 26.8–34.3)
MCHC RBC AUTO-ENTMCNC: 34.6 G/DL (ref 31.4–37.4)
MCV RBC AUTO: 94 FL (ref 82–98)
MONOCYTES # BLD AUTO: 0.25 THOUSAND/UL (ref 0–1.22)
MONOCYTES NFR BLD: 4 % (ref 4–12)
NEUTROPHILS # BLD MANUAL: 2.79 THOUSAND/UL (ref 1.85–7.62)
NEUTS SEG NFR BLD AUTO: 45 % (ref 43–75)
NRBC BLD AUTO-RTO: 0 /100 WBCS
PLATELET # BLD AUTO: 226 THOUSANDS/UL (ref 149–390)
PLATELET BLD QL SMEAR: ADEQUATE
PMV BLD AUTO: 9.4 FL (ref 8.9–12.7)
POTASSIUM SERPL-SCNC: 4.2 MMOL/L (ref 3.5–5.3)
PROT SERPL-MCNC: 6.9 G/DL (ref 6.4–8.2)
RBC # BLD AUTO: 4.19 MILLION/UL (ref 3.88–5.62)
RBC MORPH BLD: NORMAL
SODIUM SERPL-SCNC: 143 MMOL/L (ref 136–145)
TOTAL CELLS COUNTED SPEC: 100
VARIANT LYMPHS # BLD AUTO: 5 %
WBC # BLD AUTO: 6.21 THOUSAND/UL (ref 4.31–10.16)

## 2019-12-22 NOTE — ED PROVIDER NOTES
History  Chief Complaint   Patient presents with    Constipation     Pt has been constipated x 4 days, took miralax today and started to have shaking in his limbs  Pt denies pain  Pt states hes worried about the miralax and thinks he is nervous  50-year-old male with past medical history of anxiety presents for evaluation of a shaking episode, feeling very nervous after he got to his friend's house today he states that he has been having intermittent problems with constipation, he took MiraLax at 15:00 today, went to his friend's house and felt very shaky  No abdominal pain, no chest pain, no shortness of breath or no diaphoresis  No urinary complaints, last bowel movement was 3 days ago and was somewhat harder than normal though no blood or melena  Patient states that this evening he started thinking about all the possible things that could go wrong with him because of his constipation including possibly getting bowel resection with a stoma like his niece and became very nervous and felt very shaky  His friends became concerned because he was shaking and called the ambulance  Patient states the symptoms completely resolved prior to his arrival to the emergency department has no complaints at this time          Prior to Admission Medications   Prescriptions Last Dose Informant Patient Reported? Taking?    Cranberry 500 MG CAPS   Yes No   Sig: Take 500 mg by mouth daily   EPINEPHrine (EPIPEN) 0 3 mg/0 3 mL SOAJ   No No   Sig: Inject 0 3 mL (0 3 mg total) into a muscle once for 1 dose   Multiple Vitamin (MULTIVITAMIN) capsule   Yes No   Sig: Take 1 capsule by mouth daily   Omega-3 Fatty Acids (FISH OIL) 1,000 mg   Yes No   Sig: Take 1,000 mg by mouth daily   aspirin 81 mg chewable tablet   Yes No   Sig: Chew 81 mg daily   atorvastatin (LIPITOR) 80 mg tablet   Yes No   Sig: Take 80 mg by mouth daily   co-enzyme Q-10 30 MG capsule   Yes No   Sig: Take 100 mg by mouth daily   diphenhydrAMINE (BENADRYL) 25 mg tablet   No No   Sig: Take 2 tablets (50 mg total) by mouth every 8 (eight) hours as needed for itching   Patient not taking: Reported on 8/12/2019   ezetimibe (ZETIA) 10 mg tablet   Yes No   Sig: Take 10 mg by mouth daily   levothyroxine 25 mcg tablet   Yes No   Sig: Take 75 mcg by mouth daily    metoprolol tartrate (LOPRESSOR) 25 mg tablet   Yes No   Sig: Take 25 mg by mouth every 12 (twelve) hours      Facility-Administered Medications: None       Past Medical History:   Diagnosis Date    Cardiac disease     Disease of thyroid gland     Hematuria     Hyperlipidemia     Hypertension     UTI (urinary tract infection)        Past Surgical History:   Procedure Laterality Date    CARDIAC SURGERY      CORONARY ANGIOPLASTY WITH STENT PLACEMENT      HERNIA REPAIR         History reviewed  No pertinent family history  I have reviewed and agree with the history as documented  Social History     Tobacco Use    Smoking status: Former Smoker    Smokeless tobacco: Never Used   Substance Use Topics    Alcohol use: No    Drug use: No        Review of Systems   Constitutional: Negative for appetite change, chills and fever  HENT: Negative for rhinorrhea and sore throat  Eyes: Negative for photophobia and visual disturbance  Respiratory: Negative for cough and shortness of breath  Cardiovascular: Negative for chest pain and palpitations  Gastrointestinal: Positive for constipation  Negative for abdominal pain and diarrhea  Genitourinary: Negative for dysuria, frequency and urgency  Skin: Negative for rash  Neurological: Positive for tremors  Negative for dizziness and weakness  Psychiatric/Behavioral: The patient is nervous/anxious  All other systems reviewed and are negative  Physical Exam  Physical Exam   Constitutional: He is oriented to person, place, and time  He appears well-developed and well-nourished  HENT:   Head: Normocephalic and atraumatic     Right Ear: External ear normal    Left Ear: External ear normal    Mouth/Throat: Oropharynx is clear and moist    Eyes: Pupils are equal, round, and reactive to light  Conjunctivae and EOM are normal    Neck: Normal range of motion  Neck supple  No JVD present  No tracheal deviation present  Cardiovascular: Normal rate, regular rhythm and normal heart sounds  Exam reveals no gallop and no friction rub  No murmur heard  Pulmonary/Chest: Effort normal  No stridor  No respiratory distress  He has no wheezes  He has no rales  Abdominal: Soft  He exhibits no distension and no mass  There is no tenderness  There is no rebound and no guarding  Soft nontender abdomen no rebound or guarding   Musculoskeletal: Normal range of motion  He exhibits no edema  Neurological: He is alert and oriented to person, place, and time  No cranial nerve deficit  No rigidity, no clonus   Skin: Skin is warm and dry  No rash noted  No erythema  No pallor  Psychiatric: He has a normal mood and affect  Nursing note and vitals reviewed        Vital Signs  ED Triage Vitals   Temperature Pulse Respirations Blood Pressure SpO2   12/21/19 2243 12/21/19 2243 12/21/19 2243 12/21/19 2246 12/21/19 2243   97 8 °F (36 6 °C) 73 16 128/62 100 %      Temp Source Heart Rate Source Patient Position - Orthostatic VS BP Location FiO2 (%)   12/21/19 2243 12/21/19 2243 12/21/19 2243 12/21/19 2246 --   Temporal Monitor Lying Right arm       Pain Score       12/21/19 2243       No Pain           Vitals:    12/21/19 2243 12/21/19 2246 12/21/19 2300 12/21/19 2330   BP:  128/62 128/62 131/68   Pulse: 73  66 63   Patient Position - Orthostatic VS: Lying Lying Lying Lying         Visual Acuity      ED Medications  Medications   sodium chloride 0 9 % bolus 1,000 mL (1,000 mL Intravenous New Bag 12/21/19 2341)       Diagnostic Studies  Results Reviewed     Procedure Component Value Units Date/Time    Comprehensive metabolic panel [[id number]] Collected:  12/21/19 2340    Lab Status: Final result Specimen:  Blood from Arm, Right Updated:  12/22/19 0006     Sodium 143 mmol/L      Potassium 4 2 mmol/L      Chloride 105 mmol/L      CO2 31 mmol/L      ANION GAP 7 mmol/L      BUN 7 mg/dL      Creatinine 0 83 mg/dL      Glucose 112 mg/dL      Calcium 9 3 mg/dL      AST 25 U/L      ALT 25 U/L      Alkaline Phosphatase 82 U/L      Total Protein 6 9 g/dL      Albumin 3 7 g/dL      Total Bilirubin 0 80 mg/dL      eGFR 86 ml/min/1 73sq m     Narrative:       [place] guidelines for Chronic Kidney Disease (CKD):     Stage 1 with normal or high GFR (GFR > 90 mL/min/1 73 square meters)    Stage 2 Mild CKD (GFR = 60-89 mL/min/1 73 square meters)    Stage 3A Moderate CKD (GFR = 45-59 mL/min/1 73 square meters)    Stage 3B Moderate CKD (GFR = 30-44 mL/min/1 73 square meters)    Stage 4 Severe CKD (GFR = 15-29 mL/min/1 73 square meters)    Stage 5 End Stage CKD (GFR <15 mL/min/1 73 square meters)  Note: GFR calculation is accurate only with a steady state creatinine    Magnesium [[id number]]  (Normal) Collected:  12/21/19 2340    Lab Status:  Final result Specimen:  Blood from Arm, Right Updated:  12/22/19 0006     Magnesium 2 0 mg/dL     CBC and differential [[id number]]  (Normal) Collected:  12/21/19 2340    Lab Status:  Preliminary result Specimen:  Blood from Arm, Right Updated:  12/21/19 2347     WBC 6 21 Thousand/uL      RBC 4 19 Million/uL      Hemoglobin 13 6 g/dL      Hematocrit 39 3 %      MCV 94 fL      MCH 32 5 pg      MCHC 34 6 g/dL      RDW 12 4 %      MPV 9 4 fL      Platelets 109 Thousands/uL                  No orders to display              Procedures  Procedures         ED Course  ED Course as of Dec 22 0019   Sat Dec 21, 2019   2334 Procedure Note: EKG  Date/Time: 12/21/19 11:34 PM   Performed by: Mira Varghese  Authorized by: Mira Varghese  Indications / Diagnosis: Tremors  ECG reviewed by me, the ED Provider: yes   The EKG demonstrates:  Rhythm: normal sinus  Intervals: normal intervals  Axis: normal axis  QRS/Blocks: normal QRS  ST Changes: No acute ST Changes, no STD/DENNISE  Artifact , poor baseline          Sun Dec 22, 2019   0019 Repeat EKG with no ST abnormalities, 1 PVC noted, rightward axis                                  MDM  Number of Diagnoses or Management Options  Diagnosis management comments: 50-year-old male with anxiety, tremors and feeling very anxious about feeling constipated, currently no complaints, had a full workup including CT of the abdomen, lab work 5 days ago, states that he has been having constipation on and off for quite some time and is currently on MiraLax, no abdominal pain currently, will check electrolytes, EKG, will likely discharge with Gastroenterology follow-up        Disposition  Final diagnoses:   Occasional tremors   Anxiety   Constipation     Time reflects when diagnosis was documented in both MDM as applicable and the Disposition within this note     Time User Action Codes Description Comment    12/22/2019 12:16 AM Carloyn Halon Add [R25 1] Occasional tremors     12/22/2019 12:16 AM Carloyn Halon Add [F41 9] Anxiety     12/22/2019 12:16 AM Carloyn Halon Add [K59 00] Constipation       ED Disposition     ED Disposition Condition Date/Time Comment    Discharge Stable Sun Dec 22, 2019 12:16 AM Daralyn Crooked discharge to home/self care  Follow-up Information     Follow up With Specialties Details Why Contact Info Additional Information    Latia Del Toro MD Family Medicine Schedule an appointment as soon as possible for a visit   [address] Emergency Department Emergency Medicine  If symptoms worsen [address] [place], [address]  [phone]  ED, [address] [place], [place] [address]    [address] [facility]ology Schedule an appointment as soon as possible for a visit   [address] [place] [id number]  [facility] [address], [address] [phone]          Patient's Medications   Discharge Prescriptions    No medications on file     No discharge procedures on file      ED Provider  Electronically Signed by           Lavinia Al MD  12/22/19 5522       Lavinia Al MD  12/22/19 0035

## 2019-12-22 NOTE — DISCHARGE INSTRUCTIONS
Please follow-up with the primary care provider for further care, if symptoms worsen please return to emergency depart

## 2019-12-26 LAB
ATRIAL RATE: 65 BPM
ATRIAL RATE: 70 BPM
P AXIS: 58 DEGREES
P AXIS: 69 DEGREES
PR INTERVAL: 148 MS
PR INTERVAL: 148 MS
QRS AXIS: 96 DEGREES
QRS AXIS: 99 DEGREES
QRSD INTERVAL: 78 MS
QRSD INTERVAL: 80 MS
QT INTERVAL: 424 MS
QT INTERVAL: 430 MS
QTC INTERVAL: 440 MS
QTC INTERVAL: 464 MS
T WAVE AXIS: 50 DEGREES
T WAVE AXIS: 51 DEGREES
VENTRICULAR RATE: 65 BPM
VENTRICULAR RATE: 70 BPM

## 2019-12-26 PROCEDURE — 93010 ELECTROCARDIOGRAM REPORT: CPT | Performed by: INTERNAL MEDICINE

## 2019-12-27 ENCOUNTER — HOSPITAL ENCOUNTER (EMERGENCY)
Facility: HOSPITAL | Age: 74
Discharge: HOME | End: 2019-12-27
Attending: EMERGENCY MEDICINE | Admitting: EMERGENCY MEDICINE
Payer: MEDICARE

## 2019-12-27 VITALS
BODY MASS INDEX: 22.75 KG/M2 | TEMPERATURE: 97.8 F | OXYGEN SATURATION: 98 % | WEIGHT: 150.13 LBS | DIASTOLIC BLOOD PRESSURE: 73 MMHG | HEART RATE: 69 BPM | RESPIRATION RATE: 18 BRPM | HEIGHT: 68 IN | SYSTOLIC BLOOD PRESSURE: 165 MMHG

## 2019-12-27 DIAGNOSIS — K59.00 CONSTIPATION: Primary | ICD-10-CM

## 2019-12-27 DIAGNOSIS — R42 LIGHTHEADEDNESS: ICD-10-CM

## 2019-12-27 PROCEDURE — 99283 EMERGENCY DEPT VISIT LOW MDM: CPT

## 2019-12-27 PROCEDURE — 93005 ELECTROCARDIOGRAM TRACING: CPT

## 2019-12-27 PROCEDURE — 99284 EMERGENCY DEPT VISIT MOD MDM: CPT | Performed by: EMERGENCY MEDICINE

## 2019-12-27 RX ORDER — SODIUM PHOSPHATE, DIBASIC AND SODIUM PHOSPHATE, MONOBASIC 7; 19 G/133ML; G/133ML
1 ENEMA RECTAL ONCE
Status: COMPLETED | OUTPATIENT
Start: 2019-12-27 | End: 2019-12-27

## 2019-12-27 RX ADMIN — SODIUM PHOSPHATE, DIBASIC AND SODIUM PHOSPHATE, MONOBASIC 1 ENEMA: 7; 19 ENEMA RECTAL at 20:42

## 2019-12-28 NOTE — DISCHARGE INSTRUCTIONS
Take Miralax (1 capful mixed in water, available over the counter) once daily for the next 7 days  Follow up with your physician if you continue having issues with constipation or lightheadedness  Constipation   WHAT YOU NEED TO KNOW:   Constipation is when you have hard, dry bowel movements, or you go longer than usual between bowel movements  DISCHARGE INSTRUCTIONS:   Return to the emergency department if:   You have blood in your bowel movements  You have a fever and abdominal pain with the constipation  Contact your healthcare provider if:   Your constipation gets worse  You start to vomit  You have questions or concerns about your condition or care  Medicines:   Medicine or a fiber supplement  may help make your bowel movement softer  A laxative may help relax and loosen your intestines to help you have a bowel movement  You may also be given medicine to increase fluid in your intestines  The fluid may help move bowel movements through your intestines  Take your medicine as directed  Contact your healthcare provider if you think your medicine is not helping or if you have side effects  Tell him of her if you are allergic to any medicine  Keep a list of the medicines, vitamins, and herbs you take  Include the amounts, and when and why you take them  Bring the list or the pill bottles to follow-up visits  Carry your medicine list with you in case of an emergency  Manage your constipation:   Drink liquids as directed  You may need to drink extra liquids to help soften and move your bowels  Ask how much liquid to drink each day and which liquids are best for you  Eat high-fiber foods  This may help decrease constipation by adding bulk to your bowel movements  High-fiber foods include fruit, vegetables, whole-grain breads and cereals, and beans  Your healthcare provider or dietitian can help you create a high-fiber meal plan  Exercise regularly    Regular physical activity can help stimulate your intestines  Ask which exercises are best for you  Schedule a time each day to have a bowel movement  This may help train your body to have regular bowel movements  Bend forward while you are on the toilet to help move the bowel movement out  Sit on the toilet for at least 10 minutes, even if you do not have a bowel movement  Follow up with your healthcare provider as directed:  Write down your questions so you remember to ask them during your visits  © 2017 [facility] Information is for End User's use only and may not be sold, redistributed or otherwise used for commercial purposes  All illustrations and images included in CareNotes® are the copyrighted property of A D A M , Inc  or Jose Luis Chávez  The above information is an  only  It is not intended as medical advice for individual conditions or treatments  Talk to your doctor, nurse or pharmacist before following any medical regimen to see if it is safe and effective for you  Lightheadedness   WHAT YOU NEED TO KNOW:   Lightheadedness is the feeling that you may faint, but you do not  Your heartbeat may be fast or feel like it flutters  Lightheadedness may occur when you take certain medicines, such as medicine to lower your blood pressure  Dehydration, low sodium, low blood sugar, an abnormal heart rhythm, and anxiety are other common causes  DISCHARGE INSTRUCTIONS:   Return to the emergency department if:   You have sudden chest pain  You have trouble breathing or shortness of breath  You have vision changes, are sweating, and have nausea while you are sitting or lying down  You feel flushed and your heart is fluttering  You faint  Contact your healthcare provider if:   You feel lightheaded often  Your heart beats faster or slower than usual      You have questions or concerns about your condition or care  Follow up with your healthcare provider as directed:   You may need more tests to help find the cause of your lightheadedness  The tests will help healthcare providers plan the best treatment for you  Write down your questions so you remember to ask them during your visits  Self-care:  Talk with your healthcare provider about these and other ways to manage your symptoms:  Lie down  when you feel lightheaded, your throat gets tight, or your vision changes  Raise your legs above the level of your heart  Stand up slowly  Sit on the side of the bed or couch for a few minutes before you stand up  Take slow, deep breaths when you feel lightheaded  This can help decrease the feeling that you might faint  Ask if you need to avoid hot baths and saunas  These may make your symptoms worse  Watch for signs of low blood sugar: These include hunger, nervousness, sweating, and fast or fluttery heartbeats  Talk with your healthcare provider about ways to keep your blood sugar level steady  Check your blood pressure often:  You should do this especially if you take medicine to lower your blood pressure  Check your blood pressure when you are lying down and when you are standing  Ask how often to check during the day  Keep a record of your blood pressure numbers  Your healthcare provider may use the record to help plan your treatment  Keep a record of your lightheadedness episodes:  Include your symptoms and your activity before and after the episode  The record can help your healthcare provider find the cause of your lightheadedness and help you manage episodes  © 2017 [facility] Information is for End User's use only and may not be sold, redistributed or otherwise used for commercial purposes  All illustrations and images included in CareNotes® are the copyrighted property of A D A M , Inc  or Jose Luis Chávez  The above information is an  only  It is not intended as medical advice for individual conditions or treatments   Talk to your doctor, nurse or pharmacist before following any medical regimen to see if it is safe and effective for you

## 2019-12-28 NOTE — ED PROVIDER NOTES
History  Chief Complaint   Patient presents with    Constipation     Pt c/o of not having a bowel movemnt since the 24th  Pt sttses he was feeling light headed and alsmot passed out, pt states he ha slower left sided abdominal pain  78-year-old male presents for evaluation of constipation  Patient states last bowel movement was on the 24th of this month  He attempted a Fleet enema on the 25th without success  Patient took MiraLax x1 and Metamucil x2 throughout the day today without improvement  He went to an urgent care center for this problem earlier this evening; however, became very lightheaded with onset of a transient sharp pain in the left lower quadrant radiating to the epigastrium  No current pain or lightheadedness  No episodes of syncope  No chest pain or shortness of breath  He denies any nausea or vomiting  No prior abdominal surgeries  He denies any rectal bleeding or pain  No recent illness  Patient states he is scheduled for colonoscopy next week  History provided by:  Patient  Constipation   Severity:  Moderate  Time since last bowel movement:  3 days  Timing:  Constant  Progression:  Worsening  Chronicity:  New  Stool description:  None produced  Relieved by:  Nothing  Worsened by:  Nothing  Ineffective treatments: Miralax, enema, metamucil  Associated symptoms: abdominal pain    Associated symptoms: no diarrhea, no dysuria, no fever, no nausea and no vomiting    Abdominal pain:     Location:  LLQ    Quality: sharp      Severity:  Moderate    Onset quality:  Sudden    Duration: less than 1 minute  Timing:  Sporadic    Progression:  Resolved    Chronicity:  New  Risk factors: no hx of abdominal surgery, no recent illness and no recent travel        Prior to Admission Medications   Prescriptions Last Dose Informant Patient Reported? Taking?    Cranberry 500 MG CAPS   Yes No   Sig: Take 500 mg by mouth daily   EPINEPHrine (EPIPEN) 0 3 mg/0 3 mL SOAJ   No No   Sig: Inject 0 3 mL (0 3 mg total) into a muscle once for 1 dose   Multiple Vitamin (MULTIVITAMIN) capsule   Yes No   Sig: Take 1 capsule by mouth daily   Omega-3 Fatty Acids (FISH OIL) 1,000 mg   Yes No   Sig: Take 1,000 mg by mouth daily   aspirin 81 mg chewable tablet   Yes No   Sig: Chew 81 mg daily   atorvastatin (LIPITOR) 80 mg tablet   Yes No   Sig: Take 80 mg by mouth daily   co-enzyme Q-10 30 MG capsule   Yes No   Sig: Take 100 mg by mouth daily   diphenhydrAMINE (BENADRYL) 25 mg tablet   No No   Sig: Take 2 tablets (50 mg total) by mouth every 8 (eight) hours as needed for itching   Patient not taking: Reported on 2019   ezetimibe (ZETIA) 10 mg tablet   Yes No   Sig: Take 10 mg by mouth daily   levothyroxine 25 mcg tablet   Yes No   Sig: Take 75 mcg by mouth daily    metoprolol tartrate (LOPRESSOR) 25 mg tablet   Yes No   Sig: Take 25 mg by mouth every 12 (twelve) hours      Facility-Administered Medications: None       Past Medical History:   Diagnosis Date    Cardiac disease     Disease of thyroid gland     Hematuria     Hyperlipidemia     Hypertension     Renal calculi     UTI (urinary tract infection)        Past Surgical History:   Procedure Laterality Date    CARDIAC SURGERY      CORONARY ANGIOPLASTY WITH STENT PLACEMENT      CORONARY ARTERY BYPASS GRAFT      HERNIA REPAIR         Family History   Problem Relation Age of Onset    Heart disease Mother     Hypertension Mother     Lung cancer Father         smoker     I have reviewed and agree with the history as documented  Social History     Tobacco Use    Smoking status: Former Smoker     Types: Cigarettes     Last attempt to quit:      Years since quittin 0    Smokeless tobacco: Never Used   Substance Use Topics    Alcohol use: No     Comment: socially    Drug use: No        Review of Systems   Constitutional: Negative for appetite change, diaphoresis, fatigue and fever  HENT: Negative for congestion, rhinorrhea and sore throat  Respiratory: Negative for cough, chest tightness and shortness of breath  Cardiovascular: Negative for chest pain, palpitations and leg swelling  Gastrointestinal: Positive for abdominal pain and constipation  Negative for diarrhea, nausea and vomiting  Genitourinary: Negative for difficulty urinating, dysuria, frequency and hematuria  Musculoskeletal: Negative for myalgias, neck pain and neck stiffness  Skin: Negative for pallor  Neurological: Positive for light-headedness  Negative for syncope, weakness and headaches  All other systems reviewed and are negative  Physical Exam  Physical Exam   Constitutional: He appears well-developed and well-nourished  Non-toxic appearance  No distress  HENT:   Head: Normocephalic and atraumatic  Eyes: Pupils are equal, round, and reactive to light  EOM are normal    Neck: Normal range of motion  No tracheal deviation present  No thyromegaly present  Cardiovascular: Normal rate, regular rhythm, normal heart sounds and intact distal pulses  Pulmonary/Chest: Effort normal and breath sounds normal    Abdominal: Soft  Bowel sounds are normal  He exhibits no distension  There is no tenderness  Lymphadenopathy:     He has no cervical adenopathy  Skin: Skin is warm and dry  He is not diaphoretic  Nursing note and vitals reviewed        Vital Signs  ED Triage Vitals [12/27/19 2010]   Temperature Pulse Respirations Blood Pressure SpO2   97 8 °F (36 6 °C) 73 18 165/73 97 %      Temp Source Heart Rate Source Patient Position - Orthostatic VS BP Location FiO2 (%)   Oral Monitor -- -- --      Pain Score       No Pain           Vitals:    12/27/19 2010 12/27/19 2030   BP: 165/73    Pulse: 73 69         Visual Acuity      ED Medications  Medications   sodium phosphate-biphosphate (FLEET) enema 1 enema (1 enema Rectal Given 12/27/19 2042)       Diagnostic Studies  Results Reviewed     None                 No orders to display              Procedures  ECG 12 Lead Documentation Only  Date/Time: 12/27/2019 9:25 PM  Performed by: Ai Latif MD  Authorized by: Ai Latif MD     Indications / Diagnosis:  Lightheadedness  ECG reviewed by me, the ED Provider: yes    Patient location:  ED  Previous ECG:     Previous ECG:  Compared to current    Comparison ECG info:  12/21/2019 normal sinus rhythm with PVC    Similarity:  Changes noted (PVCs no longer present)  Interpretation:     Interpretation: abnormal    Rate:     ECG rate:  59    ECG rate assessment: bradycardic    Rhythm:     Rhythm: sinus bradycardia    Ectopy:     Ectopy: none    QRS:     QRS axis:  Normal    QRS intervals:  Normal  Conduction:     Conduction: normal    ST segments:     ST segments:  Normal  T waves:     T waves: flattening      Flattening:  AVL and V6             ED Course                               MDM  Number of Diagnoses or Management Options  Constipation: new and requires workup  Lightheadedness: new and requires workup  Diagnosis management comments: 67-year-old male presents for evaluation of lightheadedness and constipation  EKG unremarkable  No current lightheadedness  Possible vagal response  Enema with production of bowel movement and resolution of symptoms  PCP follow up as needed  Patient has an appointment with GI next week  Discussed return precautions with patient      Patient Progress  Patient progress: resolved        Disposition  Final diagnoses:   Constipation   Lightheadedness     Time reflects when diagnosis was documented in both MDM as applicable and the Disposition within this note     Time User Action Codes Description Comment    12/27/2019  9:00 PM Edman Ing Add [K59 00] Constipation     12/27/2019  9:00 PM AkashTigist mcgee Emperor Add [R42] Episodic lightheadedness     12/27/2019  9:02 PM Edman Ing Add [R42] Lightheadedness     12/27/2019  9:02 PM AkashTigist mcgee Emperor Remove [R42] Episodic lightheadedness       ED Disposition     ED Disposition Condition Date/Time Comment    Discharge Stable Fri Dec 27, 2019  9:00 PM Zeinab Chandra discharge to home/self care  Follow-up Information     Follow up With Specialties Details Why Contact Info Additional Information    Yash Jerome MD Family Medicine Schedule an appointment as soon as possible for a visit in 3 days As needed if lightheadedness recurs [address]   [address] [facility] Emergency Department Emergency Medicine Go to  If symptoms worsen, loss of consciousness, severe abdominal pain, chest pain or shortness of breath [address] [address] [phone]  ED, [address] [place] [address], [place], Luige [address]          Discharge Medication List as of 12/27/2019  9:03 PM      CONTINUE these medications which have NOT CHANGED    Details   aspirin 81 mg chewable tablet Chew 81 mg daily, Historical Med      atorvastatin (LIPITOR) 80 mg tablet Take 80 mg by mouth daily, Historical Med      co-enzyme Q-10 30 MG capsule Take 100 mg by mouth daily, Historical Med      Cranberry 500 MG CAPS Take 500 mg by mouth daily, Historical Med      diphenhydrAMINE (BENADRYL) 25 mg tablet Take 2 tablets (50 mg total) by mouth every 8 (eight) hours as needed for itching, Starting Mon 12/17/2018, Print      EPINEPHrine (EPIPEN) 0 3 mg/0 3 mL SOAJ Inject 0 3 mL (0 3 mg total) into a muscle once for 1 dose, Starting Mon 12/17/2018, Print      ezetimibe (ZETIA) 10 mg tablet Take 10 mg by mouth daily, Historical Med      levothyroxine 25 mcg tablet Take 75 mcg by mouth daily , Historical Med      metoprolol tartrate (LOPRESSOR) 25 mg tablet Take 25 mg by mouth every 12 (twelve) hours, Historical Med      Multiple Vitamin (MULTIVITAMIN) capsule Take 1 capsule by mouth daily, Historical Med      Omega-3 Fatty Acids (FISH OIL) 1,000 mg Take 1,000 mg by mouth daily, Historical Med           No discharge procedures on file      ED Provider  Electronically Signed by           Jony Hernandez MD  12/27/19 1104

## 2019-12-28 NOTE — ED NOTES
Patient ringing bell stating he is finished with enema   Pt states ,"oh my god I feel so much better"     Real Morin, RN  12/27/19 2102

## 2019-12-30 LAB
ATRIAL RATE: 59 BPM
P AXIS: 49 DEGREES
PR INTERVAL: 118 MS
QRS AXIS: 88 DEGREES
QRSD INTERVAL: 82 MS
QT INTERVAL: 424 MS
QTC INTERVAL: 419 MS
T WAVE AXIS: 65 DEGREES
VENTRICULAR RATE: 59 BPM

## 2019-12-30 PROCEDURE — 93010 ELECTROCARDIOGRAM REPORT: CPT | Performed by: INTERNAL MEDICINE

## 2021-07-16 ENCOUNTER — APPOINTMENT (INPATIENT)
Dept: NON INVASIVE DIAGNOSTICS | Facility: HOSPITAL | Age: 76
DRG: 246 | End: 2021-07-16
Payer: MEDICARE

## 2021-07-16 ENCOUNTER — HOSPITAL ENCOUNTER (INPATIENT)
Facility: HOSPITAL | Age: 76
LOS: 2 days | Discharge: HOME | DRG: 246 | End: 2021-07-18
Attending: EMERGENCY MEDICINE | Admitting: INTERNAL MEDICINE
Payer: MEDICARE

## 2021-07-16 ENCOUNTER — APPOINTMENT (EMERGENCY)
Dept: NON INVASIVE DIAGNOSTICS | Facility: HOSPITAL | Age: 76
DRG: 246 | End: 2021-07-16
Attending: INTERNAL MEDICINE
Payer: MEDICARE

## 2021-07-16 DIAGNOSIS — R07.9 CHEST PAIN: ICD-10-CM

## 2021-07-16 DIAGNOSIS — I21.3 STEMI (ST ELEVATION MYOCARDIAL INFARCTION) (HCC): ICD-10-CM

## 2021-07-16 DIAGNOSIS — I21.21 ST ELEVATION MYOCARDIAL INFARCTION INVOLVING LEFT CIRCUMFLEX CORONARY ARTERY (HCC): Primary | ICD-10-CM

## 2021-07-16 DIAGNOSIS — Z98.61 CAD S/P PERCUTANEOUS CORONARY ANGIOPLASTY: ICD-10-CM

## 2021-07-16 DIAGNOSIS — I25.10 CAD S/P PERCUTANEOUS CORONARY ANGIOPLASTY: ICD-10-CM

## 2021-07-16 LAB
ALBUMIN SERPL BCP-MCNC: 3.8 G/DL (ref 3.5–5)
ALP SERPL-CCNC: 112 U/L (ref 46–116)
ALT SERPL W P-5'-P-CCNC: 27 U/L (ref 12–78)
ANION GAP SERPL CALCULATED.3IONS-SCNC: 9 MMOL/L (ref 4–13)
APTT PPP: 34 SECONDS (ref 23–37)
AST SERPL W P-5'-P-CCNC: 26 U/L (ref 5–45)
ATRIAL RATE: 75 BPM
ATRIAL RATE: 82 BPM
BASOPHILS # BLD AUTO: 0.05 THOUSANDS/ΜL (ref 0–0.1)
BASOPHILS NFR BLD AUTO: 1 % (ref 0–1)
BILIRUB SERPL-MCNC: 0.9 MG/DL (ref 0.2–1)
BUN SERPL-MCNC: 12 MG/DL (ref 5–25)
CALCIUM SERPL-MCNC: 9.6 MG/DL (ref 8.3–10.1)
CHLORIDE SERPL-SCNC: 102 MMOL/L (ref 100–108)
CHOLEST SERPL-MCNC: 155 MG/DL (ref 50–200)
CO2 SERPL-SCNC: 25 MMOL/L (ref 21–32)
CREAT SERPL-MCNC: 1.06 MG/DL (ref 0.6–1.3)
EOSINOPHIL # BLD AUTO: 0.32 THOUSAND/ΜL (ref 0–0.61)
EOSINOPHIL NFR BLD AUTO: 4 % (ref 0–6)
ERYTHROCYTE [DISTWIDTH] IN BLOOD BY AUTOMATED COUNT: 12.5 % (ref 11.6–15.1)
EST. AVERAGE GLUCOSE BLD GHB EST-MCNC: 154 MG/DL
GFR SERPL CREATININE-BSD FRML MDRD: 67 ML/MIN/1.73SQ M
GLUCOSE SERPL-MCNC: 248 MG/DL (ref 65–140)
HBA1C MFR BLD: 7 %
HCT VFR BLD AUTO: 46.4 % (ref 36.5–49.3)
HDLC SERPL-MCNC: 59 MG/DL
HGB BLD-MCNC: 16 G/DL (ref 12–17)
IMM GRANULOCYTES # BLD AUTO: 0.01 THOUSAND/UL (ref 0–0.2)
IMM GRANULOCYTES NFR BLD AUTO: 0 % (ref 0–2)
INR PPP: 1 (ref 0.84–1.19)
KCT BLD-ACNC: 249 SEC (ref 89–137)
LDLC SERPL CALC-MCNC: 87 MG/DL (ref 0–100)
LYMPHOCYTES # BLD AUTO: 3.68 THOUSANDS/ΜL (ref 0.6–4.47)
LYMPHOCYTES NFR BLD AUTO: 44 % (ref 14–44)
MCH RBC QN AUTO: 32.7 PG (ref 26.8–34.3)
MCHC RBC AUTO-ENTMCNC: 34.5 G/DL (ref 31.4–37.4)
MCV RBC AUTO: 95 FL (ref 82–98)
MONOCYTES # BLD AUTO: 0.62 THOUSAND/ΜL (ref 0.17–1.22)
MONOCYTES NFR BLD AUTO: 8 % (ref 4–12)
NEUTROPHILS # BLD AUTO: 3.46 THOUSANDS/ΜL (ref 1.85–7.62)
NEUTS SEG NFR BLD AUTO: 43 % (ref 43–75)
NRBC BLD AUTO-RTO: 0 /100 WBCS
NT-PROBNP SERPL-MCNC: 180 PG/ML
P AXIS: 38 DEGREES
P AXIS: 75 DEGREES
PLATELET # BLD AUTO: 199 THOUSANDS/UL (ref 149–390)
PLATELET # BLD AUTO: 228 THOUSANDS/UL (ref 149–390)
PMV BLD AUTO: 10 FL (ref 8.9–12.7)
PMV BLD AUTO: 10.3 FL (ref 8.9–12.7)
POTASSIUM SERPL-SCNC: 3.6 MMOL/L (ref 3.5–5.3)
PR INTERVAL: 192 MS
PR INTERVAL: 206 MS
PROT SERPL-MCNC: 7.9 G/DL (ref 6.4–8.2)
PROTHROMBIN TIME: 13.2 SECONDS (ref 11.6–14.5)
QRS AXIS: 105 DEGREES
QRS AXIS: 105 DEGREES
QRSD INTERVAL: 86 MS
QRSD INTERVAL: 88 MS
QT INTERVAL: 350 MS
QT INTERVAL: 364 MS
QTC INTERVAL: 406 MS
QTC INTERVAL: 408 MS
RBC # BLD AUTO: 4.89 MILLION/UL (ref 3.88–5.62)
SODIUM SERPL-SCNC: 136 MMOL/L (ref 136–145)
SPECIMEN SOURCE: ABNORMAL
T WAVE AXIS: 90 DEGREES
T WAVE AXIS: 93 DEGREES
TRIGL SERPL-MCNC: 46 MG/DL
TROPONIN I SERPL-MCNC: 0.45 NG/ML
TROPONIN I SERPL-MCNC: 3.32 NG/ML
TSH SERPL DL<=0.05 MIU/L-ACNC: 0.8 UIU/ML (ref 0.36–3.74)
VENTRICULAR RATE: 75 BPM
VENTRICULAR RATE: 82 BPM
WBC # BLD AUTO: 8.14 THOUSAND/UL (ref 4.31–10.16)

## 2021-07-16 PROCEDURE — C1894 INTRO/SHEATH, NON-LASER: HCPCS | Performed by: INTERNAL MEDICINE

## 2021-07-16 PROCEDURE — 85025 COMPLETE CBC W/AUTO DIFF WBC: CPT | Performed by: EMERGENCY MEDICINE

## 2021-07-16 PROCEDURE — 80061 LIPID PANEL: CPT | Performed by: NURSE PRACTITIONER

## 2021-07-16 PROCEDURE — 83880 ASSAY OF NATRIURETIC PEPTIDE: CPT | Performed by: INTERNAL MEDICINE

## 2021-07-16 PROCEDURE — 93005 ELECTROCARDIOGRAM TRACING: CPT

## 2021-07-16 PROCEDURE — 92978 ENDOLUMINL IVUS OCT C 1ST: CPT | Performed by: INTERNAL MEDICINE

## 2021-07-16 PROCEDURE — 93459 L HRT ART/GRFT ANGIO: CPT | Performed by: INTERNAL MEDICINE

## 2021-07-16 PROCEDURE — 36415 COLL VENOUS BLD VENIPUNCTURE: CPT | Performed by: INTERNAL MEDICINE

## 2021-07-16 PROCEDURE — 99223 1ST HOSP IP/OBS HIGH 75: CPT | Performed by: PHYSICIAN ASSISTANT

## 2021-07-16 PROCEDURE — 85610 PROTHROMBIN TIME: CPT | Performed by: EMERGENCY MEDICINE

## 2021-07-16 PROCEDURE — C1769 GUIDE WIRE: HCPCS | Performed by: INTERNAL MEDICINE

## 2021-07-16 PROCEDURE — 96374 THER/PROPH/DIAG INJ IV PUSH: CPT

## 2021-07-16 PROCEDURE — 93306 TTE W/DOPPLER COMPLETE: CPT

## 2021-07-16 PROCEDURE — C1725 CATH, TRANSLUMIN NON-LASER: HCPCS | Performed by: INTERNAL MEDICINE

## 2021-07-16 PROCEDURE — C1887 CATHETER, GUIDING: HCPCS | Performed by: INTERNAL MEDICINE

## 2021-07-16 PROCEDURE — 93306 TTE W/DOPPLER COMPLETE: CPT | Performed by: INTERNAL MEDICINE

## 2021-07-16 PROCEDURE — 027034Z DILATION OF CORONARY ARTERY, ONE ARTERY WITH DRUG-ELUTING INTRALUMINAL DEVICE, PERCUTANEOUS APPROACH: ICD-10-PCS | Performed by: INTERNAL MEDICINE

## 2021-07-16 PROCEDURE — 4A023N7 MEASUREMENT OF CARDIAC SAMPLING AND PRESSURE, LEFT HEART, PERCUTANEOUS APPROACH: ICD-10-PCS | Performed by: INTERNAL MEDICINE

## 2021-07-16 PROCEDURE — 85049 AUTOMATED PLATELET COUNT: CPT | Performed by: NURSE PRACTITIONER

## 2021-07-16 PROCEDURE — 96375 TX/PRO/DX INJ NEW DRUG ADDON: CPT

## 2021-07-16 PROCEDURE — 99152 MOD SED SAME PHYS/QHP 5/>YRS: CPT | Performed by: INTERNAL MEDICINE

## 2021-07-16 PROCEDURE — 84443 ASSAY THYROID STIM HORMONE: CPT | Performed by: NURSE PRACTITIONER

## 2021-07-16 PROCEDURE — C1757 CATH, THROMBECTOMY/EMBOLECT: HCPCS | Performed by: INTERNAL MEDICINE

## 2021-07-16 PROCEDURE — B3101ZZ FLUOROSCOPY OF THORACIC AORTA USING LOW OSMOLAR CONTRAST: ICD-10-PCS | Performed by: INTERNAL MEDICINE

## 2021-07-16 PROCEDURE — NC001 PR NO CHARGE: Performed by: NURSE PRACTITIONER

## 2021-07-16 PROCEDURE — C1874 STENT, COATED/COV W/DEL SYS: HCPCS

## 2021-07-16 PROCEDURE — C1753 CATH, INTRAVAS ULTRASOUND: HCPCS | Performed by: INTERNAL MEDICINE

## 2021-07-16 PROCEDURE — 80053 COMPREHEN METABOLIC PANEL: CPT | Performed by: INTERNAL MEDICINE

## 2021-07-16 PROCEDURE — 92941 PRQ TRLML REVSC TOT OCCL AMI: CPT | Performed by: INTERNAL MEDICINE

## 2021-07-16 PROCEDURE — 99291 CRITICAL CARE FIRST HOUR: CPT | Performed by: EMERGENCY MEDICINE

## 2021-07-16 PROCEDURE — NC001 PR NO CHARGE: Performed by: INTERNAL MEDICINE

## 2021-07-16 PROCEDURE — 02703ZZ DILATION OF CORONARY ARTERY, ONE ARTERY, PERCUTANEOUS APPROACH: ICD-10-PCS | Performed by: INTERNAL MEDICINE

## 2021-07-16 PROCEDURE — 83036 HEMOGLOBIN GLYCOSYLATED A1C: CPT | Performed by: NURSE PRACTITIONER

## 2021-07-16 PROCEDURE — 85347 COAGULATION TIME ACTIVATED: CPT

## 2021-07-16 PROCEDURE — 99285 EMERGENCY DEPT VISIT HI MDM: CPT

## 2021-07-16 PROCEDURE — 93010 ELECTROCARDIOGRAM REPORT: CPT | Performed by: INTERNAL MEDICINE

## 2021-07-16 PROCEDURE — 84484 ASSAY OF TROPONIN QUANT: CPT | Performed by: INTERNAL MEDICINE

## 2021-07-16 PROCEDURE — 85730 THROMBOPLASTIN TIME PARTIAL: CPT | Performed by: EMERGENCY MEDICINE

## 2021-07-16 PROCEDURE — 1123F ACP DISCUSS/DSCN MKR DOCD: CPT | Performed by: EMERGENCY MEDICINE

## 2021-07-16 PROCEDURE — B2111ZZ FLUOROSCOPY OF MULTIPLE CORONARY ARTERIES USING LOW OSMOLAR CONTRAST: ICD-10-PCS | Performed by: INTERNAL MEDICINE

## 2021-07-16 PROCEDURE — C1760 CLOSURE DEV, VASC: HCPCS | Performed by: INTERNAL MEDICINE

## 2021-07-16 PROCEDURE — C9606 PERC D-E COR REVASC W AMI S: HCPCS | Performed by: INTERNAL MEDICINE

## 2021-07-16 PROCEDURE — 84484 ASSAY OF TROPONIN QUANT: CPT | Performed by: NURSE PRACTITIONER

## 2021-07-16 RX ORDER — EZETIMIBE 10 MG/1
10 TABLET ORAL DAILY
Status: DISCONTINUED | OUTPATIENT
Start: 2021-07-16 | End: 2021-07-18 | Stop reason: HOSPADM

## 2021-07-16 RX ORDER — LIDOCAINE HYDROCHLORIDE 10 MG/ML
INJECTION, SOLUTION EPIDURAL; INFILTRATION; INTRACAUDAL; PERINEURAL CODE/TRAUMA/SEDATION MEDICATION
Status: COMPLETED | OUTPATIENT
Start: 2021-07-16 | End: 2021-07-16

## 2021-07-16 RX ORDER — HEPARIN SODIUM 1000 [USP'U]/ML
INJECTION, SOLUTION INTRAVENOUS; SUBCUTANEOUS CODE/TRAUMA/SEDATION MEDICATION
Status: COMPLETED | OUTPATIENT
Start: 2021-07-16 | End: 2021-07-16

## 2021-07-16 RX ORDER — SODIUM CHLORIDE 9 MG/ML
100 INJECTION, SOLUTION INTRAVENOUS CONTINUOUS
Status: DISPENSED | OUTPATIENT
Start: 2021-07-16 | End: 2021-07-16

## 2021-07-16 RX ORDER — ASPIRIN 81 MG/1
81 TABLET, CHEWABLE ORAL DAILY
Status: DISCONTINUED | OUTPATIENT
Start: 2021-07-17 | End: 2021-07-18 | Stop reason: HOSPADM

## 2021-07-16 RX ORDER — VERAPAMIL HCL 2.5 MG/ML
AMPUL (ML) INTRAVENOUS CODE/TRAUMA/SEDATION MEDICATION
Status: COMPLETED | OUTPATIENT
Start: 2021-07-16 | End: 2021-07-16

## 2021-07-16 RX ORDER — METOPROLOL TARTRATE 50 MG/1
25 TABLET, FILM COATED ORAL EVERY 12 HOURS SCHEDULED
Status: DISCONTINUED | OUTPATIENT
Start: 2021-07-16 | End: 2021-07-16

## 2021-07-16 RX ORDER — FENTANYL CITRATE 50 UG/ML
INJECTION, SOLUTION INTRAMUSCULAR; INTRAVENOUS CODE/TRAUMA/SEDATION MEDICATION
Status: COMPLETED | OUTPATIENT
Start: 2021-07-16 | End: 2021-07-16

## 2021-07-16 RX ORDER — ASPIRIN 81 MG/1
81 TABLET, CHEWABLE ORAL DAILY
Status: DISCONTINUED | OUTPATIENT
Start: 2021-07-16 | End: 2021-07-16

## 2021-07-16 RX ORDER — ATORVASTATIN CALCIUM 80 MG/1
80 TABLET, FILM COATED ORAL DAILY
Status: DISCONTINUED | OUTPATIENT
Start: 2021-07-17 | End: 2021-07-16

## 2021-07-16 RX ORDER — MIDAZOLAM HYDROCHLORIDE 2 MG/2ML
INJECTION, SOLUTION INTRAMUSCULAR; INTRAVENOUS CODE/TRAUMA/SEDATION MEDICATION
Status: COMPLETED | OUTPATIENT
Start: 2021-07-16 | End: 2021-07-16

## 2021-07-16 RX ORDER — ATORVASTATIN CALCIUM 80 MG/1
80 TABLET, FILM COATED ORAL DAILY
Status: DISCONTINUED | OUTPATIENT
Start: 2021-07-16 | End: 2021-07-18 | Stop reason: HOSPADM

## 2021-07-16 RX ORDER — SODIUM CHLORIDE 9 MG/ML
INJECTION, SOLUTION INTRAVENOUS
Status: COMPLETED | OUTPATIENT
Start: 2021-07-16 | End: 2021-07-16

## 2021-07-16 RX ORDER — LEVOTHYROXINE SODIUM 0.07 MG/1
75 TABLET ORAL
Status: DISCONTINUED | OUTPATIENT
Start: 2021-07-17 | End: 2021-07-18 | Stop reason: HOSPADM

## 2021-07-16 RX ORDER — NITROGLYCERIN 20 MG/100ML
INJECTION INTRAVENOUS CODE/TRAUMA/SEDATION MEDICATION
Status: COMPLETED | OUTPATIENT
Start: 2021-07-16 | End: 2021-07-16

## 2021-07-16 RX ORDER — ACETAMINOPHEN 325 MG/1
650 TABLET ORAL EVERY 6 HOURS PRN
Status: DISCONTINUED | OUTPATIENT
Start: 2021-07-16 | End: 2021-07-18 | Stop reason: HOSPADM

## 2021-07-16 RX ORDER — NITROGLYCERIN 0.4 MG/1
0.4 TABLET SUBLINGUAL
Status: DISCONTINUED | OUTPATIENT
Start: 2021-07-16 | End: 2021-07-18 | Stop reason: HOSPADM

## 2021-07-16 RX ORDER — HEPARIN SODIUM 5000 [USP'U]/ML
5000 INJECTION, SOLUTION INTRAVENOUS; SUBCUTANEOUS EVERY 8 HOURS SCHEDULED
Status: DISCONTINUED | OUTPATIENT
Start: 2021-07-16 | End: 2021-07-18 | Stop reason: HOSPADM

## 2021-07-16 RX ADMIN — Medication 200 MCG: at 10:01

## 2021-07-16 RX ADMIN — ATORVASTATIN CALCIUM 80 MG: 80 TABLET, FILM COATED ORAL at 15:32

## 2021-07-16 RX ADMIN — LIDOCAINE HYDROCHLORIDE 10 ML: 10 INJECTION, SOLUTION EPIDURAL; INFILTRATION; INTRACAUDAL; PERINEURAL at 09:43

## 2021-07-16 RX ADMIN — HEPARIN SODIUM 4000 UNITS: 1000 INJECTION INTRAVENOUS; SUBCUTANEOUS at 09:21

## 2021-07-16 RX ADMIN — HEPARIN SODIUM 5000 UNITS: 5000 INJECTION INTRAVENOUS; SUBCUTANEOUS at 21:06

## 2021-07-16 RX ADMIN — IOHEXOL 100 ML: 350 INJECTION, SOLUTION INTRAVENOUS at 10:25

## 2021-07-16 RX ADMIN — TICAGRELOR 180 MG: 90 TABLET ORAL at 09:20

## 2021-07-16 RX ADMIN — Medication 12.5 MG: at 21:06

## 2021-07-16 RX ADMIN — VERAPAMIL HYDROCHLORIDE 100 MCG: 2.5 INJECTION, SOLUTION INTRAVENOUS at 10:15

## 2021-07-16 RX ADMIN — SODIUM CHLORIDE 100 ML/HR: 0.9 INJECTION, SOLUTION INTRAVENOUS at 12:11

## 2021-07-16 RX ADMIN — Medication 200 MCG: at 10:28

## 2021-07-16 RX ADMIN — VERAPAMIL HYDROCHLORIDE 100 MCG: 2.5 INJECTION, SOLUTION INTRAVENOUS at 10:12

## 2021-07-16 RX ADMIN — VERAPAMIL HYDROCHLORIDE 100 MCG: 2.5 INJECTION, SOLUTION INTRAVENOUS at 10:21

## 2021-07-16 RX ADMIN — TICAGRELOR 90 MG: 90 TABLET ORAL at 23:53

## 2021-07-16 RX ADMIN — IOHEXOL 110 ML: 350 INJECTION, SOLUTION INTRAVENOUS at 10:45

## 2021-07-16 RX ADMIN — HEPARIN SODIUM 3000 UNITS: 1000 INJECTION INTRAVENOUS; SUBCUTANEOUS at 09:58

## 2021-07-16 RX ADMIN — HEPARIN SODIUM 3000 UNITS: 1000 INJECTION INTRAVENOUS; SUBCUTANEOUS at 09:46

## 2021-07-16 RX ADMIN — FENTANYL CITRATE 50 MCG: 50 INJECTION INTRAMUSCULAR; INTRAVENOUS at 10:42

## 2021-07-16 RX ADMIN — MIDAZOLAM 2 MG: 1 INJECTION INTRAMUSCULAR; INTRAVENOUS at 09:43

## 2021-07-16 RX ADMIN — FENTANYL CITRATE 50 MCG: 50 INJECTION INTRAMUSCULAR; INTRAVENOUS at 09:43

## 2021-07-16 RX ADMIN — EZETIMIBE 10 MG: 10 TABLET ORAL at 15:33

## 2021-07-16 RX ADMIN — SODIUM CHLORIDE 100 ML/HR: 0.9 INJECTION, SOLUTION INTRAVENOUS at 09:40

## 2021-07-16 NOTE — DISCHARGE SUMMARY
Discharge Summary - Chasidy Ear 67 y o  male MRN: [id number]    Unit/Bed#: [facility] 594-80 Encounter: [id number]    Admission Date: 7/16/2021   Discharge Date: 7/18/2021    Disposition: Home      OP Cardiologist: Dr aKpil Hoover at [facility]  Interventional cardiologist: Dr Kathy Stephenson    Discharge Diagnosis: STEMI    Secondary Diagnoses:   HTN  Carotid disease  Hypothyroidism      Condition at Discharge: fair   Consultants: none  Procedures: cardiac cath 7/16, echocardiogram     /66   Pulse 56   Temp 97 8 °F (36 6 °C) (Oral)   Resp (!) 10   Ht 5' 8" (1 727 m)   Wt 67 6 kg (149 lb 0 5 oz)   SpO2 97%   BMI 22 66 kg/m²      Review of Systems   Constitutional: Negative for chills, decreased appetite, diaphoresis, fever and malaise/fatigue  Cardiovascular: Negative for chest pain, dyspnea on exertion, leg swelling, near-syncope, orthopnea, palpitations, paroxysmal nocturnal dyspnea and syncope  Respiratory: Negative for hemoptysis, shortness of breath and wheezing  Hematologic/Lymphatic: Negative for bleeding problem  Does not bruise/bleed easily  Gastrointestinal: Negative for bloating, abdominal pain, constipation, diarrhea, nausea and vomiting  Neurological: Negative for dizziness, light-headedness, numbness, seizures and vertigo  Psychiatric/Behavioral: Negative for altered mental status and hallucinations  The patient is not nervous/anxious  All other systems reviewed and are negative  Physical Exam  Vitals reviewed  Constitutional:       General: He is not in acute distress  Appearance: Normal appearance  He is not ill-appearing or diaphoretic  HENT:      Head: Normocephalic and atraumatic  Right Ear: External ear normal       Left Ear: External ear normal       Nose: Nose normal    Eyes:      General:         Right eye: No discharge  Left eye: No discharge  Cardiovascular:      Rate and Rhythm: Normal rate and regular rhythm  Pulses: Normal pulses        Heart sounds: No murmur heard  No friction rub  No gallop  Pulmonary:      Effort: Pulmonary effort is normal       Breath sounds: Normal breath sounds  No wheezing, rhonchi or rales  Abdominal:      General: Abdomen is flat  Bowel sounds are normal  There is no distension  Palpations: Abdomen is soft  Tenderness: There is no abdominal tenderness  Musculoskeletal:         General: No deformity or signs of injury  Cervical back: Neck supple  No rigidity or tenderness  Right lower leg: No edema  Left lower leg: No edema  Skin:     General: Skin is dry  Capillary Refill: Capillary refill takes less than 2 seconds  Coloration: Skin is not jaundiced  Neurological:      General: No focal deficit present  Mental Status: He is alert and oriented to person, place, and time  Mental status is at baseline  Psychiatric:         Mood and Affect: Mood normal          Behavior: Behavior normal          Thought Content: Thought content normal          Judgment: Judgment normal             HPI and Hospital Course:     Mr Juany Lindo is a 67year old male with past hx of CAD with prior CABG 1992 and subsequent PCIs in 2005  He has been following with cardiology yearly at [facility]  He had been feeling in his normal state of health when out eating breakfast on 7/16 he had an abrupt onset of chest pain and general ill feeling  EMS was called and pre-hospital MI alert activated for ST elevations  Presenting troponin 0 45  Cath performed showing  critical stenosis with thrombus in the OM 1  Underwent PCI of the proximal circumflex and OM1 with drug-eluting stents  Also had 70% stenosis at site of prior RCA stent that was not intervened upon  Post cath he remained chest pain free  Echo showed EF 65%  Moderate MR, moderate to severe AS  He remained hospitalized until 7/18/21 for monitoring  On day of discharge he was feeling well  He was able to ambulate without any symptoms   Labs and VS stable  Cr on day of discharge was 0 81  He will require DAPT for at least one year with ASA and Brilinta (cost checked: 30 days free, then $427 for 30 days due to high co-pay, patient agreeable)  Continue on high intensity statin and beta blocker  He will follow up with his primary cardiologist Dr Walker Soto on 7/28/2021  Discharge Medications:  See after visit summary for reconciled discharge medications provided to patient and family  Pertinent Labs/diagnostics:  Results from last 7 days   Lab Units 07/16/21  1208 07/16/21  0941   TROPONIN I ng/mL 3 32* 0 45*     CBC with diff:   Results from last 7 days   Lab Units 07/16/21  1208 07/16/21  0941   WBC Thousand/uL  --  8 14   HEMOGLOBIN g/dL  --  16 0   HEMATOCRIT %  --  46 4   MCV fL  --  95   PLATELETS Thousands/uL 199 228   MCH pg  --  32 7   MCHC g/dL  --  34 5   RDW %  --  12 5   MPV fL 10 0 10 3   NRBC AUTO /100 WBCs  --  0     CMP:  Results from last 7 days   Lab Units 07/16/21  0941   POTASSIUM mmol/L 3 6   CHLORIDE mmol/L 102   CO2 mmol/L 25   BUN mg/dL 12   CREATININE mg/dL 1 06   CALCIUM mg/dL 9 6   AST U/L 26   ALT U/L 27   ALK PHOS U/L 112   EGFR ml/min/1 73sq m 67     Lipid Profile:   Lab Results   Component Value Date    CHOL 174 08/30/2014    CHOL 182 02/25/2014     Lab Results   Component Value Date    HDL 59 07/16/2021    HDL 51 08/13/2019    HDL 50 08/30/2014     Lab Results   Component Value Date    LDLCALC 87 07/16/2021    LDLCALC 88 08/13/2019    LDLCALC 104 (H) 08/30/2014     Lab Results   Component Value Date    TRIG 46 07/16/2021    TRIG 63 08/13/2019    TRIG 98 08/30/2014     Discharge instructions/Information to patient and family:   See after visit summary for information provided to patient and family  Provisions for Follow-Up Care:  See after visit summary for information related to follow-up care and any pertinent home health orders        Planned Readmission: No    Discharge Statement   I spent 45 minutes minutes discharging the patient  This time was spent on the day of discharge  I had direct contact with the patient on the day of discharge  Additional documentation is required if more than 30 minutes were spent on discharge  ** Please Note: Dragon 360 Dictation voice to text software may have been used in the creation of this document   **

## 2021-07-16 NOTE — ASSESSMENT & PLAN NOTE
· Inferior STEMI  · Taken to cath lab  of LAD with collateralization, PCI with HARIKA to proximal circ and OM1 (felt to be culprit lesion)  · Tele monitoring for re-perfusion arrhythmia  · ASA, statin, zetia, lopressor 12 5  · ECHO pending   · R femoral access, small hematoma, monitor site  · Will check lipid panel, a1c

## 2021-07-16 NOTE — H&P
History and Physical   General Cardiology  Josh Mcneil 67 y o  male MRN: [id number]  Unit/Bed#: BE CATH LAB ROOM Encounter: [id number]    OP Cardiologist: Dr Preston Dickey at [facility]  PCP: Dr Serge Merida    Principal Problem: STEMI     INPATIENT     HPI: Josh Mcneil is a 67y o  year old male with past hx of CAD with prior CABG x3 1992 and PCI/stenting 2005, HTN, HLD and carotid stenosis  He presented to [facility] ED today with complaints of chest pain  He had been feeling in his normal state of health and was out to breakfast this AM when he had an abrupt onset of chest pain and feeling of generally unwell  EMS was called by others in the restaurant (he was there alone); pre-hospital EKG showed ST elevations and pre-hospital MI alert was activated  Pt was taken for urgent cardiac cath which revealed :   "CORONARY CIRCULATION:  Proximal LAD: There was a 100 % stenosis  This lesion is a chronic total occlusion  Proximal circumflex: There was a tubular 75 % stenosis at the site of a prior stent  An intervention was performed  1st obtuse marginal: There was a 99 % stenosis at the site of a prior stent  The lesion was associated with a large filling defect consistent with thrombus  There was DANIEL grade 2 flow through the vessel (partial perfusion)  This lesion is  a likely culprit for the patient's recent myocardial infarction  An intervention was performed  Proximal RCA: There was a tubular 70 % stenosis at the site of a prior stent  It appears amenable to percutaneous intervention  Graft to the 1st obtuse marginal: There was a 100 % stenosis at the proximal anastomosis  It does not appear amenable to intervention  Graft to the distal RCA: There was a 100 % stenosis at the proximal anastomosis  It does not appear amenable to intervention "    He underwent PCI and HARIKA to proximal circumflex and OM! 1 (culprit lesion)  Post procedures pt was chest pain free and feeling much better  He was admitted to critical care unit; at time of my exam he reports being pain free  No palpitations, SOB, LE edema or orthopnea  He does report hx of CABG in  and Stenting in ; he thinks 3 stents  He follows with Cardiologist Dr Narendra Anderson at [facility]; sees yearly; no recent testing or visits  Would like to continue to follow with him upon discharge  He has been taking ASA, statin, BB  No prior echo or other cardiac testing available to review  Contacting [place] to obtain records  Review of Systems   Constitutional: Negative for decreased appetite and fever  Cardiovascular: Negative for chest pain, leg swelling, orthopnea, palpitations and syncope  Respiratory: Negative for cough, shortness of breath and wheezing  Gastrointestinal: Negative for abdominal pain, nausea and vomiting  Genitourinary: Negative for dysuria  Neurological: Negative for dizziness and light-headedness  Psychiatric/Behavioral: Negative for altered mental status  All other systems reviewed and are negative        Historical Information   Past Medical History:   Diagnosis Date    Cardiac disease     Disease of thyroid gland     Hematuria     Hyperlipidemia     Hypertension     Renal calculi     UTI (urinary tract infection)      Past Surgical History:   Procedure Laterality Date    CARDIAC SURGERY      CORONARY ANGIOPLASTY WITH STENT PLACEMENT      CORONARY ARTERY BYPASS GRAFT      HERNIA REPAIR       Social History     Substance and Sexual Activity   Alcohol Use No    Comment: socially     Social History     Substance and Sexual Activity   Drug Use No     Social History     Tobacco Use   Smoking Status Former Smoker    Types: Cigarettes    Quit date: 1    Years since quittin 5   Smokeless Tobacco Never Used     Social History     Socioeconomic History    Marital status: Single     Spouse name: Not on file    Number of children: Not on file    Years of education: Not on file   Holly Cabral Highest education level: Not on file   Occupational History    Not on file   Tobacco Use    Smoking status: Former Smoker     Types: Cigarettes     Quit date:      Years since quittin 5    Smokeless tobacco: Never Used   Substance and Sexual Activity    Alcohol use: No     Comment: socially    Drug use: No    Sexual activity: Not on file   Other Topics Concern    Not on file   Social History Narrative    Not on file     Social Determinants of Health     Financial Resource Strain:     Difficulty of Paying Living Expenses:    Food Insecurity:     Worried About Running Out of Food in the Last Year:     [address] in the Last Year:    Transportation Needs:     Lack of Transportation (Medical):      Lack of Transportation (Non-Medical):    Physical Activity:     Days of Exercise per Week:     Minutes of Exercise per Session:    Stress:     Feeling of Stress :    Social Connections:     Frequency of Communication with Friends and Family:     Frequency of Social Gatherings with Friends and Family:     Attends Spiritism Services:     Active Member of Clubs or Organizations:     Attends Club or Organization Meetings:     Marital Status:    Intimate Partner Violence:     Fear of Current or Ex-Partner:     Emotionally Abused:     Physically Abused:     Sexually Abused:      Family History:  Family History   Problem Relation Age of Onset    Heart disease Mother     Hypertension Mother     Lung cancer Father         smoker     Meds/Allergies      Current Facility-Administered Medications   Medication Dose Route Frequency    [START ON 2021] aspirin chewable tablet 81 mg  81 mg Oral Daily    atorvastatin (LIPITOR) tablet 80 mg  80 mg Oral Daily    ezetimibe (ZETIA) tablet 10 mg  10 mg Oral Daily    heparin (porcine) subcutaneous injection 5,000 Units  5,000 Units Subcutaneous Q8H [address]    levothyroxine tablet 75 mcg  75 mcg Oral Daily    metoprolol tartrate (LOPRESSOR) tablet 25 mg  25 mg Oral Q12H [address]    ticagrelor (BRILINTA) tablet 90 mg  90 mg Oral Q12H [address]      Prior to Admission Medications   Prescriptions Last Dose Informant Patient Reported? Taking? Cranberry 500 MG CAPS Unknown at Unknown time  Yes No   Sig: Take 500 mg by mouth daily   EPINEPHrine (EPIPEN) 0 3 mg/0 3 mL SOAJ   No No   Sig: Inject 0 3 mL (0 3 mg total) into a muscle once for 1 dose   Multiple Vitamin (MULTIVITAMIN) capsule Unknown at Unknown time  Yes No   Sig: Take 1 capsule by mouth daily   Omega-3 Fatty Acids (FISH OIL) 1,000 mg Unknown at Unknown time  Yes No   Sig: Take 1,000 mg by mouth daily   aspirin 81 mg chewable tablet 7/15/2021 at Unknown time  Yes Yes   Sig: Chew 81 mg daily   atorvastatin (LIPITOR) 80 mg tablet 7/16/2021 at Unknown time  Yes Yes   Sig: Take 80 mg by mouth daily   co-enzyme Q-10 30 MG capsule Unknown at Unknown time  Yes No   Sig: Take 100 mg by mouth daily   diphenhydrAMINE (BENADRYL) 25 mg tablet Not Taking at Unknown time  No No   Sig: Take 2 tablets (50 mg total) by mouth every 8 (eight) hours as needed for itching   Patient not taking: Reported on 8/12/2019   ezetimibe (ZETIA) 10 mg tablet Unknown at Unknown time  Yes No   Sig: Take 10 mg by mouth daily   levothyroxine 25 mcg tablet Unknown at Unknown time  Yes No   Sig: Take 75 mcg by mouth daily    metoprolol tartrate (LOPRESSOR) 25 mg tablet Unknown at Unknown time  Yes No   Sig: Take 25 mg by mouth every 12 (twelve) hours      Facility-Administered Medications: None        No Known Allergies    Objective   Vitals: Blood pressure 132/80, pulse 72, temperature 97 8 °F (36 6 °C), temperature source Oral, resp  rate 17, weight 70 5 kg (155 lb 6 8 oz), SpO2 97 %      Systolic (44JIE), BHB:509 , Min:131 , CVP:406     Diastolic (39OKR), FLW:60, Min:73, Max:82      Intake/Output Summary (Last 24 hours) at 7/16/2021 1103  Last data filed at 7/16/2021 0918  Gross per 24 hour   Intake 250 ml   Output --   Net 250 ml     Invasive Devices Peripheral Intravenous Line            Peripheral IV 07/16/21 Left Forearm <1 day              Weight (last 2 days)     Date/Time   Weight    07/16/21 09:21:46   70 5 (155 42)              Physical Exam  Vitals reviewed  Constitutional:       General: He is not in acute distress  Appearance: Normal appearance  He is not ill-appearing  HENT:      Head: Normocephalic  Mouth/Throat:      Mouth: Mucous membranes are moist    Cardiovascular:      Rate and Rhythm: Normal rate and regular rhythm  Heart sounds: S2 normal  Murmur heard  Systolic murmur is present with a grade of 2/6  Comments: Right femoral cath site wnl; no swelling bleeding or hematoma  Pulmonary:      Effort: Pulmonary effort is normal       Breath sounds: Normal breath sounds  No wheezing or rales  Abdominal:      General: Abdomen is flat  Palpations: Abdomen is soft  Musculoskeletal:      Right lower leg: No edema  Left lower leg: No edema  Skin:     General: Skin is warm  Neurological:      Mental Status: He is alert and oriented to person, place, and time     Psychiatric:         Mood and Affect: Mood normal        LABORATORY RESULTS:  Results from last 7 days   Lab Units 07/16/21  0941   TROPONIN I ng/mL 0 45*     CBC with diff:   Results from last 7 days   Lab Units 07/16/21  0941   WBC Thousand/uL 8 14   HEMOGLOBIN g/dL 16 0   HEMATOCRIT % 46 4   MCV fL 95   PLATELETS Thousands/uL 228   MCH pg 32 7   MCHC g/dL 34 5   RDW % 12 5   MPV fL 10 3   NRBC AUTO /100 WBCs 0     CMP:  Results from last 7 days   Lab Units 07/16/21  0941   POTASSIUM mmol/L 3 6   CHLORIDE mmol/L 102   CO2 mmol/L 25   BUN mg/dL 12   CREATININE mg/dL 1 06   CALCIUM mg/dL 9 6   AST U/L 26   ALT U/L 27   ALK PHOS U/L 112   EGFR ml/min/1 73sq m 67     BMP:  Results from last 7 days   Lab Units 07/16/21  0941   POTASSIUM mmol/L 3 6   CHLORIDE mmol/L 102   CO2 mmol/L 25   BUN mg/dL 12   CREATININE mg/dL 1 06   CALCIUM mg/dL 9 6     Lab Results   Component Value Date    NTBNP 180 2021      Results from last 7 days   Lab Units 21  0941   PTT seconds 34   INR  1 00     Results from last 7 days   Lab Units 21  0941   INR  1 00     Lipid Profile:   Lab Results   Component Value Date    CHOL 174 2014    CHOL 182 2014     Lab Results   Component Value Date    HDL 51 2019    HDL 50 2014    HDL 45 2014     Lab Results   Component Value Date    LDLCALC 88 2019    LDLCALC 104 (H) 2014    LDLCALC 109 (H) 2014     Lab Results   Component Value Date    TRIG 63 2019    TRIG 98 2014    TRIG 140 2014     Cardiac Testing:   No results found for this or any previous visit  No valid procedures specified  No results found for this or any previous visit  Cardiac catheterization    Result Date: 2021  Narrative: Dk 175 300 [address] [phone] [facility] Complete Report Patient: Bernarda Matthews MR number: [id number] Account number: [de-identified] Study date: 2021 Gender: Male : 1945 Height: 68 1 in Weight: 155 1 lb BSA: 1 84 mï¾² Allergies: NO KNOWN ALLERGIES Diagnostic Cardiologist:  Trinity Campos MD Interventional Cardiologist:  Trinity Campos MD Primary Physician:  Vera Clemente MD SUMMARY CORONARY CIRCULATION: Proximal LAD: There was a 100 % stenosis  This lesion is a chronic total occlusion  Proximal circumflex: There was a tubular 75 % stenosis at the site of a prior stent  An intervention was performed  1st obtuse marginal: There was a 99 % stenosis at the site of a prior stent  The lesion was associated with a large filling defect consistent with thrombus  There was DANIEL grade 2 flow through the vessel (partial perfusion)  This lesion is a likely culprit for the patient's recent myocardial infarction  An intervention was performed   Proximal RCA: There was a tubular 70 % stenosis at the site of a prior stent  It appears amenable to percutaneous intervention  Graft to the 1st obtuse marginal: There was a 100 % stenosis at the proximal anastomosis  It does not appear amenable to intervention  Graft to the distal RCA: There was a 100 % stenosis at the proximal anastomosis  It does not appear amenable to intervention  HEMODYNAMICS: Hemodynamic assessment demonstrated mildly elevated LVEDP  1ST LESION INTERVENTIONS: A successful drug-eluting stent with thrombectomy and balloon angioplasty procedure was performed on the 99 % lesion in the 1st obtuse marginal  Following intervention there was an excellent angiographic appearance with a 0 % residual stenosis  A Xience Anisa Rx 4 0 x 15mm drug-eluting stent was placed across the lesion and deployed at a maximum inflation pressure of 20 marcos  2ND LESION INTERVENTIONS: A successful balloon angioplasty procedure was performed on the 75 % lesion in the proximal circumflex  Following intervention there was an excellent angiographic appearance with a 10 % residual stenosis  PROCEDURES PERFORMED --  Left heart catheterization with ventriculography  --  Left coronary angiography  --  Right coronary angiography  --  LIMA graft angiography  --  Arch aortography with cardiac catheterization  --  Diagnostic intravascular ultrasound (IVUS)  --  Acute Myocardial Infarct  --  Mod Sedation Same Physician Initial 15min  --  IVUS  --  Coronary Catheterization (w/ LHC, w/ Grafts)  --  AMI PCI (HARIKA, PTCRA, PTCA) Single  --  Intervention on OM1: drug-eluting stent, thrombectomy, balloon angioplasty  --  Intervention on proximal circumflex: balloon angioplasty  PROCEDURE: The risks and alternatives of the procedures and conscious sedation were explained to the patient and informed consent was obtained  The patient was brought to the cath lab and placed on the table  The planned puncture sites were prepped and draped in the usual sterile fashion   --  Right femoral artery access  The puncture site was infiltrated with local anesthetic  The vessel was accessed using the modified Seldinger technique, a wire was advanced into the vessel, and a sheath was advanced over the wire into the vessel  --  Left heart catheterization with ventriculography  A catheter was advanced over a guidewire into the ascending aorta  After recording ascending aortic pressure, the catheter was advanced across the aortic valve and left ventricular pressure was recorded  Ventriculography was performed  The catheter was pulled back across the aortic valve and into the ascending aorta and pullback pressures were obtained  --  Left coronary artery angiography  A catheter was advanced over a guidewire into the aorta and positioned in the left coronary artery ostium under fluoroscopic guidance  Angiography was performed  --  Right coronary artery angiography  A catheter was advanced over a guidewire into the aorta and positioned in the right coronary artery ostium under fluoroscopic guidance  Angiography was performed  --  Left internal mammary graft angiography  A catheter was advanced to the aorta and positioned in the left subclavian artery over a guidewire under fluoroscopic guidance  Angiography was performed  --  Arch aortography with cardiac catheterization  A catheter was positioned into the ascending aorta over a guide wire under fluoroscopic guidance and contrast was injected  Angiography was performed  --  Diagnostic intravascular ultrasound (IVUS) was performed  A(n) IVUS catheter was advanced over the wire into the vessel and IVUS imaging was performed  --  Acute Myocardial Infarct  --  Mod Sedation Same Physician Initial 15min  --  IVUS  --  Coronary Catheterization (w/ LHC, w/ Grafts)   LESION #1 INTERVENTION: A successful drug-eluting stent with thrombectomy and balloon angioplasty procedure was performed on the 99 % lesion in the 1st obtuse marginal  Following intervention there was an excellent angiographic appearance with a 0 % residual stenosis  This was an ACC/AHA type B "moderate risk" lesion for intervention  The residual lesion demonstrated no evidence of thrombus  There was DANIEL 2 flow before the procedure and DANIEL 3 flow after the procedure  There was no dissection  --  Vessel setup was performed  A 6Fr  Launcher Ebu 3 75 guiding catheter was used to cannulate the vessel  --  Vessel setup was performed  A Runthrough NS 180cm wire was used to cross the lesion  --  Balloon angioplasty was performed, using a Trek Rx 2 5 x 15mm balloon, with 1 inflations and a maximum inflation pressure of 16 marcos  --  Mechanical ( 6Fr  Mack AP) thrombectomy was performed, with max duration 31 sec, max volume out 27 ml, and 2 attempt(s)  --  Balloon angioplasty was performed, using a Trek Rx 4 0 x 15mm balloon, with 2 inflations and a maximum inflation pressure of 14 marcos  --  A Xience Anisa Rx 4 0 x 15mm drug-eluting stent was placed across the lesion and deployed at a maximum inflation pressure of 20 marcos  LESION #2 INTERVENTION: A successful balloon angioplasty procedure was performed on the 75 % lesion in the proximal circumflex  Following intervention there was an excellent angiographic appearance with a 10 % residual stenosis  There was DANIEL 3 flow before the procedure and DANIEL 3 flow after the procedure  There was no dissection  --  Vessel setup was performed  A 6Fr  Launcher Ebu 3 75 guiding catheter was used to cannulate the vessel  --  Vessel setup was performed  A Runthrough NS 180cm wire was used to cross the lesion  --  Balloon angioplasty was performed, using a Trek Rx 2 5 x 15mm balloon, with 1 inflations and a maximum inflation pressure of 18 marcos  --  Balloon angioplasty was performed, using a Trek Rx 4 0 x 15mm balloon, with 2 inflations and a maximum inflation pressure of 16 marcos  INTERVENTIONS: --  AMI PCI (HARIKA, PTCRA, PTCA) Single   PROCEDURE COMPLETION: The patient tolerated the procedure well and was discharged from the cath lab  TIMING: Test started at 09:41  Test concluded at 10:48  HEMOSTASIS: The sheath was removed over a wire and the Angioseal delivery sheath was inserted into the femoral artery  Hemostasis was obtained using a closure device ( Angioseal) deployed through the delivery sheath  MEDICATIONS GIVEN: Versed (2mg/2ml), 2 mg, IV, at 09:42  Fentanyl (1OOmcg/2 ml), 50 mcg, IV, at 09:43  1% Lidocaine, 10 ml, subcutaneously, at 09:43  Heparin 1000 units/ml, 3,000 units, IV, at 09:58  Nitroglycerine (200mcg/ml), 200 mcg, at 10:02  Verapamil (5mg/2ml), 100 mcg, at 10:11  Verapamil (5mg/2ml), 100 mcg, at 10:14  Verapamil (5mg/2ml), 100 mcg, at 10:20  Nitroglycerine (200mcg/ml), 200 mcg, at 10:27  Fentanyl (1OOmcg/2 ml), 50 mcg, IV, at 10:48  CONTRAST GIVEN: 100 ml Omnipaque (350mg I /ml)  110 ml Omnipaque (350mg I /ml)  RADIATION EXPOSURE: Fluoroscopy time: 15 22 min  HEMODYNAMICS: Hemodynamic assessment demonstrated mildly elevated LVEDP  VALVES: AORTIC VALVE:   --  There was no aortic stenosis  There was no significant aortic insufficiency  CORONARY VESSELS:   --  The coronary circulation is co-dominant  --  Left main: The vessel was large sized  Angiography showed minor luminal irregularities  --  LAD: The vessel was medium sized  The artery was supplied by a patent bypass graft  --  Proximal LAD: There was a 100 % stenosis  This lesion is a chronic total occlusion  --  Proximal circumflex: There was a tubular 75 % stenosis at the site of a prior stent  An intervention was performed  --  1st obtuse marginal: The vessel was large sized  The artery was supplied by an occluded bypass graft  There was a 99 % stenosis at the site of a prior stent  The lesion was associated with a large filling defect consistent with thrombus  There was DANIEL grade 2 flow through the vessel (partial perfusion)  This lesion is a likely culprit for the patient's recent myocardial infarction   An intervention was performed  --  Proximal RCA: There was a tubular 70 % stenosis at the site of a prior stent  It appears amenable to percutaneous intervention  --  Graft to the 1st obtuse marginal: The graft was a saphenous vein graft from the aorta  There was a 100 % stenosis at the proximal anastomosis  It does not appear amenable to intervention  --  Graft to the distal RCA: The graft was a saphenous vein graft from the aorta  There was a 100 % stenosis at the proximal anastomosis  It does not appear amenable to intervention  AORTA:   --  The aortic root and ascending aorta were satisfactorily visualized  There was no atheroma  There was no evidence for dissection  RECOMMENDATIONS Patient management should include increasing lipid lowering therapy  Patient management to include dual antiplatelet therapy  DISPOSITION: The patient left the catheterization laboratory in stable condition  Prepared and signed by Pedro Zamora MD Signed 07/16/2021 10:59:08 Study diagram Angiographic findings Native coronary lesions: ï¾·Proximal LAD: Lesion 1: 100 % stenosis  ï¾·Proximal circumflex: Lesion 1: tubular, 75 % stenosis, site of prior stent  ï¾·OM1: Lesion 1: 99 % stenosis, site of prior stent  ï¾·Proximal RCA: Lesion 1: tubular, 70 % stenosis, site of prior stent  Coronary graft lesions: ï¾·Graft to OM1: SVG  ï¾· 100 % stenosis at proximal anastomosis  ï¾·Graft to distal RCA: SVG  ï¾· 100 % stenosis at proximal anastomosis  Intervention results Native coronary lesions: ï¾·Successful drug-eluting stent, thrombectomy, and balloon angioplasty of the 99 % stenosis in OM1  Appearance excellent with 0 % residual stenosis  Stent: Santi Pancoast Rx 4 0 x 15mm drug-eluting  ï¾·Successful balloon angioplasty of the 75 % stenosis in proximal circumflex  Appearance excellent with 10 % residual stenosis   Hemodynamic tables Pressures:  Baseline Pressures:  - HR: 62 Pressures:  - Rhythm: Pressures:  -- Aortic Pressure (S/D/M): 118/57/51 Pressures:  -- Left Ventricle (s/edp): 109/20/-- Outputs:  Baseline Outputs:  -- CALCULATIONS[age] Outputs:  -- CALCULATIONS: Body Surface Area: 1 84 Outputs:  -- CALCULATIONS: Height in cm: 173 00 Outputs:  -- CALCULATIONS: Sex: Male Outputs:  -- CALCULATIONS: Weight in k 50       Assessment/Plan      1  STEMI: s/p stenting to Circumflex and OM1  Pt remains chest pain free post cath  Will need DAPT for at least one year with ASA 81mg daily and Brilinta 90mg BID (consult CM to check cost)  Continue high intensity statin; low dose beta blocker with hold parameters  Check echocardiogram, lipid panel, Hgb A1c, TSH; BMP and CBC in AM    2  HTN: 136/82  3  Systolic murmur: no prior echo; checking this admission  4  Hypothyroidism: checking TSH; continue home synthroid dose  VTE Prophylaxis: Heparin  Expected length of stay:   greater than 2 midnights and less than 2 midnights     Code Status: Level 1 - Full Code  Advance Directive and Living Will:      Power of :    POLST:      Counseling / Coordination of Care  Total floor / unit time spent today 60 minutes  Greater than 50% of total time was spent with the patient and / or family counseling and / or coordination of care  NACHO Velasco    ** Please Note: Dragon 360 Dictation voice to text software may have been used in the creation of this document   **

## 2021-07-16 NOTE — QUICK NOTE
69-year-old male with CAD status post remote CABG and prior PCI, presented with acute ST-elevation myocardial infarction  Found to have critical stenosis with thrombus in the OM 1  Underwent PCI of the proximal circumflex and OM1 with drug-eluting stents  Cardiac catheterization images reviewed  Patient evaluated in the ICU  Cardiac catheterization results discussed with the patient  He is now chest pain-free  Cardiac cath site stable  Telemetry shows sinus rhythm  Echo to evaluate LV function  Continue current medical therapy and supportive care

## 2021-07-16 NOTE — CONSULTS
[address] [facility] 1945, 67 y o  male MRN: [id number]  Unit/Bed#: [facility] 418-46 Encounter: [id number]  Primary Care Provider: Joaquim Antonio MD   Date and time admitted to hospital: 7/16/2021  9:18 AM    Inpatient consult to Thomas Dodd Jeff performed by: Tracy Elmore PA-C  Consult ordered by: NACHO Doherty          * STEMI (ST elevation myocardial infarction) [facility])  Assessment & Plan  · Inferior STEMI  · Taken to cath lab  of LAD with collateralization, PCI with HARIKA to proximal circ and OM1 (felt to be culprit lesion)  · Tele monitoring for re-perfusion arrhythmia  · ASA, statin, zetia, lopressor 12 5  · ECHO pending   · R femoral access, small hematoma, monitor site  · Will check lipid panel, a1c    Hx of CABG  Assessment & Plan  · Remote CABG in 1992, had PCI in 2005    Disease of thyroid gland  Assessment & Plan  · levothyroxine      -------------------------------------------------------------------------------------------------------------  Chief Complaint: chest pain, malaise, STEMI    History of Present Illness   HX and PE limited by: frances  Stu Choi is a 67 y o  male w/ PMHx hypothyroidism and CAD s/p remote CABG and subsequent PCI who now presents with STEMI  Patient has been feeling fatigued over the last 48hrs, this AM while eating breakfast at a dinner he developed chest pain prompting EMS activation  Found to have inferior STEMI, taken emergently to cath lab  Found to have multiple areas of ischemia including  of his LAD  HARIKA was placed in proximal circ and OM1 (likely culprit lesion)  The patient now presents to the ICU post procedure, he denies chest pain  History obtained from chart review and the patient   -------------------------------------------------------------------------------------------------------------  Dispo:  Admit to Critical Care     Code Status: Level 1 - Full Code  --------------------------------------------------------------------------------------------------------------  Review of Systems   Constitutional: Positive for fatigue  Respiratory: Negative  Cardiovascular: Negative  A 12-point, complete review of systems was reviewed and negative except as stated above     Physical Exam  HENT:      Head: Normocephalic  Mouth/Throat:      Mouth: Mucous membranes are moist    Eyes:      Pupils: Pupils are equal, round, and reactive to light  Cardiovascular:      Rate and Rhythm: Normal rate and regular rhythm  Abdominal:      General: Abdomen is flat  Palpations: Abdomen is soft  Musculoskeletal:      Cervical back: Normal range of motion and neck supple  Comments: R groin puncture site with scant blood   Skin:     General: Skin is warm  Capillary Refill: Capillary refill takes less than 2 seconds  Neurological:      General: No focal deficit present          --------------------------------------------------------------------------------------------------------------  Vitals:   Vitals:    07/16/21 1200 07/16/21 1230 07/16/21 1300 07/16/21 1400   BP: 106/66 101/57 118/65 102/56   BP Location: Left arm Left arm Left arm Left arm   Pulse: 56 (!) 52 72 (!) 54   Resp: 15 12 19 14   Temp:       TempSrc:       SpO2: 97% 95% 99% 97%   Weight:       Height:         Temp  Min: 97 8 °F (36 6 °C)  Max: 97 8 °F (36 6 °C)  IBW (Ideal Body Weight): 68 4 kg  Height: 5' 8" (172 7 cm)  Body mass index is 22 66 kg/m²      Laboratory and Diagnostics:  Results from last 7 days   Lab Units 07/16/21  1208 07/16/21  0941   WBC Thousand/uL  --  8 14   HEMOGLOBIN g/dL  --  16 0   HEMATOCRIT %  --  46 4   PLATELETS Thousands/uL 199 228   NEUTROS PCT %  --  43   MONOS PCT %  --  8     Results from last 7 days   Lab Units 07/16/21  0941   SODIUM mmol/L 136   POTASSIUM mmol/L 3 6   CHLORIDE mmol/L 102   CO2 mmol/L 25   ANION GAP mmol/L 9   BUN mg/dL 12   CREATININE mg/dL 1 06   CALCIUM mg/dL 9 6   GLUCOSE RANDOM mg/dL 248*   ALT U/L 27   AST U/L 26   ALK PHOS U/L 112   ALBUMIN g/dL 3 8   TOTAL BILIRUBIN mg/dL 0 90          Results from last 7 days   Lab Units 21  0941   INR  1 00   PTT seconds 34      Results from last 7 days   Lab Units 21  1208 21  0941   TROPONIN I ng/mL 3 32* 0 45*         ABG:    VBG:          Micro:        EKG: NSR        Historical Information   Past Medical History:   Diagnosis Date    Cardiac disease     Disease of thyroid gland     Hematuria     Hyperlipidemia     Hypertension     Renal calculi     UTI (urinary tract infection)      Past Surgical History:   Procedure Laterality Date    CARDIAC SURGERY      CORONARY ANGIOPLASTY WITH STENT PLACEMENT      CORONARY ARTERY BYPASS GRAFT      HERNIA REPAIR       Social History   Social History     Substance and Sexual Activity   Alcohol Use No    Comment: socially     Social History     Substance and Sexual Activity   Drug Use No     Social History     Tobacco Use   Smoking Status Former Smoker    Types: Cigarettes    Quit date: 1    Years since quittin 5   Smokeless Tobacco Never Used     Exercise History: unknown  Family History:   Family History   Problem Relation Age of Onset    Heart disease Mother     Hypertension Mother     Lung cancer Father         smoker     I have reviewed this patient's family history and commented on sigificant items within the HPI      Medications:  Current Facility-Administered Medications   Medication Dose Route Frequency    acetaminophen (TYLENOL) tablet 650 mg  650 mg Oral Q6H PRN    [START ON 2021] aspirin chewable tablet 81 mg  81 mg Oral Daily    atorvastatin (LIPITOR) tablet 80 mg  80 mg Oral Daily    ezetimibe (ZETIA) tablet 10 mg  10 mg Oral Daily    heparin (porcine) subcutaneous injection 5,000 Units  5,000 Units Subcutaneous Q8H [address]    [START ON 2021] levothyroxine tablet 75 mcg  75 mcg Oral Early Morning  metoprolol tartrate (LOPRESSOR) partial tablet 12 5 mg  12 5 mg Oral Q12H [address]    nitroglycerin (NITROSTAT) SL tablet 0 4 mg  0 4 mg Sublingual Q5 Min PRN    sodium chloride 0 9 % infusion  100 mL/hr Intravenous Continuous    ticagrelor (BRILINTA) tablet 90 mg  90 mg Oral Q12H [address]     Home medications:  Prior to Admission Medications   Prescriptions Last Dose Informant Patient Reported? Taking?    Cranberry 500 MG CAPS Unknown at Unknown time  Yes No   Sig: Take 500 mg by mouth daily   EPINEPHrine (EPIPEN) 0 3 mg/0 3 mL SOAJ   No No   Sig: Inject 0 3 mL (0 3 mg total) into a muscle once for 1 dose   Multiple Vitamin (MULTIVITAMIN) capsule Unknown at Unknown time  Yes No   Sig: Take 1 capsule by mouth daily   Omega-3 Fatty Acids (FISH OIL) 1,000 mg Unknown at Unknown time  Yes No   Sig: Take 1,000 mg by mouth daily   aspirin 81 mg chewable tablet 7/15/2021 at Unknown time  Yes Yes   Sig: Chew 81 mg daily   atorvastatin (LIPITOR) 80 mg tablet 7/16/2021 at Unknown time  Yes Yes   Sig: Take 80 mg by mouth daily   co-enzyme Q-10 30 MG capsule Unknown at Unknown time  Yes No   Sig: Take 100 mg by mouth daily   diphenhydrAMINE (BENADRYL) 25 mg tablet Not Taking at Unknown time  No No   Sig: Take 2 tablets (50 mg total) by mouth every 8 (eight) hours as needed for itching   Patient not taking: Reported on 8/12/2019   ezetimibe (ZETIA) 10 mg tablet Unknown at Unknown time  Yes No   Sig: Take 10 mg by mouth daily   levothyroxine 25 mcg tablet Unknown at Unknown time  Yes No   Sig: Take 75 mcg by mouth daily    metoprolol tartrate (LOPRESSOR) 25 mg tablet Unknown at Unknown time  Yes No   Sig: Take 25 mg by mouth every 12 (twelve) hours      Facility-Administered Medications: None     Allergies:  No Known Allergies  ------------------------------------------------------------------------------------------------------------  Advance Directive and Living Will:      Power of :    POLST: ------------------------------------------------------------------------------------------------------------  Care Time Delivered:   No Critical Care time spent       Julianne Carlson PA-C        Portions of the record may have been created with voice recognition software  Occasional wrong word or "sound a like" substitutions may have occurred due to the inherent limitations of voice recognition software    Read the chart carefully and recognize, using context, where substitutions have occurred

## 2021-07-16 NOTE — ED ATTENDING ATTESTATION
7/16/2021  IMariely MD, saw and evaluated the patient  I have discussed the patient with the resident/non-physician practitioner and agree with the resident's/non-physician practitioner's findings, Plan of Care, and MDM as documented in the resident's/non-physician practitioner's note, except where noted  All available labs and Radiology studies were reviewed  I was present for key portions of any procedure(s) performed by the resident/non-physician practitioner and I was immediately available to provide assistance  At this point I agree with the current assessment done in the Emergency Department  I have conducted an independent evaluation of this patient a history and physical is as follows:  Pt was at breakfast and started with onset of chest pain  No sob no nausea Pt has long time cardiac history  Pt was called in by ems They transmitted 2 ekgs the first ahd minimal elevation inferiorlywith  Lateral t wave inversion the second 8 minutes later had significant elevation infero laterally  Cardiology was contacted prehospital and mi alert called On arrival pt had received 3 sl ntg with some resolution of pain EKG in hospital still with small st elevation inferiorly and lateral depression PE: alert heart reg lungs clear abd soft nontender ext nad MDM: Pt given anticoagulants and sent cath lab  ED Course         Critical Care Time  CriticalCare Time  Performed by: Mariely Chamberlain MD  Authorized by:  Mariely Chamberlain MD     Critical care provider statement:     Critical care time (minutes):  33    Critical care time was exclusive of:  Separately billable procedures and treating other patients and teaching time    Critical care was necessary to treat or prevent imminent or life-threatening deterioration of the following conditions:  Cardiac failure    Critical care was time spent personally by me on the following activities:  Obtaining history from patient or surrogate, evaluation of patient's response to treatment, examination of patient, ordering and performing treatments and interventions and re-evaluation of patient's condition    I assumed direction of critical care for this patient from another provider in my specialty: no

## 2021-07-16 NOTE — PROGRESS NOTES
Pastoral Care Progress Note    2021  Patient: Yan Richardson : 1945  Admission Date & Time: 2021 9031  MRN: [id number] CSN: [id number]       responded to MI alert  No family present at this time and patient appears to be calm  Patient being transported to cardiac catheterization         21 0900   Clinical Encounter Type   Visited With Patient not available   Crisis Visit Code;ED

## 2021-07-17 PROBLEM — E11.9 TYPE 2 DIABETES MELLITUS (HCC): Status: ACTIVE | Noted: 2021-07-17

## 2021-07-17 PROBLEM — I35.0 SEVERE AORTIC STENOSIS: Status: ACTIVE | Noted: 2021-07-17

## 2021-07-17 LAB
ANION GAP SERPL CALCULATED.3IONS-SCNC: 4 MMOL/L (ref 4–13)
BASOPHILS # BLD AUTO: 0.05 THOUSANDS/ΜL (ref 0–0.1)
BASOPHILS NFR BLD AUTO: 1 % (ref 0–1)
BUN SERPL-MCNC: 8 MG/DL (ref 5–25)
CALCIUM SERPL-MCNC: 8.9 MG/DL (ref 8.3–10.1)
CHLORIDE SERPL-SCNC: 109 MMOL/L (ref 100–108)
CO2 SERPL-SCNC: 27 MMOL/L (ref 21–32)
CREAT SERPL-MCNC: 0.78 MG/DL (ref 0.6–1.3)
EOSINOPHIL # BLD AUTO: 0.24 THOUSAND/ΜL (ref 0–0.61)
EOSINOPHIL NFR BLD AUTO: 3 % (ref 0–6)
ERYTHROCYTE [DISTWIDTH] IN BLOOD BY AUTOMATED COUNT: 12.4 % (ref 11.6–15.1)
GFR SERPL CREATININE-BSD FRML MDRD: 87 ML/MIN/1.73SQ M
GLUCOSE SERPL-MCNC: 118 MG/DL (ref 65–140)
GLUCOSE SERPL-MCNC: 124 MG/DL (ref 65–140)
GLUCOSE SERPL-MCNC: 138 MG/DL (ref 65–140)
GLUCOSE SERPL-MCNC: 190 MG/DL (ref 65–140)
GLUCOSE SERPL-MCNC: 98 MG/DL (ref 65–140)
HCT VFR BLD AUTO: 41.7 % (ref 36.5–49.3)
HGB BLD-MCNC: 14.1 G/DL (ref 12–17)
IMM GRANULOCYTES # BLD AUTO: 0.02 THOUSAND/UL (ref 0–0.2)
IMM GRANULOCYTES NFR BLD AUTO: 0 % (ref 0–2)
LYMPHOCYTES # BLD AUTO: 2.37 THOUSANDS/ΜL (ref 0.6–4.47)
LYMPHOCYTES NFR BLD AUTO: 27 % (ref 14–44)
MCH RBC QN AUTO: 31.9 PG (ref 26.8–34.3)
MCHC RBC AUTO-ENTMCNC: 33.8 G/DL (ref 31.4–37.4)
MCV RBC AUTO: 94 FL (ref 82–98)
MONOCYTES # BLD AUTO: 0.73 THOUSAND/ΜL (ref 0.17–1.22)
MONOCYTES NFR BLD AUTO: 8 % (ref 4–12)
NEUTROPHILS # BLD AUTO: 5.4 THOUSANDS/ΜL (ref 1.85–7.62)
NEUTS SEG NFR BLD AUTO: 61 % (ref 43–75)
NRBC BLD AUTO-RTO: 0 /100 WBCS
PLATELET # BLD AUTO: 181 THOUSANDS/UL (ref 149–390)
PMV BLD AUTO: 9.9 FL (ref 8.9–12.7)
POTASSIUM SERPL-SCNC: 3.7 MMOL/L (ref 3.5–5.3)
RBC # BLD AUTO: 4.42 MILLION/UL (ref 3.88–5.62)
SODIUM SERPL-SCNC: 140 MMOL/L (ref 136–145)
WBC # BLD AUTO: 8.81 THOUSAND/UL (ref 4.31–10.16)

## 2021-07-17 PROCEDURE — 85025 COMPLETE CBC W/AUTO DIFF WBC: CPT | Performed by: NURSE PRACTITIONER

## 2021-07-17 PROCEDURE — 99232 SBSQ HOSP IP/OBS MODERATE 35: CPT | Performed by: PHYSICIAN ASSISTANT

## 2021-07-17 PROCEDURE — 99233 SBSQ HOSP IP/OBS HIGH 50: CPT | Performed by: INTERNAL MEDICINE

## 2021-07-17 PROCEDURE — 82948 REAGENT STRIP/BLOOD GLUCOSE: CPT

## 2021-07-17 PROCEDURE — 80048 BASIC METABOLIC PNL TOTAL CA: CPT | Performed by: NURSE PRACTITIONER

## 2021-07-17 RX ORDER — LOSARTAN POTASSIUM 25 MG/1
25 TABLET ORAL DAILY
Status: DISCONTINUED | OUTPATIENT
Start: 2021-07-17 | End: 2021-07-18 | Stop reason: HOSPADM

## 2021-07-17 RX ADMIN — HEPARIN SODIUM 5000 UNITS: 5000 INJECTION INTRAVENOUS; SUBCUTANEOUS at 21:21

## 2021-07-17 RX ADMIN — TICAGRELOR 90 MG: 90 TABLET ORAL at 21:21

## 2021-07-17 RX ADMIN — TICAGRELOR 90 MG: 90 TABLET ORAL at 09:00

## 2021-07-17 RX ADMIN — HEPARIN SODIUM 5000 UNITS: 5000 INJECTION INTRAVENOUS; SUBCUTANEOUS at 14:38

## 2021-07-17 RX ADMIN — Medication 12.5 MG: at 09:00

## 2021-07-17 RX ADMIN — Medication 12.5 MG: at 21:21

## 2021-07-17 RX ADMIN — EZETIMIBE 10 MG: 10 TABLET ORAL at 09:02

## 2021-07-17 RX ADMIN — ASPIRIN 81 MG CHEWABLE TABLET 81 MG: 81 TABLET CHEWABLE at 09:00

## 2021-07-17 RX ADMIN — ATORVASTATIN CALCIUM 80 MG: 80 TABLET, FILM COATED ORAL at 09:00

## 2021-07-17 RX ADMIN — INSULIN LISPRO 1 UNITS: 100 INJECTION, SOLUTION INTRAVENOUS; SUBCUTANEOUS at 17:53

## 2021-07-17 RX ADMIN — HEPARIN SODIUM 5000 UNITS: 5000 INJECTION INTRAVENOUS; SUBCUTANEOUS at 05:16

## 2021-07-17 RX ADMIN — LEVOTHYROXINE SODIUM 75 MCG: 75 TABLET ORAL at 05:16

## 2021-07-17 NOTE — PROGRESS NOTES
Cardiology Progress Note - Jeanette Martin 67 y o  male MRN: [id number]    Unit/Bed#: [facility] 513-01 Encounter: [id number]        ASSESSMENT/PLAN:    Principal Problem:    STEMI (ST elevation myocardial infarction) [facility])  Active Problems:    Left arm pain    Disease of thyroid gland    Hypertension    Hx of CABG    Severe aortic stenosis    Type 2 diabetes mellitus ([address] )    CAD status post CABG:  Acute ST-elevation MI:  Status post PCI of the OM1 and proximal circumflex with drug-eluting stents  No further angina  Moderate to severe aortic stenosis:  No clinical heart failure  Will need outpatient follow up regarding progression of aortic stenosis  Cardiomyopathy:  Overall LV systolic function preserved  Moderate mitral regurgitation and moderate to severe AS noted  Echo personally reviewed and findings discussed with the patient  Hypertension:  Blood pressure elevated this AM  Add low-dose losartan  Subjective:   No significant events overnight  Cardiac cath site stable with no bleeding  No chest pain or dyspnea reported  Telemetry Review: No significant arrhythmias seen on telemetry review  Review of Systems   Constitutional: Negative for fever  HENT: Negative for congestion  Respiratory: Negative for chest tightness, shortness of breath and wheezing  Cardiovascular: Negative for chest pain, palpitations and leg swelling  Skin: Negative for rash  Neurological: Negative for syncope  Hematological: Does not bruise/bleed easily  Psychiatric/Behavioral: The patient is not nervous/anxious            Current Facility-Administered Medications:     acetaminophen (TYLENOL) tablet 650 mg, 650 mg, Oral, Q6H PRN, Serene Jade, CRNP    aspirin chewable tablet 81 mg, 81 mg, Oral, Daily, Serene Piety, CRNP, 81 mg at 07/17/21 0900    atorvastatin (LIPITOR) tablet 80 mg, 80 mg, Oral, Daily, Serene Piety, CRNP, 80 mg at 07/17/21 0900    ezetimibe (ZETIA) tablet 10 mg, 10 mg, Oral, Daily, Destiny Kahoka, CRNP, 10 mg at 07/17/21 7539    heparin (porcine) subcutaneous injection 5,000 Units, 5,000 Units, Subcutaneous, Q8H [address], 5,000 Units at 07/17/21 0516 **AND** [COMPLETED] Platelet count, , , Once, Destiny Kahoka, CRNP    insulin lispro (HumaLOG) 100 units/mL subcutaneous injection 1-5 Units, 1-5 Units, Subcutaneous, TID AC **AND** Fingerstick Glucose (POCT), , , TID AC, Shalonda Martinez PA-C    insulin lispro (HumaLOG) 100 units/mL subcutaneous injection 1-5 Units, 1-5 Units, Subcutaneous, HS, Shalonda Martinez PA-C    levothyroxine tablet 75 mcg, 75 mcg, Oral, Early Morning, Destiny Kahoka, CRNP, 75 mcg at 07/17/21 0516    metoprolol tartrate (LOPRESSOR) partial tablet 12 5 mg, 12 5 mg, Oral, Q12H [address], Destiny Kahoka, CRNP, 12 5 mg at 07/17/21 0900    nitroglycerin (NITROSTAT) SL tablet 0 4 mg, 0 4 mg, Sublingual, Q5 Min PRN, Destiny Kahoka, CRNP    [facility]) tablet 90 mg, 90 mg, Oral, Q12H [address], Destiny Kahoka, CRNP, 90 mg at 07/17/21 0900     Objective:     Vitals:   Blood pressure 165/83, pulse 77, temperature 98 6 °F (37 °C), temperature source Oral, resp  rate 16, height 5' 8" (1 727 m), weight 70 1 kg (154 lb 8 7 oz), SpO2 97 %  Body mass index is 23 5 kg/m²    Orthostatic Blood Pressures      Most Recent Value   Blood Pressure  165/83 filed at 07/17/2021 0858   Patient Position - Orthostatic VS  Lying filed at 07/17/2021 6616         Systolic (49RMJ), FDW:426 , Min:99 , ZFE:559     Diastolic (51BZG), FPS:72, Min:56, Max:88      Intake/Output Summary (Last 24 hours) at 7/17/2021 1011  Last data filed at 7/17/2021 1001  Gross per 24 hour   Intake 854 36 ml   Output 3500 ml   Net -2645 64 ml     Weight (last 2 days)     Date/Time   Weight    07/17/21 0600   70 1 (154 54)    07/16/21 1115   67 6 (149 03)    07/16/21 1033   67 6 (149 03)    07/16/21 09:21:46   70 5 (155 42)              Physical Exam  Constitutional:       General: He is not in acute distress  HENT:      Head: Normocephalic  Eyes:      Conjunctiva/sclera: Conjunctivae normal    Cardiovascular:      Rate and Rhythm: Normal rate and regular rhythm  Heart sounds: S1 normal and S2 normal  Murmur heard  Crescendo systolic murmur is present with a grade of 3/6  Comments: Cardiac catheterization access site stable with no bleeding or hematoma  Pulmonary:      Effort: Pulmonary effort is normal       Breath sounds: Normal breath sounds  No wheezing or rales  Abdominal:      General: Bowel sounds are normal  There is no distension  Musculoskeletal:      Right lower leg: No edema  Left lower leg: No edema  Skin:     General: Skin is warm  Neurological:      General: No focal deficit present     Psychiatric:         Mood and Affect: Mood normal            Labs & Results:    Results from last 7 days   Lab Units 07/16/21  1208 07/16/21  0941   TROPONIN I ng/mL 3 32* 0 45*     Results from last 7 days   Lab Units 07/17/21  0516 07/16/21  1208 07/16/21  0941   WBC Thousand/uL 8 81  --  8 14   HEMOGLOBIN g/dL 14 1  --  16 0   HEMATOCRIT % 41 7  --  46 4   PLATELETS Thousands/uL 181 199 228     Results from last 7 days   Lab Units 07/16/21  1208   TRIGLYCERIDES mg/dL 46   HDL mg/dL 59     Results from last 7 days   Lab Units 07/17/21  0516 07/16/21  0941   POTASSIUM mmol/L 3 7 3 6   CHLORIDE mmol/L 109* 102   CO2 mmol/L 27 25   BUN mg/dL 8 12   CREATININE mg/dL 0 78 1 06   CALCIUM mg/dL 8 9 9 6   ALK PHOS U/L  --  112   ALT U/L  --  27   AST U/L  --  26     Results from last 7 days   Lab Units 07/16/21  0941   INR  1 00   PTT seconds 34         Recent Labs     07/16/21  0941   NTBNP 180          Cardiac testing:     Results for orders placed during the hospital encounter of 07/16/21    Echo complete with contrast if indicated    Narrative  [address]  [facility] [place] [address] [facility]  [phone]    Transthoracic Echocardiogram  2D, M-mode, Doppler, and Color Doppler    Study date:  2021    Patient: Fatmata Patel  MR number: [id number]  Account number: [de-identified]  : 1945  Age: 67 years  Gender: Male  Status: Inpatient  Location: Bedside  Height: 68 in  Weight: 155 lb  BP: 132/ 80 mmHg    Indications: Coronary artery disease    Diagnoses: I25 10 - Atherosclerotic heart disease of native coronary artery without angina pectoris    Sonographer:  MONTY Galan  Primary Physician:  Armen Mancuso MD  Referring Physician: Darci Kawasaki, CRNP  Group:  [facility]  Cardiology Fellow:  Mio Choudhury  Sonographer:  MONTY Miller  Interpreting Physician:  DO MINAL Belcher    LEFT VENTRICLE:  Systolic function was normal  Ejection fraction was estimated to be 65 %  There were no regional wall motion abnormalities  RIGHT VENTRICLE:  Systolic function was mildly reduced  MITRAL VALVE:  There was moderate annular calcification  There was moderate thickening of the posterior leaflet  There was moderate regurgitation  AORTIC VALVE:  The valve was trileaflet  Leaflets exhibited moderately increased thickness, moderate to marked calcification, and moderately reduced cuspal separation  There was moderate to severe stenosis  Low flow low gradient AS  There was mild regurgitation  Systolic area of 0 9 cmï¾² by planimetry  Valve mean gradient was 7 5 mmHg  Aortic valve obstructive index (by Vmax) was 0 36  Estimated aortic valve area (by Vmax) was 1 13 cmï¾²  Doppler stroke volume was 47 12 ml  TRICUSPID VALVE:  There was mild regurgitation  PULMONIC VALVE:  There was trace regurgitation  IVC, HEPATIC VEINS:  The respirophasic change in diameter was less than 50%  HISTORY: PRIOR HISTORY: Hypertension, hyperlipidemia, coronary artery disease, STEMI, coronary bypass    PROCEDURE: The procedure was performed at the bedside  This was a routine study   The transthoracic approach was used  The study included complete 2D imaging, M-mode, complete spectral Doppler, and color Doppler  Image quality was adequate  LEFT VENTRICLE: Size was normal  Systolic function was normal  Ejection fraction was estimated to be 65 %  There were no regional wall motion abnormalities  Wall thickness was normal  DOPPLER: Left ventricular diastolic function parameters  were normal     RIGHT VENTRICLE: The size was normal  Systolic function was mildly reduced  Wall thickness was normal     LEFT ATRIUM: Size was normal     RIGHT ATRIUM: Size was normal     MITRAL VALVE: There was moderate annular calcification  Valve structure was normal  There was moderate thickening of the posterior leaflet  There was mildly reduced leaflet separation  DOPPLER: The transmitral velocity was within the  normal range  There was no evidence for stenosis  There was moderate regurgitation  AORTIC VALVE: The valve was trileaflet  Leaflets exhibited moderately increased thickness, moderate to marked calcification, and moderately reduced cuspal separation  DOPPLER: Transaortic velocity was within the normal range  There was  moderate to severe stenosis  Low flow low gradient AS  There was mild regurgitation  TRICUSPID VALVE: The valve structure was normal  There was normal leaflet separation  DOPPLER: The transtricuspid velocity was within the normal range  There was no evidence for stenosis  There was mild regurgitation  Pulmonary artery  systolic pressure was within the normal range  Estimated peak PA pressure was 25 mmHg  PULMONIC VALVE: Leaflets exhibited normal thickness, no calcification, and normal cuspal separation  DOPPLER: The transpulmonic velocity was within the normal range  There was trace regurgitation  PERICARDIUM: There was no pericardial effusion  The pericardium was normal in appearance  AORTA: The root exhibited normal size  The ascending aorta was normal in size      SYSTEMIC VEINS: IVC: The inferior vena cava was normal in size  The respirophasic change in diameter was less than 50%      MEASUREMENT TABLES    2D MEASUREMENTS  LVOT   (Reference normals)  Diam   20 mm   (--)  Aortic valve   (Reference normals)  Area sys, plan   0 9 cmï¾²   (--)    DOPPLER MEASUREMENTS  LVOT   (Reference normals)  Peak tyrese   64 cm/s   (--)  Mean tyrese   39 cm/s   (--)  VTI   15 cm   (--)  Peak gradient   2 mmHg   (--)  Mean gradient   1 mmHg   (--)  R-R interval   1111 ms   (--)  HR   54 bpm   (--)  Stroke vol   47 12 ml   (--)  Cardiac output   2 54 L/min   (--)  Cardiac index   1 38 L/min/mï¾²   (--)  Stroke index   0 3 ml/mï¾²   (--)  Aortic valve   (Reference normals)  Peak tyrese   177 cm/s   (--)  Mean tyrese   138 cm/s   (--)  VTI   49 cm   (--)  Peak gradient   10 mmHg   (--)  Mean gradient   7 5 mmHg   (--)  Obstr index, VTI   0 31    (--)  Valve area, VTI   0 97 cmï¾²   (--)  Area index, VTI   0 53 cmï¾²/mï¾²   (--)  Obstr index, Vmax   0 36    (--)  Valve area, Vmax   1 13 cmï¾²   (--)  Area index, Vmax   0 61 cmï¾²/mï¾²   (--)  Obstr index, Vmean   0 28    (--)  Valve area, Vmean   0 88 cmï¾²   (--)  Area index, Vmean   0 48 cmï¾²/mï¾²   (--)    SYSTEM MEASUREMENT TABLES    2D  %FS: 30 39 %  GARDENIA Planimetry: 1 93 cm2  AVC: 413 2 ms  Ao Diam: 3 23 cm  Ao asc: 3 3 cm  EDV(Teich): 117 95 ml  EF(Teich): 57 55 %  ESV(Teich): 50 07 ml  HR_2Ch_Q: 53 02 BPM  HR_4Ch_Q: 54 04 BPM  IVSd: 0 89 cm  LA Area: 17 47 cm2  LA Diam: 4 33 cm  LVCO_2Ch_Q: 2 59 L/min  LVCO_4Ch_Q: 1 79 L/min  LVCO_BiP_Q: 2 19 L/min  LVEDV MOD A4C: 80 88 ml  LVEF MOD A4C: 58 14 %  LVEF_2Ch_Q: 68 2 %  LVEF_4Ch_Q: 68 1 %  LVEF_BiP_Q: 69 09 %  LVESV MOD A4C: 33 85 ml  LVIDd: 4 99 cm  LVIDs: 3 48 cm  LVLd A4C: 6 79 cm  LVLd_2Ch_Q: 6 29 cm  LVLd_4Ch_Q: 6 32 cm  LVLs A4C: 5 73 cm  LVLs_2Ch_Q: 5 11 cm  LVLs_4Ch_Q: 5 24 cm  LVOT Diam: 1 98 cm  LVPWd: 0 92 cm  LVSV_2Ch_Q: 48 89 ml  LVSV_4Ch_Q: 33 12 ml  LVSV_BiP_Q: 40 83 ml  LVVED_2Ch_Q: 71 68 ml  LVVED_4Ch_Q: 48 63 ml  LVVED_BiP_Q: 59 1 ml  LVVES_2Ch_Q: 22 79 ml  LVVES_4Ch_Q: 15 51 ml  LVVES_BiP_Q: 18 27 ml  RA Area: 12 46 cm2  RVIDd: 2 88 cm  SV MOD A4C: 47 02 ml  SV(Teich): 67 88 ml    CW  TR Vmax: 2 28 m/s  TR maxP 81 mmHg    MM  TAPSE: 1 58 cm    PW  E' Sept: 0 08 m/s  E/E' Sept: 12 76  MV A [place]: 0 88 m/s  MV Dec [place]: 4 53 m/s2  MV DecT: 224 81 ms  MV E [place]: 1 02 m/s  MV E/A Ratio: 1 15  MV PHT: 65 19 ms  MVA By PHT: 3 37 cm2    [employer] Accredited Echocardiography Laboratory    Prepared and electronically signed by    Prerna Leal DO  Signed 2021 16:31:12    No results found for this or any previous visit  Results for orders placed during the hospital encounter of 21    Cardiac catheterization    Narrative  [facility]  [address] [place] [address]  [phone]    [facility]    Invasive Cardiovascular Lab Complete Report    Patient: Moshe Maldonado  MR number: [id number]  Account number: [de-identified]  Study date: 2021  Gender: Male  : 1945  Height: 68 1 in  Weight: 155 1 lb  BSA: 1 84 mï¾²    Allergies: NO KNOWN ALLERGIES    Diagnostic Cardiologist:  Mary Ernst MD  Interventional Cardiologist:  Mary Ernst MD  Primary Physician:  Britta Crespo MD    SUMMARY    CORONARY CIRCULATION:  Proximal LAD: There was a 100 % stenosis  This lesion is a chronic total occlusion  Proximal circumflex: There was a tubular 75 % stenosis at the site of a prior stent  An intervention was performed  1st obtuse marginal: There was a 99 % stenosis at the site of a prior stent  The lesion was associated with a large filling defect consistent with thrombus  There was DANIEL grade 2 flow through the vessel (partial perfusion)  This lesion is  a likely culprit for the patient's recent myocardial infarction  An intervention was performed  Proximal RCA: There was a tubular 70 % stenosis at the site of a prior stent  It appears amenable to percutaneous intervention  Graft to the 1st obtuse marginal: There was a 100 % stenosis at the proximal anastomosis  It does not appear amenable to intervention  Graft to the distal RCA: There was a 100 % stenosis at the proximal anastomosis  It does not appear amenable to intervention  HEMODYNAMICS:  Hemodynamic assessment demonstrated mildly elevated LVEDP  1ST LESION INTERVENTIONS:  A successful drug-eluting stent with thrombectomy and balloon angioplasty procedure was performed on the 99 % lesion in the 1st obtuse marginal  Following intervention there was an excellent angiographic appearance with a 0 % residual  stenosis  A Xience Anisa Rx 4 0 x 15mm drug-eluting stent was placed across the lesion and deployed at a maximum inflation pressure of 20 marcos  2ND LESION INTERVENTIONS:  A successful balloon angioplasty procedure was performed on the 75 % lesion in the proximal circumflex  Following intervention there was an excellent angiographic appearance with a 10 % residual stenosis  PROCEDURES PERFORMED    --  Left heart catheterization with ventriculography  --  Left coronary angiography  --  Right coronary angiography  --  LIMA graft angiography  --  Arch aortography with cardiac catheterization  --  Diagnostic intravascular ultrasound (IVUS)  --  Acute Myocardial Infarct  --  Mod Sedation Same Physician Initial 15min  --  IVUS  --  Coronary Catheterization (w/ LHC, w/ Grafts)  --  AMI PCI (HARIKA, PTCRA, PTCA) Single  --  Intervention on OM1: drug-eluting stent, thrombectomy, balloon angioplasty  --  Intervention on proximal circumflex: balloon angioplasty  PROCEDURE: The risks and alternatives of the procedures and conscious sedation were explained to the patient and informed consent was obtained  The patient was brought to the cath lab and placed on the table  The planned puncture sites  were prepped and draped in the usual sterile fashion      --  Right femoral artery access  The puncture site was infiltrated with local anesthetic  The vessel was accessed using the modified Seldinger technique, a wire was advanced into the vessel, and a sheath was advanced over the wire into the  vessel  --  Left heart catheterization with ventriculography  A catheter was advanced over a guidewire into the ascending aorta  After recording ascending aortic pressure, the catheter was advanced across the aortic valve and left ventricular  pressure was recorded  Ventriculography was performed  The catheter was pulled back across the aortic valve and into the ascending aorta and pullback pressures were obtained  --  Left coronary artery angiography  A catheter was advanced over a guidewire into the aorta and positioned in the left coronary artery ostium under fluoroscopic guidance  Angiography was performed  --  Right coronary artery angiography  A catheter was advanced over a guidewire into the aorta and positioned in the right coronary artery ostium under fluoroscopic guidance  Angiography was performed  --  Left internal mammary graft angiography  A catheter was advanced to the aorta and positioned in the left subclavian artery over a guidewire under fluoroscopic guidance  Angiography was performed  --  Arch aortography with cardiac catheterization  A catheter was positioned into the ascending aorta over a guide wire under fluoroscopic guidance and contrast was injected  Angiography was performed  --  Diagnostic intravascular ultrasound (IVUS) was performed  A(n) IVUS catheter was advanced over the wire into the vessel and IVUS imaging was performed  --  Acute Myocardial Infarct  --  Mod Sedation Same Physician Initial 15min  --  IVUS  --  Coronary Catheterization (w/ LHC, w/ Grafts)      LESION #1 INTERVENTION: A successful drug-eluting stent with thrombectomy and balloon angioplasty procedure was performed on the 99 % lesion in the 1st obtuse marginal  Following intervention there was an excellent angiographic appearance  with a 0 % residual stenosis  This was an ACC/AHA type B "moderate risk" lesion for intervention  The residual lesion demonstrated no evidence of thrombus  There was DANIEL 2 flow before the procedure and DANIEL 3 flow after the procedure  There was no dissection  --  Vessel setup was performed  A 6Fr  Launcher Ebu 3 75 guiding catheter was used to cannulate the vessel  --  Vessel setup was performed  A Runthrough NS 180cm wire was used to cross the lesion  --  Balloon angioplasty was performed, using a Trek Rx 2 5 x 15mm balloon, with 1 inflations and a maximum inflation pressure of 16 marcos  --  Mechanical ( 6Fr  [place] AP) thrombectomy was performed, with max duration 31 sec, max volume out 27 ml, and 2 attempt(s)  --  Balloon angioplasty was performed, using a Trek Rx 4 0 x 15mm balloon, with 2 inflations and a maximum inflation pressure of 14 marcos  --  A Xience Anisa Rx 4 0 x 15mm drug-eluting stent was placed across the lesion and deployed at a maximum inflation pressure of 20 marcos  LESION #2 INTERVENTION: A successful balloon angioplasty procedure was performed on the 75 % lesion in the proximal circumflex  Following intervention there was an excellent angiographic appearance with a 10 % residual stenosis  There was  DANIEL 3 flow before the procedure and DANIEL 3 flow after the procedure  There was no dissection  --  Vessel setup was performed  A 6Fr  Launcher Ebu 3 75 guiding catheter was used to cannulate the vessel  --  Vessel setup was performed  A Runthrough NS 180cm wire was used to cross the lesion  --  Balloon angioplasty was performed, using a Trek Rx 2 5 x 15mm balloon, with 1 inflations and a maximum inflation pressure of 18 marcos  --  Balloon angioplasty was performed, using a Trek Rx 4 0 x 15mm balloon, with 2 inflations and a maximum inflation pressure of 16 marcos      INTERVENTIONS:  --  AMI PCI (HARIKA, PTCRA, PTCA) Single  PROCEDURE COMPLETION: The patient tolerated the procedure well and was discharged from the cath lab  TIMING: Test started at 09:41  Test concluded at 10:48  HEMOSTASIS: The sheath was removed over a wire and the Angioseal delivery sheath  was inserted into the femoral artery  Hemostasis was obtained using a closure device ( Angioseal) deployed through the delivery sheath  MEDICATIONS GIVEN: Versed (2mg/2ml), 2 mg, IV, at 09:42  Fentanyl (1OOmcg/2 ml), 50 mcg, IV, at 09:43   1% Lidocaine, 10 ml, subcutaneously, at 09:43  Heparin 1000 units/ml, 3,000 units, IV, at 09:58  Nitroglycerine (200mcg/ml), 200 mcg, at 10:02  Verapamil (5mg/2ml), 100 mcg, at 10:11  Verapamil (5mg/2ml), 100 mcg, at 10:14  Verapamil  (5mg/2ml), 100 mcg, at 10:20  Nitroglycerine (200mcg/ml), 200 mcg, at 10:27  Fentanyl (1OOmcg/2 ml), 50 mcg, IV, at 10:48  CONTRAST GIVEN: 100 ml Omnipaque (350mg I /ml)  110 ml Omnipaque (350mg I /ml)  RADIATION EXPOSURE: Fluoroscopy  time: 15 22 min  HEMODYNAMICS: Hemodynamic assessment demonstrated mildly elevated LVEDP  VALVES:  AORTIC VALVE:   --  There was no aortic stenosis  There was no significant aortic insufficiency  CORONARY VESSELS:   --  The coronary circulation is co-dominant  --  Left main: The vessel was large sized  Angiography showed minor luminal irregularities  --  LAD: The vessel was medium sized  The artery was supplied by a patent bypass graft  --  Proximal LAD: There was a 100 % stenosis  This lesion is a chronic total occlusion  --  Proximal circumflex: There was a tubular 75 % stenosis at the site of a prior stent  An intervention was performed  --  1st obtuse marginal: The vessel was large sized  The artery was supplied by an occluded bypass graft  There was a 99 % stenosis at the site of a prior stent  The lesion was associated with a large filling defect consistent with  thrombus  There was DANIEL grade 2 flow through the vessel (partial perfusion)  This lesion is a likely culprit for the patient's recent myocardial infarction  An intervention was performed  --  Proximal RCA: There was a tubular 70 % stenosis at the site of a prior stent  It appears amenable to percutaneous intervention  --  Graft to the 1st obtuse marginal: The graft was a saphenous vein graft from the aorta  There was a 100 % stenosis at the proximal anastomosis  It does not appear amenable to intervention  --  Graft to the distal RCA: The graft was a saphenous vein graft from the aorta  There was a 100 % stenosis at the proximal anastomosis  It does not appear amenable to intervention  AORTA:   --  The aortic root and ascending aorta were satisfactorily visualized  There was no atheroma  There was no evidence for dissection  RECOMMENDATIONS  Patient management should include increasing lipid lowering therapy  Patient management to include dual antiplatelet therapy  DISPOSITION:  The patient left the catheterization laboratory in stable condition  Prepared and signed by    Frandy Chapin MD  Signed 07/16/2021 10:59:08    Study diagram    Angiographic findings  Native coronary lesions:  ï¾·Proximal LAD: Lesion 1: 100 % stenosis  ï¾·Proximal circumflex: Lesion 1: tubular, 75 % stenosis, site of prior stent  ï¾·OM1: Lesion 1: 99 % stenosis, site of prior stent  ï¾·Proximal RCA: Lesion 1: tubular, 70 % stenosis, site of prior stent  Coronary graft lesions:  ï¾·Graft to OM1: SVG  ï¾· 100 % stenosis at proximal anastomosis  ï¾·Graft to distal RCA: SVG  ï¾· 100 % stenosis at proximal anastomosis  Intervention results  Native coronary lesions:  ï¾·Successful drug-eluting stent, thrombectomy, and balloon angioplasty of the 99 % stenosis in OM1  Appearance excellent with 0 % residual stenosis  Stent: Jayy Olmstead Rx 4 0 x 15mm drug-eluting  ï¾·Successful balloon angioplasty of the 75 % stenosis in proximal circumflex   Appearance excellent with 10 % residual stenosis  Hemodynamic tables    Pressures:  Baseline  Pressures:  - HR: 62  Pressures:  - Rhythm:  Pressures:  -- Aortic Pressure (S/D/M): 118/57/51  Pressures:  -- Left Ventricle (s/edp): 109/20/--    Outputs:  Baseline  Outputs:  -- CALCULATIONS: [age]  Outputs:  -- CALCULATIONS: Body Surface Area: 1 84  Outputs:  -- CALCULATIONS: Height in cm: 173 00  Outputs:  -- CALCULATIONS: Sex: Male  Outputs:  -- CALCULATIONS: Weight in k 50    No results found for this or any previous visit

## 2021-07-17 NOTE — PLAN OF CARE
Problem: MOBILITY - ADULT  Goal: Maintain or return to baseline ADL function  Description: INTERVENTIONS:  -  Assess patient's ability to carry out ADLs; assess patient's baseline for ADL function and identify physical deficits which impact ability to perform ADLs (bathing, care of mouth/teeth, toileting, grooming, dressing, etc )  - Assess/evaluate cause of self-care deficits   - Assess range of motion  - Assess patient's mobility; develop plan if impaired  - Assess patient's need for assistive devices and provide as appropriate  - Encourage maximum independence but intervene and supervise when necessary  - Involve family in performance of ADLs  - Assess for home care needs following discharge   - Consider OT consult to assist with ADL evaluation and planning for discharge  - Provide patient education as appropriate  Outcome: Progressing  Goal: Maintains/Returns to pre admission functional level  Description: INTERVENTIONS:  - Perform BMAT or MOVE assessment daily    - Set and communicate daily mobility goal to care team and patient/family/caregiver  - Collaborate with rehabilitation services on mobility goals if consulted  - Perform Range of Motion 4 times a day  - Reposition patient every 4 hours    - Dangle patient 4 times a day  - Stand patient 4 times a day  - Ambulate patient 4 times a day  - Out of bed to chair 4 times a day   - Out of bed for meals 4 times a day  - Out of bed for toileting  - Record patient progress and toleration of activity level   Outcome: Progressing     Problem: Prexisting or High Potential for Compromised Skin Integrity  Goal: Skin integrity is maintained or improved  Description: INTERVENTIONS:  - Identify patients at risk for skin breakdown  - Assess and monitor skin integrity  - Assess and monitor nutrition and hydration status  - Monitor labs   - Assess for incontinence   - Turn and reposition patient  - Assist with mobility/ambulation  - Relieve pressure over bony prominences  - Avoid friction and shearing  - Provide appropriate hygiene as needed including keeping skin clean and dry  - Evaluate need for skin moisturizer/barrier cream  - Collaborate with interdisciplinary team   - Patient/family teaching  - Consider wound care consult   Outcome: Progressing     Problem: PAIN - ADULT  Goal: Verbalizes/displays adequate comfort level or baseline comfort level  Description: Interventions:  - Encourage patient to monitor pain and request assistance  - Assess pain using appropriate pain scale  - Administer analgesics based on type and severity of pain and evaluate response  - Implement non-pharmacological measures as appropriate and evaluate response  - Consider cultural and social influences on pain and pain management  - Notify physician/advanced practitioner if interventions unsuccessful or patient reports new pain  Outcome: Progressing     Problem: INFECTION - ADULT  Goal: Absence or prevention of progression during hospitalization  Description: INTERVENTIONS:  - Assess and monitor for signs and symptoms of infection  - Monitor lab/diagnostic results  - Monitor all insertion sites, i e  indwelling lines, tubes, and drains  - Monitor endotracheal if appropriate and nasal secretions for changes in amount and color  - [place] appropriate cooling/warming therapies per order  - Administer medications as ordered  - Instruct and encourage patient and family to use good hand hygiene technique  - Identify and instruct in appropriate isolation precautions for identified infection/condition  Outcome: Progressing  Goal: Absence of fever/infection during neutropenic period  Description: INTERVENTIONS:  - Monitor WBC    Outcome: Progressing     Problem: SAFETY ADULT  Goal: Maintain or return to baseline ADL function  Description: INTERVENTIONS:  -  Assess patient's ability to carry out ADLs; assess patient's baseline for ADL function and identify physical deficits which impact ability to perform ADLs (bathing, care of mouth/teeth, toileting, grooming, dressing, etc )  - Assess/evaluate cause of self-care deficits   - Assess range of motion  - Assess patient's mobility; develop plan if impaired  - Assess patient's need for assistive devices and provide as appropriate  - Encourage maximum independence but intervene and supervise when necessary  - Involve family in performance of ADLs  - Assess for home care needs following discharge   - Consider OT consult to assist with ADL evaluation and planning for discharge  - Provide patient education as appropriate  Outcome: Progressing  Goal: Maintains/Returns to pre admission functional level  Description: INTERVENTIONS:  - Perform BMAT or MOVE assessment daily    - Set and communicate daily mobility goal to care team and patient/family/caregiver  - Collaborate with rehabilitation services on mobility goals if consulted  - Perform Range of Hycua7u 4 times a day  - Reposition patient every 4 hours    - Dangle patient 4 times a day  - Stand patient 4 times a day  - Ambulate patient 4 times a day  - Out of bed to chair 4 times a day   - Out of bed for meals 4 times a day  - Out of bed for toileting  - Record patient progress and toleration of activity level   Outcome: Progressing  Goal: Patient will remain free of falls  Description: INTERVENTIONS:  - Educate patient/family on patient safety including physical limitations  - Instruct patient to call for assistance with activity   - Consult OT/PT to assist with strengthening/mobility   - Keep Call bell within reach  - Keep bed low and locked with side rails adjusted as appropriate  - Keep care items and personal belongings within reach  - Initiate and maintain comfort rounds  - Make Fall Risk Sign visible to staff  - Offer Toileting every 4 Hours, in advance of need  - Initiate/Maintain bed alarm  - Obtain necessary fall risk management equipment: bed alarm  - Apply yellow socks and bracelet for high fall risk patients  - Consider moving patient to room near nurses station  Outcome: Progressing     Problem: DISCHARGE PLANNING  Goal: Discharge to home or other facility with appropriate resources  Description: INTERVENTIONS:  - Identify barriers to discharge w/patient and caregiver  - Arrange for needed discharge resources and transportation as appropriate  - Identify discharge learning needs (meds, wound care, etc )  - Arrange for interpretive services to assist at discharge as needed  - Refer to Case Management Department for coordinating discharge planning if the patient needs post-hospital services based on physician/advanced practitioner order or complex needs related to functional status, cognitive ability, or social support system  Outcome: Progressing     Problem: Knowledge Deficit  Goal: Patient/family/caregiver demonstrates understanding of disease process, treatment plan, medications, and discharge instructions  Description: Complete learning assessment and assess knowledge base    Interventions:  - Provide teaching at level of understanding  - Provide teaching via preferred learning methods  Outcome: Progressing

## 2021-07-17 NOTE — ASSESSMENT & PLAN NOTE
· Inferior STEMI  · Taken to cath lab  of LAD with collateralization, PCI with HARIKA to proximal circ and OM1 (felt to be culprit lesion)  · Tele monitoring for re-perfusion arrhythmia  · ASA, brilinta, statin, zetia, lopressor 12 5 BID    ECHO: LEFT VENTRICLE:  Systolic function was normal  Ejection fraction was estimated to be 65 %  There were no regional wall motion abnormalities      RIGHT VENTRICLE:  Systolic function was mildly reduced      MITRAL VALVE:  There was moderate regurgitation      AORTIC VALVE:  There was moderate to severe stenosis  Low flow low gradient AS      · R femoral access, small hematoma, monitor site

## 2021-07-17 NOTE — ASSESSMENT & PLAN NOTE
Lab Results   Component Value Date    HGBA1C 7 0 (H) 07/16/2021       No results for input(s): POCGLU in the last 72 hours      Blood Sugar Average: Last 72 hrs:     Hgb A1c 7 0  Start SSI  Consider outpatient endocrine follow-up

## 2021-07-17 NOTE — PROGRESS NOTES
[address] [facility]  Progress Note - Bill Terrell 1945, 67 y o  male MRN: [id number]  Unit/Bed#: [facility] 275-74 Encounter: [id number]  Primary Care Provider: Milana Rodriguez MD   Date and time admitted to hospital: 7/16/2021  9:18 AM    * STEMI (ST elevation myocardial infarction) [facility])  Assessment & Plan  · Inferior STEMI  · Taken to cath lab  of LAD with collateralization, PCI with HARIKA to proximal circ and OM1 (felt to be culprit lesion)  · Tele monitoring for re-perfusion arrhythmia  · ASA, brilinta, statin, zetia, lopressor 12 5 BID    ECHO: LEFT VENTRICLE:  Systolic function was normal  Ejection fraction was estimated to be 65 %  There were no regional wall motion abnormalities      RIGHT VENTRICLE:  Systolic function was mildly reduced      MITRAL VALVE:  There was moderate regurgitation      AORTIC VALVE:  There was moderate to severe stenosis  Low flow low gradient AS  · R femoral access, small hematoma, monitor site    Severe aortic stenosis  Assessment & Plan  · Follow up per cardiology  · Consideration for TAVR    Hx of CABG  Assessment & Plan  · Remote CABG in 1992, had PCI in 2005    Hypertension  Assessment & Plan  BP control with BB and +/-ACEI per cardiology    Type 2 diabetes mellitus ([facility] [address] )  Assessment & Plan  Lab Results   Component Value Date    HGBA1C 7 0 (H) 07/16/2021       No results for input(s): POCGLU in the last 72 hours  Blood Sugar Average: Last 72 hrs:     Hgb A1c 7 0  Start SSI  Consider outpatient endocrine follow-up    Disease of thyroid gland  Assessment & Plan  · levothyroxine    Left arm pain  Assessment & Plan  Resolved    ----------------------------------------------------------------------------------------  HPI/24hr events: Pain free all night  Disposition: Transfer to Stepdown Level 2  Critical care to sign off     Code Status: Level 1 - Full Code  ---------------------------------------------------------------------------------------  SUBJECTIVE  "I just want to go home"    Review of Systems   Respiratory: Negative for chest tightness and shortness of breath  Cardiovascular: Negative for chest pain  All other systems reviewed and are negative     ---------------------------------------------------------------------------------------  OBJECTIVE    Vitals   Vitals:    21 2300 21 0000 21 0100 21 0200   BP:  134/71  118/68   BP Location:  Left arm     Pulse: 56 60 (!) 54 58   Resp: 14 16 16 17   Temp:  98 6 °F (37 °C)     TempSrc:  Oral     SpO2: 97% 97% 97% 97%   Weight:       Height:         Temp (24hrs), Av 2 °F (36 8 °C), Min:97 8 °F (36 6 °C), Max:98 6 °F (37 °C)  Current: Temperature: 98 6 °F (37 °C)    Respiratory:  SpO2: SpO2: 97 %, SpO2 Activity: SpO2 Activity: At Rest, SpO2 Device: O2 Device: None (Room air)    Physical Exam  Vitals reviewed  Constitutional:       General: He is awake  Cardiovascular:      Rate and Rhythm: Normal rate and regular rhythm  Heart sounds: Murmur heard  Systolic murmur is present with a grade of 2/6  Pulmonary:      Effort: Pulmonary effort is normal       Breath sounds: Normal breath sounds  No wheezing, rhonchi or rales  Abdominal:      Palpations: Abdomen is soft  Skin:     General: Skin is warm and dry  Neurological:      Mental Status: He is alert and oriented to person, place, and time  Mental status is at baseline  Psychiatric:         Mood and Affect: Mood and affect normal          Speech: Speech normal          Behavior: Behavior is cooperative           Laboratory and Diagnostics:  Results from last 7 days   Lab Units 21  1208 21  0941   WBC Thousand/uL  --  8 14   HEMOGLOBIN g/dL  --  16 0   HEMATOCRIT %  --  46 4   PLATELETS Thousands/uL 199 228   NEUTROS PCT %  --  43   MONOS PCT %  --  8     Results from last 7 days   Lab Units 07/16/21  0941   SODIUM mmol/L 136   POTASSIUM mmol/L 3 6   CHLORIDE mmol/L 102   CO2 mmol/L 25   ANION GAP mmol/L 9   BUN mg/dL 12   CREATININE mg/dL 1 06   CALCIUM mg/dL 9 6   GLUCOSE RANDOM mg/dL 248*   ALT U/L 27   AST U/L 26   ALK PHOS U/L 112   ALBUMIN g/dL 3 8   TOTAL BILIRUBIN mg/dL 0 90          Results from last 7 days   Lab Units 07/16/21  0941   INR  1 00   PTT seconds 34      Results from last 7 days   Lab Units 07/16/21  1208 07/16/21  0941   TROPONIN I ng/mL 3 32* 0 45*     Intake and Output  I/O       07/15 0701 - 07/16 0700 07/16 0701 - 07/17 0700    P  O   240    I V  (mL/kg)  864 4 (12 8)    Total Intake(mL/kg)  1104 4 (16 3)    Urine (mL/kg/hr)  2150    Total Output  2150    Net  -1045 6              Height and Weights   Height: 5' 8" (172 7 cm)  IBW (Ideal Body Weight): 68 4 kg  Body mass index is 22 66 kg/m²  Weight (last 2 days)     Date/Time   Weight    07/16/21 1115   67 6 (149 03)    07/16/21 1033   67 6 (149 03)    07/16/21 09:21:46   70 5 (155 42)                Nutrition       Diet Orders   (From admission, onward)             Start     Ordered    07/16/21 1031  Diet Cardiovascular; Cardiac  Diet effective now     Question Answer Comment   Diet Type Cardiovascular    Cardiac Cardiac    RD to adjust diet per protocol?  Yes        07/16/21 1032              Active Medications  Scheduled Meds:  Current Facility-Administered Medications   Medication Dose Route Frequency Provider Last Rate    acetaminophen  650 mg Oral Q6H PRN Tonny Short, CRNP      aspirin  81 mg Oral Daily Tonny Short, [address]      atorvastatin  80 mg Oral Daily Tonny Short, [address]      ezetimibe  10 mg Oral Daily Tonny Short, CRNP      heparin (porcine)  5,000 Units Subcutaneous [facility] Tonny Short, [address]      insulin lispro  1-5 Units Subcutaneous TID AC Shalonda Martinez PA-C      insulin lispro  1-5 Units Subcutaneous HS Shalonda Martinez PA-C      levothyroxine  75 mcg Oral Early Morning Diedre Jeans Dwana Nose, CRNP      metoprolol tartrate  12 5 mg Oral Q12H [address] Serene Piety, [address]      nitroglycerin  0 4 mg Sublingual Q5 Min PRN Serene Piety, [address]      ticagrelor  90 mg Oral Q12H [address] Serene Piety, CRNP       Continuous Infusions:     PRN Meds:   acetaminophen, 650 mg, Q6H PRN  nitroglycerin, 0 4 mg, Q5 Min PRN        Invasive Devices Review  Invasive Devices     Peripheral Intravenous Line            Peripheral IV 07/16/21 Left Forearm <1 day    Peripheral IV 07/16/21 Right Antecubital <1 day              Rationale for remaining devices: n/a  ---------------------------------------------------------------------------------------  Care Time Delivered:   No Critical Care time spent     Ant Stoll PA-C    Portions of the record may have been created with voice recognition software  Occasional wrong word or "sound a like" substitutions may have occurred due to the inherent limitations of voice recognition software    Read the chart carefully and recognize, using context, where substitutions have occurred

## 2021-07-18 LAB
ANION GAP SERPL CALCULATED.3IONS-SCNC: 2 MMOL/L (ref 4–13)
BUN SERPL-MCNC: 8 MG/DL (ref 5–25)
CALCIUM SERPL-MCNC: 9.3 MG/DL (ref 8.3–10.1)
CHLORIDE SERPL-SCNC: 107 MMOL/L (ref 100–108)
CO2 SERPL-SCNC: 31 MMOL/L (ref 21–32)
CREAT SERPL-MCNC: 0.81 MG/DL (ref 0.6–1.3)
ERYTHROCYTE [DISTWIDTH] IN BLOOD BY AUTOMATED COUNT: 12.6 % (ref 11.6–15.1)
GFR SERPL CREATININE-BSD FRML MDRD: 86 ML/MIN/1.73SQ M
GLUCOSE SERPL-MCNC: 111 MG/DL (ref 65–140)
GLUCOSE SERPL-MCNC: 116 MG/DL (ref 65–140)
HCT VFR BLD AUTO: 41.8 % (ref 36.5–49.3)
HGB BLD-MCNC: 14.3 G/DL (ref 12–17)
MAGNESIUM SERPL-MCNC: 2.2 MG/DL (ref 1.6–2.6)
MCH RBC QN AUTO: 32.6 PG (ref 26.8–34.3)
MCHC RBC AUTO-ENTMCNC: 34.2 G/DL (ref 31.4–37.4)
MCV RBC AUTO: 95 FL (ref 82–98)
PLATELET # BLD AUTO: 192 THOUSANDS/UL (ref 149–390)
PMV BLD AUTO: 10 FL (ref 8.9–12.7)
POTASSIUM SERPL-SCNC: 3.9 MMOL/L (ref 3.5–5.3)
RBC # BLD AUTO: 4.39 MILLION/UL (ref 3.88–5.62)
SODIUM SERPL-SCNC: 140 MMOL/L (ref 136–145)
WBC # BLD AUTO: 9.1 THOUSAND/UL (ref 4.31–10.16)

## 2021-07-18 PROCEDURE — 83735 ASSAY OF MAGNESIUM: CPT | Performed by: PHYSICIAN ASSISTANT

## 2021-07-18 PROCEDURE — 85027 COMPLETE CBC AUTOMATED: CPT | Performed by: PHYSICIAN ASSISTANT

## 2021-07-18 PROCEDURE — 80048 BASIC METABOLIC PNL TOTAL CA: CPT | Performed by: PHYSICIAN ASSISTANT

## 2021-07-18 PROCEDURE — 82948 REAGENT STRIP/BLOOD GLUCOSE: CPT

## 2021-07-18 RX ORDER — NITROGLYCERIN 0.4 MG/1
0.4 TABLET SUBLINGUAL
Qty: 30 TABLET | Refills: 0 | Status: SHIPPED | OUTPATIENT
Start: 2021-07-18

## 2021-07-18 RX ORDER — LOSARTAN POTASSIUM 25 MG/1
25 TABLET ORAL DAILY
Qty: 30 TABLET | Refills: 11 | Status: SHIPPED | OUTPATIENT
Start: 2021-07-19

## 2021-07-18 RX ADMIN — TICAGRELOR 90 MG: 90 TABLET ORAL at 08:28

## 2021-07-18 RX ADMIN — ASPIRIN 81 MG CHEWABLE TABLET 81 MG: 81 TABLET CHEWABLE at 08:28

## 2021-07-18 RX ADMIN — ATORVASTATIN CALCIUM 80 MG: 80 TABLET, FILM COATED ORAL at 08:28

## 2021-07-18 RX ADMIN — LOSARTAN POTASSIUM 25 MG: 25 TABLET, FILM COATED ORAL at 08:28

## 2021-07-18 RX ADMIN — Medication 12.5 MG: at 08:28

## 2021-07-18 RX ADMIN — HEPARIN SODIUM 5000 UNITS: 5000 INJECTION INTRAVENOUS; SUBCUTANEOUS at 04:37

## 2021-07-18 RX ADMIN — LEVOTHYROXINE SODIUM 75 MCG: 75 TABLET ORAL at 04:37

## 2021-07-18 RX ADMIN — EZETIMIBE 10 MG: 10 TABLET ORAL at 08:29

## 2021-07-18 NOTE — ED PROVIDER NOTES
History  Chief Complaint   Patient presents with    Chest Pain     Pt brought in by EMS after c/o CP after breakfast this AM  Pt reports pain radiating to bilateral shoulders  Pt has hx of CABG and stent placements  60-year-old male with history of prior CABG, stenting x5 in 2005 presenting for evaluation of sudden onset central chest pressure that perhaps started about an hour prior to arrival after breakfast this morning  Pain feels similar to his prior MI's  He has some associated SOB as well  ECG was transmitted prehospital showing significant ST elevations in inferolateral leads with reciprocal depressions, progressed from initial ECG taken by EMS  Hemodynamically stable  EMS reports possible episode of a few seconds of vtach that resolved spontaneously although they were unable to print a strip of it  Hasn't had any significant diaphoresis  Some nausea, no vomiting  Has been feeling otherwise fine up until this episode of chest pain  Cardiology notified prehospital of suspected STEMI  History provided by:  Patient and EMS personnel   used: No    Chest Pain  Associated symptoms: nausea and shortness of breath    Associated symptoms: no abdominal pain, no back pain, no cough, no dizziness, no fever, no headache, no palpitations and not vomiting        Prior to Admission Medications   Prescriptions Last Dose Informant Patient Reported? Taking?    Cranberry 500 MG CAPS Unknown at Unknown time  Yes No   Sig: Take 500 mg by mouth daily   EPINEPHrine (EPIPEN) 0 3 mg/0 3 mL SOAJ   No No   Sig: Inject 0 3 mL (0 3 mg total) into a muscle once for 1 dose   Multiple Vitamin (MULTIVITAMIN) capsule 7/16/2021 at Unknown time  Yes Yes   Sig: Take 1 capsule by mouth daily   Omega-3 Fatty Acids (FISH OIL) 1,000 mg Unknown at Unknown time  Yes No   Sig: Take 1,000 mg by mouth daily   aspirin 81 mg chewable tablet 7/16/2021 at Unknown time  Yes Yes   Sig: Chew 81 mg daily   atorvastatin (LIPITOR) 80 mg tablet 2021 at Unknown time  Yes Yes   Sig: Take 80 mg by mouth daily   co-enzyme Q-10 30 MG capsule 2021 at Unknown time  Yes Yes   Sig: Take 100 mg by mouth daily   diphenhydrAMINE (BENADRYL) 25 mg tablet Not Taking at Unknown time  No No   Sig: Take 2 tablets (50 mg total) by mouth every 8 (eight) hours as needed for itching   Patient not taking: Reported on 2019   ezetimibe (ZETIA) 10 mg tablet Unknown at Unknown time  Yes No   Sig: Take 10 mg by mouth daily   levothyroxine 25 mcg tablet 2021 at Unknown time  Yes Yes   Sig: Take 75 mcg by mouth daily    metoprolol tartrate (LOPRESSOR) 25 mg tablet Unknown at Unknown time  Yes No   Sig: Take 25 mg by mouth every 12 (twelve) hours      Facility-Administered Medications: None       Past Medical History:   Diagnosis Date    Cardiac disease     [facility]congest[facility] )     Coronary artery disease     Disease of thyroid gland     Hematuria     Hyperlipidemia     Hypertension     Renal calculi     Type 2 diabetes mellitus ([facility] ) 2021    UTI (urinary tract infection)        Past Surgical History:   Procedure Laterality Date    CARDIAC SURGERY      CORONARY ANGIOPLASTY WITH STENT PLACEMENT      CORONARY ARTERY BYPASS GRAFT      HERNIA REPAIR         Family History   Problem Relation Age of Onset    Heart disease Mother     Hypertension Mother     Lung cancer Father         smoker     I have reviewed and agree with the history as documented      E-Cigarette/Vaping     E-Cigarette/Vaping Substances    Nicotine No     THC No     CBD No     Flavoring No      Social History     Tobacco Use    Smoking status: Former Smoker     Types: Cigarettes     Quit date:      Years since quittin 5    Smokeless tobacco: Never Used   Vaping Use    Vaping Use: Never assessed   Substance Use Topics    Alcohol use: No     Comment: socially    Drug use: No        Review of Systems   Constitutional: Negative for chills and fever    HENT: Negative for congestion and sore throat  Eyes: Negative for pain and visual disturbance  Respiratory: Positive for shortness of breath  Negative for cough and wheezing  Cardiovascular: Positive for chest pain  Negative for palpitations  Gastrointestinal: Positive for nausea  Negative for abdominal pain, diarrhea and vomiting  Genitourinary: Negative for dysuria and hematuria  Musculoskeletal: Negative for arthralgias and back pain  Skin: Negative for color change and rash  Neurological: Negative for dizziness, seizures, syncope, light-headedness and headaches  Psychiatric/Behavioral: Negative for confusion  The patient is nervous/anxious  All other systems reviewed and are negative  Physical Exam  ED Triage Vitals   Temperature Pulse Respirations Blood Pressure SpO2   07/16/21 0921 07/16/21 0919 07/16/21 0919 07/16/21 0921 07/16/21 0919   97 8 °F (36 6 °C) 86 17 136/82 96 %      Temp Source Heart Rate Source Patient Position - Orthostatic VS BP Location FiO2 (%)   07/16/21 0921 07/16/21 0919 07/16/21 0919 07/16/21 1115 --   Oral Monitor Lying Left arm       Pain Score       07/16/21 0920       2             Orthostatic Vital Signs  Vitals:    07/17/21 2309 07/18/21 0224 07/18/21 0721 07/18/21 0821   BP: 102/64 134/62 111/57 113/58   Pulse: 70 72 72    Patient Position - Orthostatic VS:           Physical Exam  Vitals and nursing note reviewed  Constitutional:       General: He is not in acute distress  Appearance: He is well-developed  He is ill-appearing  He is not diaphoretic  HENT:      Head: Normocephalic and atraumatic  Eyes:      Conjunctiva/sclera: Conjunctivae normal       Pupils: Pupils are equal, round, and reactive to light  Cardiovascular:      Rate and Rhythm: Normal rate and regular rhythm  Pulses:           Radial pulses are 2+ on the right side and 2+ on the left side  Heart sounds: No murmur heard       Pulmonary:      Effort: Pulmonary effort is normal  No respiratory distress  Breath sounds: Normal breath sounds  No wheezing, rhonchi or rales  Chest:      Chest wall: No tenderness  Abdominal:      Palpations: Abdomen is soft  Tenderness: There is no abdominal tenderness  Musculoskeletal:      Cervical back: Normal range of motion and neck supple  Right lower leg: No tenderness  No edema  Left lower leg: No tenderness  No edema  Skin:     General: Skin is warm and dry  Neurological:      General: No focal deficit present  Mental Status: He is alert  Cranial Nerves: No cranial nerve deficit  Motor: No weakness  Psychiatric:         Mood and Affect: Mood is anxious           ED Medications  Medications   sodium chloride 0 9 % infusion (0 mL/hr Intravenous Stopped 7/16/21 2345)   ticagrelor (BRILINTA) tablet (180 mg Oral Given 7/16/21 0920)   heparin (porcine) injection (4,000 Units Intravenous Given 7/16/21 0921)   sodium chloride 0 9 % infusion (0 mL/hr  Stopped 7/16/21 2345)   fentanyl citrate (PF) 100 MCG/2ML (50 mcg Intravenous Given 7/16/21 0943)   midazolam (VERSED) injection (2 mg Intravenous Given 7/16/21 0943)   lidocaine (PF) (XYLOCAINE-MPF) 1 % injection (10 mL Infiltration Given 7/16/21 0943)   heparin (porcine) injection (3,000 Units Intravenous Given 7/16/21 0946)   heparin (porcine) injection (3,000 Units Intravenous Given 7/16/21 0958)   nitroGLYcerin (TRIDIL) 50 mg in 250 mL infusion (premix) (200 mcg Other Given 7/16/21 1028)   verapamil (ISOPTIN) injection (100 mcg Other Given 7/16/21 1021)   iohexol (OMNIPAQUE) 350 MG/ML injection (SINGLE-DOSE) (110 mL Intravenous Given 7/16/21 1045)   fentanyl citrate (PF) 100 MCG/2ML (50 mcg Intravenous Given 7/16/21 1042)       Diagnostic Studies  Results Reviewed     Procedure Component Value Units Date/Time    Comprehensive metabolic panel [[id number]]  (Abnormal) Collected: 07/16/21 0941    Lab Status: Final result Specimen: Blood from Arm, Left RDW 12 5 %      MPV 10 3 fL      Platelets 614 Thousands/uL      nRBC 0 /100 WBCs      Neutrophils Relative 43 %      Immat GRANS % 0 %      Lymphocytes Relative 44 %      Monocytes Relative 8 %      Eosinophils Relative 4 %      Basophils Relative 1 %      Neutrophils Absolute 3 46 Thousands/µL      Immature Grans Absolute 0 01 Thousand/uL      Lymphocytes Absolute 3 68 Thousands/µL      Monocytes Absolute 0 62 Thousand/µL      Eosinophils Absolute 0 32 Thousand/µL      Basophils Absolute 0 05 Thousands/µL                  No orders to display         Procedures  ECG 12 Lead Documentation Only    Date/Time: 7/16/2021 9:32 AM  Performed by: Spenser Herrera MD  Authorized by: Spenser Herrera MD     Indications / Diagnosis:  CP  Patient location:  ED  Interpretation:     Interpretation: abnormal    Rate:     ECG rate assessment: normal    Rhythm:     Rhythm: sinus rhythm    Ectopy:     Ectopy: none    QRS:     QRS axis:  Normal  Conduction:     Conduction: normal    ST segments:     ST segments:  Abnormal    Elevation:  II, III, aVF, V5 and V6    Depression:  I, aVL, V2 and V3  T waves:     T waves: normal    Comments:      NSR, acute STEMI in inferolateral leads           ED Course               Identification of Seniors at Risk      Most Recent Value   (ISAR) Identification of Seniors at Risk   Before the illness or injury that brought you to the Emergency, did you need someone to help you on a regular basis? 0 Filed at: 07/16/2021 0931   In the last 24 hours, have you needed more help than usual?  0 Filed at: 07/16/2021 2796   Have you been hospitalized for one or more nights during the past 6 months? 0 Filed at: 07/16/2021 0931   In general, do you see well?  0 Filed at: 07/16/2021 0931   In general, do you have serious problems with your memory? 0 Filed at: 07/16/2021 6708   Do you take more than three different medications every day?   1 Filed at: 07/16/2021 0931   ISAR Score  1 Filed at: 07/16/2021 7992 MDM  Number of Diagnoses or Management Options  Chest pain  STEMI (ST elevation myocardial infarction) [facility])  Diagnosis management comments: 65-year-old male presenting for acute onset of chest pain, found to have ST-elevation myocardial infarction on pre-hospital EKG  Cardiology alerted 10 minutes prior to arrival and sent ECG  Upon arrival to the emergency department, patient hemodynamically stable  Did receive pre-hospital aspirin  Will give Brilinta, heparin  Patient did receive 3 sublingual nitro prior to arrival with improvement in his pain as well as improvement in his ECG findings  After arrival in the emergency department, patient transferred to cath lab without incident  Disposition  Final diagnoses:   STEMI (ST elevation myocardial infarction) ([facility] )   Chest pain     Time reflects when diagnosis was documented in both MDM as applicable and the Disposition within this note     Time User Action Codes Description Comment    7/16/2021 12:35 PM Nena Arora Add [I21 21] ST elevation myocardial infarction involving left circumflex coronary artery ([facility] )     7/18/2021  9:50 AM Luisito York Add [I25 10,  Z98 61] CAD S/P percutaneous coronary angioplasty     7/19/2021 11:35 AM Cristin Payne Add [I21 3] STEMI (ST elevation myocardial infarction) ([facility] )     7/19/2021 11:35 AM Cristin Payne Add [R07 9] Chest pain       ED Disposition     ED Disposition Condition Date/Time Comment    Send to Cath Lab  Mon Jul 19, 2021 11:35 AM       Follow-up Information     Follow up With Specialties Details Why Contact Info    Dr Rohini Solorzano  Follow up Please go to follow up appt with your primary cardiologist Dr Live Perea on 7/28/2021 at 2:30pm Please arrive 15min prior to your appt and bring a list of your current medications    Cardiologist  Eddie [place]  [phone]          Discharge Medication List as of 7/18/2021 10:11 AM      START taking these medications    Details losartan (COZAAR) 25 mg tablet Take 1 tablet (25 mg total) by mouth daily, Starting Mon 7/19/2021, Normal      nitroglycerin (NITROSTAT) 0 4 mg SL tablet Place 1 tablet (0 4 mg total) under the tongue every 5 (five) minutes as needed for chest pain, Starting Sun 7/18/2021, Normal      ticagrelor (BRILINTA) 90 MG Take 1 tablet (90 mg total) by mouth every 12 (twelve) hours, Starting Fri 7/16/2021, Normal         CONTINUE these medications which have NOT CHANGED    Details   aspirin 81 mg chewable tablet Chew 81 mg daily, Historical Med      atorvastatin (LIPITOR) 80 mg tablet Take 80 mg by mouth daily, Historical Med      co-enzyme Q-10 30 MG capsule Take 100 mg by mouth daily, Historical Med      levothyroxine 25 mcg tablet Take 75 mcg by mouth daily , Historical Med      Multiple Vitamin (MULTIVITAMIN) capsule Take 1 capsule by mouth daily, Historical Med      Cranberry 500 MG CAPS Take 500 mg by mouth daily, Historical Med      diphenhydrAMINE (BENADRYL) 25 mg tablet Take 2 tablets (50 mg total) by mouth every 8 (eight) hours as needed for itching, Starting Mon 12/17/2018, Print      EPINEPHrine (EPIPEN) 0 3 mg/0 3 mL SOAJ Inject 0 3 mL (0 3 mg total) into a muscle once for 1 dose, Starting Mon 12/17/2018, Print      ezetimibe (ZETIA) 10 mg tablet Take 10 mg by mouth daily, Historical Med      metoprolol tartrate (LOPRESSOR) 25 mg tablet Take 25 mg by mouth every 12 (twelve) hours, Historical Med      Omega-3 Fatty Acids (FISH OIL) 1,000 mg Take 1,000 mg by mouth daily, Historical Med           Outpatient Discharge Orders   Basic metabolic panel   Standing Status: Future Standing Exp  Date: 07/18/22     Ambulatory  Referral to Cardiac Rehabilitation   Standing Status: Future Standing Exp  Date: 07/18/22      Ambulatory referral to Cardiology   Standing Status: Future Standing Exp  Date: 07/18/22      Ambulatory  Referral to Cardiac Rehabilitation   Standing Status: Future Standing Exp   Date: 07/18/22 Discharge Diet     Lifting restrictions: 10 Pounds     Other restrictions (specify):     Call provider for:  temperature >100 4     Call provider for:  persistent nausea or vomiting     Call provider for:  severe uncontrolled pain     Call provider for:  redness, tenderness, or signs of infection (pain, swelling, redness, odor or green/yellow discharge around incision site)     Call provider for:  difficulty breathing, headache or visual disturbances     Call provider for:  hives     Call provider for:  persistent dizziness or light-headedness     Call provider for:  extreme fatigue     Wash puncture site with soap and water, dry, apply new bandaid for 5 days     No hot tubs, tub baths or swimming for 5 days  PDMP Review     None           ED Provider  Attending physically available and evaluated Umu Early  I managed the patient along with the ED Attending      Electronically Signed by         Jerardo Francois MD  07/19/21 6420

## 2021-07-18 NOTE — DISCHARGE INSTRUCTIONS
1  Please see the post cardiac catheterization/ angioseal closure device instructions and stent booklet  No heavy lifting, greater than 10 lbs  or strenuous  activity for 48 hrs  See the post cardiac catheterization/ angioseal closure device instructions  2 Remove band aid tomorrow  Shower and wash area- groin gently with soap and water- beginning tomorrow  Rinse and pat dry  Apply new water seal band aid  Repeat this process for 5 days  No powders, creams lotions or antibiotic ointments  for 5 days  No tub baths, hot tubs or swimming for 5 days  3 No driving for 3 days    4  Do not stop aspirin or Brilinta (ticagrelor) for any reason without a cardiologists consent, or the stent could block up and cause a heart attack  Coronary Intravascular Stent Placement   WHAT YOU SHOULD KNOW:   Coronary intravascular stent placement is a procedure to place a stent in an artery of your heart that has plaque buildup  Plaque is a mixture of fat and cholesterol  A stent is a small mesh tube made of metal that helps keep your artery open  Your caregiver may place a bare metal stent or a drug-eluting stent (HARIKA) in your artery  A HARIKA is coated with medicine that is slowly released and helps prevent more plaque buildup in the area where the stent is placed  The stent remains in your artery for life  You may need more than one stent  CARE AGREEMENT:   You have the right to help plan your care  Learn about your health condition and how it may be treated  Discuss treatment options with your caregivers to decide what care you want to receive  You always have the right to refuse treatment  RISKS:   · You may develop a hematoma (swelling caused by collection of blood) or bleed more than expected from your catheter site  The dye used during this procedure may cause an allergic reaction or kidney problems  You may develop an infection  · Your artery may be injured when the catheter is inserted   During or after surgery, blood clots may form, or plaque may break off  The blood clot or plaque may block your artery and cause a heart attack or stroke  The stent could collapse, or a clot could form on the stent  This could cause the artery to become blocked again  You may need another procedure to open your artery  If you do not have this procedure, plaque may continue to build up in your arteries  This can cause a heart attack or stroke  WHILE YOU ARE HERE:   Before the procedure:   · Informed consent  is a legal document that explains the tests, treatments, or procedures that you may need  Informed consent means you understand what will be done and can make decisions about what you want  You give your permission when you sign the consent form  You can have someone sign this form for you if you are not able to sign it  You have the right to understand your medical care in words you know  Before you sign the consent form, understand the risks and benefits of what will be done  Make sure all your questions are answered  · Blood tests  are done to give caregivers information about how your body is working  The blood may be taken from your hand, arm, or IV  · Heart monitoring  is also called an ECG or EKG  Sticky pads placed on your skin record your heart's electrical activity  · An IV  is a small tube placed in your vein that is used to give you medicine or liquids  · A sedative  will be given to you right before the procedure  This medicine may make you feel sleepy and more relaxed  · Blood thinner medicine  will be given to prevent clots from forming during and after the procedure  During the procedure:   · You will receive local anesthesia that will numb the area where the catheter will be placed  You will be awake during the procedure so that your caregivers can give you instructions  You may be asked to cough or hold your breath during the procedure       · A catheter (long, thin tube) is put into an artery, usually in your groin  The catheter is gently guided through this artery to your heart and into the narrowed or blocked artery  Caregivers will use x-rays and dye to find the area where the stent needs to be placed  You may feel warm as the dye is put into the catheter  A guidewire is then placed into the catheter  The balloon catheter is guided into the narrow or blocked artery using the guidewire  Caregivers inflate the balloon several times for short periods  The inflated balloon pushes the plaque against the artery walls  This opens them and allows more blood flow to your heart  Another balloon catheter with a stent is then inserted into the artery  The balloon is inflated  This expands the stent and pushes it into place against the artery wall  The stent will be left in your artery to help keep it open  After the procedure: The catheter and guidewire will be taken out of the artery and a pressure bandage will be put on the area  Your caregiver may apply a collagen plug or other closure device to stop the bleeding  Caregivers will put pressure on the bandaged area to help stop bleeding  They may use their fingers or a mechanical device to apply the pressure  You will be taken to a room to rest  Caregivers will monitor you closely for any problems  When your caregiver sees that you are okay, you will be taken to your hospital room  You may need to lie still and flat for 3 to 6 hours after the procedure to prevent bleeding  Do not get out of bed  until your caregiver says it is okay  © 2014 [address] [employer] is for End User's use only and may not be sold, redistributed or otherwise used for commercial purposes  All illustrations and images included in CareNotes® are the copyrighted property of [employer] , [employer]  or Jose Luis Chávez  The above information is an  only  It is not intended as medical advice for individual conditions or treatments   Talk to your doctor, nurse or pharmacist before following any medical regimen to see if it is safe and effective for you

## 2021-07-18 NOTE — CASE MANAGEMENT
Cm received consult to price check Paymo sent to [facility]  Spoke with Mahamed Gentile and pt has a copay of $[id number] which is due to deductible, he qualifies for free 30 days   Cm made pt aware and he is agreeable to cost  Will d/c home today

## 2021-07-19 VITALS
WEIGHT: 143.96 LBS | BODY MASS INDEX: 21.82 KG/M2 | SYSTOLIC BLOOD PRESSURE: 113 MMHG | RESPIRATION RATE: 18 BRPM | HEART RATE: 72 BPM | HEIGHT: 68 IN | OXYGEN SATURATION: 98 % | TEMPERATURE: 97.8 F | DIASTOLIC BLOOD PRESSURE: 58 MMHG

## 2021-08-02 ENCOUNTER — TELEPHONE (OUTPATIENT)
Dept: CARDIOLOGY CLINIC | Facility: CLINIC | Age: 76
End: 2021-08-02

## 2021-08-02 NOTE — TELEPHONE ENCOUNTER
Patient is calling back to let the office know that he spoke with his Cardiologist in [facility] and he stated that he was supposed to keep taking the medications that were prescribed

## 2021-08-02 NOTE — TELEPHONE ENCOUNTER
Patient called to schedule a Hospital follow up  He also told me that he saw Dr Walker Soto since being in the Hospital   We do not need a Hospital F/U per MA  He then called back to ask if he can stop taking Medication that was given in the ER  I asked if he informed his Cardiologist of the Medication changes and he said "NO"    Myself and then an MA spoke to him urging him to contact his Cardiologist, Dr Walker Soto in [place], [place] today to inform them of all medications he is taking after being at [address] to make sure they are aware

## 2021-08-04 ENCOUNTER — CLINICAL SUPPORT (OUTPATIENT)
Dept: CARDIAC REHAB | Facility: HOSPITAL | Age: 76
End: 2021-08-04
Payer: MEDICARE

## 2021-08-04 DIAGNOSIS — I25.10 CAD S/P PERCUTANEOUS CORONARY ANGIOPLASTY: ICD-10-CM

## 2021-08-04 DIAGNOSIS — Z98.61 CAD S/P PERCUTANEOUS CORONARY ANGIOPLASTY: ICD-10-CM

## 2021-08-04 DIAGNOSIS — I21.3 ST ELEVATION MYOCARDIAL INFARCTION (STEMI), UNSPECIFIED ARTERY (HCC): ICD-10-CM

## 2021-08-04 DIAGNOSIS — I21.21 ST ELEVATION MYOCARDIAL INFARCTION INVOLVING LEFT CIRCUMFLEX CORONARY ARTERY (HCC): ICD-10-CM

## 2021-08-04 PROCEDURE — 93797 PHYS/QHP OP CAR RHAB WO ECG: CPT

## 2021-08-04 NOTE — PROGRESS NOTES
Cardiac Rehabilitation Plan of Care   Initial Care Plan          Today's date: 2021   # of Exercise Sessions Completed: 1-evaluation  Patient name: Nishant Owen      : 1945  Age: 67 y o  MRN: [id number]  Referring Physician: Jose Angel Davis MD  Cardiologist: Dr Live Perea  Provider: Princess  Clinician: Marleny Carlson MS, CEP      Dx:   Encounter Diagnoses   Name Primary?  ST elevation myocardial infarction involving left circumflex coronary artery (HCC)     CAD S/P percutaneous coronary angioplasty      Date of onset: 2021      SUMMARY OF PROGRESS:  Mr Soto Nguyen is here today for his cardiac rehabilitation evaluation after recent MI and stenting  He reports he has has had significant history with previous Mis and CABG and stents  He reports he is doing well at this time  He does not feel he has any limitations  He would like to be able to mow his lawn using push mower and become more active to help improve his heart health  He denies depression or anxiety at this time (PHQ-9= 0, HOMAR-7=0)  He reports he has great support from his daughter  He completed TM ETT today reaching 4 3 METS at 8:00 min  He tolerated exercise well with stable hemodynamic response to exercise  He is in agreement to attend cardiac rehab sessions 3x/week for 12 weeks or 36 sessions  He will be starting his sessions tomorrow 2021        Medication compliance: Yes   Comments: Pt reports to be compliant with medications  Fall Risk: Low   Comments: Ambulates with a steady gait with no assist device    EKG Interpretation: NSR, PVCs, PACs      EXERCISE ASSESSMENT and PLAN    Current Exercise Program in Rehab:       Frequency: 3 days/week Supplement with home exercise 2+ days/wk as tolerated       Minutes: 30-40         METS: 3 0-3 5            HR: 30 beats above resting   RPE: 4-6         Modalities: Treadmill, Airdyne bike, UBE, NuStep and Recumbent bike      Exercise Progression 30 Day Goals :    Frequency: 3 days/week of cardiac rehab     Supplement with home exercise 2+ days/wk as tolerated    Minutes: 40                            >150 mins/wk of moderate intensity exercise   METS: 3 5   HR: 30 beats above resting    RPE: 4-6   Modalities: Treadmill, Airdyne bike, UBE, NuStep and Recumbent bike    Strength trainin-3 days / week  12-15 repetitions  1-2 sets per modality   Will be added following 2-3 weeks of monitored exercise sessions   Modalities: Pull Downs, Lateral Raise, Arm Extension, Arm Curl and Front Raises    Home Exercise: none    Goals: 10% improvement in functional capacity - based on max METs achieved in fitness assessment, improved DASI score by 10%, Increase in exercise capacity by 40% - based on peak METs tolerated in cardiac rehab exercise session, Exercise 5 days/wk, >150mins/wk of moderate intensity exercise, Resume ADLs with increased strength, Attend Rehab regularly, Decrease sitting time and Start a walking program    Progression Toward Goals:  Reviewed Pt goals and determined plan of care    Education: benefit of exercise for CAD risk factors, home exercise guidelines, AHA guidelines to achieve >150 mins/wk of moderate exercise and RPE scale   Plan:education on home exercise guidelines, home exercise 30+ mins 2 days opposite CR and Education class: Risk Factors for Heart Disease  Readiness to change: Preparation:  (Getting ready to change)       NUTRITION ASSESSMENT AND PLAN    Weight control:    Starting weight: 136 lb   Current weight:     Waist circumference:    Startin 5"   Current:      Diabetes: N/A  A1c: 7 0    last measured: 2021    Lipid management: Discussed diet and lipid management and Last lipid profile 2021  Chol 155  TRG 46  HDL 59  LDL 87    Goals:LDL <100, HDL >40, TRG <150 and CHOL <200    Progression Toward Goals: Reviewed Pt goals and determined plan of care    Education: heart healthy eating  low sodium diet  hydration  nutrition for  lipid management  target goal for A1c <7 0  Plan: Education class: Reading Food Labels and Education Class: Heart Healthy Eating  Readiness to change: Action:  (Changing behavior)      PSYCHOSOCIAL ASSESSMENT AND PLAN    Emotional:  Depression assessment:  PHQ-9 = 0 =No Depression            Anxiety measure:  HOMAR-7 = 0-4  = Not anxious  Self-reported stress level:  3  Social support: Excellent    Goals:  [facility] F[facility] in [facility] Score < 3, Daily Activity in [facility] Score < 3, Social Activities in [facility] Score < 3, [facility] of [facility] in [facility] Score < 3 , Increased interest in doing things and increased energy    Progression Toward Goals: Reviewed Pt goals and determined plan of care    Education: signs/sxs of depression, benefits of a positive support system and stress management techniques  Plan: Class: Stress and Your Health and Class: Relaxation  Readiness to change: Action:  (Changing behavior)      OTHER CORE COMPONENTS     Tobacco:   Social History     Tobacco Use   Smoking Status Former Smoker    Types: Cigarettes    Quit date: 1    Years since quittin 6   Smokeless Tobacco Never Used       Tobacco Use Intervention:   N/A:  Patient is a non-smoker     Anginal Symptoms:  None   NTG use: No prescription    Blood pressure:    Restin/70   Exercise: 132/72    Goals: consistent BP < 130/80, reduced dietary sodium <2300mg, moderate intensity exercise >150 mins/wk and medication compliance    Progression Toward Goals: Reviewed Pt goals and determined plan of care    Education:  understanding high blood pressure and it's relationship to CAD and low sodium diet and HTN  Plan: Class: Understanding Heart Disease and Class: Common Heart Medications  Readiness to change: Action:  (Changing behavior)

## 2021-08-04 NOTE — PROGRESS NOTES
CARDIAC REHAB ASSESSMENT    Today's date: 2021  Patient name: Nishant Owen     : 1945       MRN: [id number]  PCP: Michelle Wilks MD  Referring Physician: Jose Angel Davis MD  Cardiologist: Dr Live Kamara)  Surgeon: n/a  Dx:   Encounter Diagnoses   Name Primary?  ST elevation myocardial infarction involving left circumflex coronary artery (HCC)     CAD S/P percutaneous coronary angioplasty        Date of onset: 2021  Cultural needs: n/a    Weight:  146 lb     Height:   Ht Readings from Last 1 Encounters:   21 5' 8" (1 727 m)     Medical History:   Past Medical History:   Diagnosis Date    Cardiac disease     [facility]congestive [facility] failure) ([facility] )     Coronary artery disease     Disease of thyroid gland     Hematuria     Hyperlipidemia     Hypertension     Renal calculi     Type 2 diabetes mellitus ([facility] ) 2021    UTI (urinary tract infection)          Physical Limitations: none    Fall Risk: Low   Comments: Ambulates with a steady gait with no assist device    Anginal Equivalent: None/denies angina   NTG use: No prescription    Risk Factors   Cholesterol: Yes  Smoking: Former user  HTN: No  DM: No  Obesity: No   Inactivity: Yes  Stress:  perceived  stress: 3/10   Stressors:reports minimal stress   Goals for Stress Management:Practice Relaxation Techniques, Keep a positive mindset and Enjoy a hobby    Family History:  Family History   Problem Relation Age of Onset    Heart disease Mother     Hypertension Mother     Lung cancer Father         smoker       Allergies: Patient has no known allergies    ETOH:   Social History     Substance and Sexual Activity   Alcohol Use No    Comment: socially         Current Medications:   Current Outpatient Medications   Medication Sig Dispense Refill    aspirin 81 mg chewable tablet Chew 81 mg daily      atorvastatin (LIPITOR) 80 mg tablet Take 80 mg by mouth daily      co-enzyme Q-10 30 MG capsule Take 100 mg by mouth daily      Cranberry 500 MG CAPS Take 500 mg by mouth daily      diphenhydrAMINE (BENADRYL) 25 mg tablet Take 2 tablets (50 mg total) by mouth every 8 (eight) hours as needed for itching (Patient not taking: Reported on 8/12/2019) 20 tablet 0    EPINEPHrine (EPIPEN) 0 3 mg/0 3 mL SOAJ Inject 0 3 mL (0 3 mg total) into a muscle once for 1 dose 2 each 0    ezetimibe (ZETIA) 10 mg tablet Take 10 mg by mouth daily      levothyroxine 25 mcg tablet Take 75 mcg by mouth daily       losartan (COZAAR) 25 mg tablet Take 1 tablet (25 mg total) by mouth daily 30 tablet 11    metoprolol tartrate (LOPRESSOR) 25 mg tablet Take 25 mg by mouth every 12 (twelve) hours      Multiple Vitamin (MULTIVITAMIN) capsule Take 1 capsule by mouth daily      nitroglycerin (NITROSTAT) 0 4 mg SL tablet Place 1 tablet (0 4 mg total) under the tongue every 5 (five) minutes as needed for chest pain 30 tablet 0    Omega-3 Fatty Acids (FISH OIL) 1,000 mg Take 1,000 mg by mouth daily      ticagrelor (BRILINTA) 90 MG Take 1 tablet (90 mg total) by mouth every 12 (twelve) hours 60 tablet 11     No current facility-administered medications for this visit  Functional Status Prior to Diagnosis for Treatment   Occupation: retired  Recreation: none  ADLs: No limitations  [place]: No limitations  Exercise: no regular exercise  Other: n/a    Current Functional Status  Occupation: retired  Recreation: none  ADLs: No limitations  [place]: No limitations  Exercise: no regular exercise  Other: n/a    Patient Specific Goals:   Increase strength and endurance with exercise to be able to walk more and become more active to help improve heart health    Short Term Program Goals: dietary modifications increased strength improved energy/stamina with ADLs exercise 120-150 mins/wk    Long Term Goals: increased maximal walking duration  increased intial training workload  Improved [facility] Activity Status score  Improved functional capacity  Improved Quality of Life - [facility] score reduced  Improved lipid profile    Ability to reach goals/rehabilitation potential:  Very Good     Projected return to function: 12 weeks  Objective tests: sub-max TM ETT      Nutritional   Reviewed details of Rate your Plate  Discussed key elements of heart healthy eating  Reviewed patient goals for dietary modifications and their clinical implications  Reviewed most recent lipid profile       Goals for dietary modification: choose lean cuts of meat  poultry without the skin  low fat ground meat and poultry  eliminate processed meats  reduce portions of meat to 3 oz  increase fish intake  more meatless meals  low fat dairy   reduced fat cheese  increase whole grains  increase fruits and vegetables  eliminate butter  low sodium  improved snack choices  more nuts/seeds      Emotional/Social  Patient reports he/she is coping well with good social support and denies depression or anxiety  Reports sufficient emotional support    Marital status:     Domestic Violence Screening: No    Comments: n/a

## 2021-08-05 ENCOUNTER — CLINICAL SUPPORT (OUTPATIENT)
Dept: CARDIAC REHAB | Facility: HOSPITAL | Age: 76
End: 2021-08-05
Payer: MEDICARE

## 2021-08-05 DIAGNOSIS — I21.3 ST ELEVATION MYOCARDIAL INFARCTION (STEMI), UNSPECIFIED ARTERY (HCC): ICD-10-CM

## 2021-08-05 PROCEDURE — 93798 PHYS/QHP OP CAR RHAB W/ECG: CPT

## 2021-08-06 ENCOUNTER — CLINICAL SUPPORT (OUTPATIENT)
Dept: CARDIAC REHAB | Facility: HOSPITAL | Age: 76
End: 2021-08-06
Payer: MEDICARE

## 2021-08-06 DIAGNOSIS — I21.3 ST ELEVATION MYOCARDIAL INFARCTION (STEMI), UNSPECIFIED ARTERY (HCC): ICD-10-CM

## 2021-08-06 PROCEDURE — 93798 PHYS/QHP OP CAR RHAB W/ECG: CPT

## 2021-08-10 ENCOUNTER — CLINICAL SUPPORT (OUTPATIENT)
Dept: CARDIAC REHAB | Facility: HOSPITAL | Age: 76
End: 2021-08-10
Payer: MEDICARE

## 2021-08-10 DIAGNOSIS — I21.3 ST ELEVATION MYOCARDIAL INFARCTION (STEMI), UNSPECIFIED ARTERY (HCC): ICD-10-CM

## 2021-08-10 PROCEDURE — 93798 PHYS/QHP OP CAR RHAB W/ECG: CPT

## 2021-08-12 ENCOUNTER — CLINICAL SUPPORT (OUTPATIENT)
Dept: CARDIAC REHAB | Facility: HOSPITAL | Age: 76
End: 2021-08-12
Payer: MEDICARE

## 2021-08-12 DIAGNOSIS — I21.3 ST ELEVATION MYOCARDIAL INFARCTION (STEMI), UNSPECIFIED ARTERY (HCC): ICD-10-CM

## 2021-08-12 PROCEDURE — 93798 PHYS/QHP OP CAR RHAB W/ECG: CPT

## 2021-08-17 ENCOUNTER — APPOINTMENT (OUTPATIENT)
Dept: CARDIAC REHAB | Facility: HOSPITAL | Age: 76
End: 2021-08-17
Payer: MEDICARE

## 2021-08-17 ENCOUNTER — CLINICAL SUPPORT (OUTPATIENT)
Dept: CARDIAC REHAB | Facility: HOSPITAL | Age: 76
End: 2021-08-17
Payer: MEDICARE

## 2021-08-17 DIAGNOSIS — I21.3 ST ELEVATION MYOCARDIAL INFARCTION (STEMI), UNSPECIFIED ARTERY (HCC): ICD-10-CM

## 2021-08-17 PROCEDURE — 93798 PHYS/QHP OP CAR RHAB W/ECG: CPT

## 2021-08-19 ENCOUNTER — CLINICAL SUPPORT (OUTPATIENT)
Dept: CARDIAC REHAB | Facility: HOSPITAL | Age: 76
End: 2021-08-19
Payer: MEDICARE

## 2021-08-19 DIAGNOSIS — I21.3 ST ELEVATION MYOCARDIAL INFARCTION (STEMI), UNSPECIFIED ARTERY (HCC): ICD-10-CM

## 2021-08-19 PROCEDURE — 93798 PHYS/QHP OP CAR RHAB W/ECG: CPT

## 2021-08-20 ENCOUNTER — CLINICAL SUPPORT (OUTPATIENT)
Dept: CARDIAC REHAB | Facility: HOSPITAL | Age: 76
End: 2021-08-20
Payer: MEDICARE

## 2021-08-20 DIAGNOSIS — I21.3 ST ELEVATION MYOCARDIAL INFARCTION (STEMI), UNSPECIFIED ARTERY (HCC): ICD-10-CM

## 2021-08-20 PROCEDURE — 93798 PHYS/QHP OP CAR RHAB W/ECG: CPT

## 2021-08-24 ENCOUNTER — CLINICAL SUPPORT (OUTPATIENT)
Dept: CARDIAC REHAB | Facility: HOSPITAL | Age: 76
End: 2021-08-24
Payer: MEDICARE

## 2021-08-24 DIAGNOSIS — I21.3 ST ELEVATION MYOCARDIAL INFARCTION (STEMI), UNSPECIFIED ARTERY (HCC): ICD-10-CM

## 2021-08-24 PROCEDURE — 93798 PHYS/QHP OP CAR RHAB W/ECG: CPT

## 2021-08-26 ENCOUNTER — CLINICAL SUPPORT (OUTPATIENT)
Dept: CARDIAC REHAB | Facility: HOSPITAL | Age: 76
End: 2021-08-26
Payer: MEDICARE

## 2021-08-26 DIAGNOSIS — I21.3 ST ELEVATION MYOCARDIAL INFARCTION (STEMI), UNSPECIFIED ARTERY (HCC): ICD-10-CM

## 2021-08-26 PROCEDURE — 93798 PHYS/QHP OP CAR RHAB W/ECG: CPT

## 2021-08-27 ENCOUNTER — CLINICAL SUPPORT (OUTPATIENT)
Dept: CARDIAC REHAB | Facility: HOSPITAL | Age: 76
End: 2021-08-27
Payer: MEDICARE

## 2021-08-27 DIAGNOSIS — I21.3 ST ELEVATION MYOCARDIAL INFARCTION (STEMI), UNSPECIFIED ARTERY (HCC): ICD-10-CM

## 2021-08-27 PROCEDURE — 93798 PHYS/QHP OP CAR RHAB W/ECG: CPT

## 2021-08-31 ENCOUNTER — CLINICAL SUPPORT (OUTPATIENT)
Dept: CARDIAC REHAB | Facility: HOSPITAL | Age: 76
End: 2021-08-31
Payer: MEDICARE

## 2021-08-31 DIAGNOSIS — I21.3 ST ELEVATION MYOCARDIAL INFARCTION (STEMI), UNSPECIFIED ARTERY (HCC): ICD-10-CM

## 2021-08-31 PROCEDURE — 93798 PHYS/QHP OP CAR RHAB W/ECG: CPT

## 2021-08-31 NOTE — PROGRESS NOTES
Cardiac Rehabilitation Plan of Care   30 Day Reassessment          Today's date: 2021   # of Exercise Sessions Completed: 12  Patient name: Faviola Chaves      : 1945  Age: 67 y o  MRN: [id number]  Referring Physician: Bipin Wong MD  Cardiologist: Dr Zenaida Noble Riser)  Provider: [address] [place] [address]  Clinician: Abraham Angel MS, CEP      Dx:   Encounter Diagnosis   Name Primary?  ST elevation myocardial infarction (STEMI), unspecified artery [facility])      Date of onset: 2021      SUMMARY OF PROGRESS:  Mr Fab Kwok has started his cardiac rehabilitation program after recent MI and stenting  He reports he has has had significant history with previous MIs and CABG and stents  He reports he is doing well at this time  He does not feel he has any limitations  He would like to be able to mow his lawn using push mower and become more active to help improve his heart health  He reports he is on target with his goal at 30 days  He is noticing increased leg strength and overall endurance with activity  He is happy to be moving and not just sitting around all the time at home  He has not tried to mow his lawn yet  He denies depression or anxiety at this time (PHQ-9= 0, HOMAR-7=0)  He reports he has great support from his daughter  No concerns at 30 days  He is currently completing 35-40 min of aerobic exercise during his cardiac rehab sessions at 3 1 METs  Telemetry shows sinus tobin at rest  He has been tolerating exercise well and had been doing well progressing his exercise times and intensities  Will continue to progress over next 30 days of rehab  Will also encourage home exercise on non-rehab days 15-20 min  He participated in group education sessions on cardiac risk factors weekly          Medication compliance: Yes   Comments: Pt reports to be compliant with medications  Fall Risk: Low   Comments: Ambulates with a steady gait with no assist device    EKG Interpretation: NSR, PVCs, PACs      EXERCISE ASSESSMENT and PLAN    Current Exercise Program in Rehab:       Frequency: 3 days/week Supplement with home exercise 2+ days/wk as tolerated       Minutes: 35-40        METS: 3 1            HR: 30 beats above resting   RPE: 4-6         Modalities: Treadmill, Airdyne bike, UBE, NuStep and Recumbent bike      Exercise Progression 30 Day Goals :    Frequency: 3 days/week of cardiac rehab     Supplement with home exercise 2+ days/wk as tolerated    Minutes: 40-45                            >150 mins/wk of moderate intensity exercise   METS: 3 1-3 5   HR: 30 beats above resting    RPE: 4-6   Modalities: Treadmill, Airdyne bike, UBE, NuStep and Recumbent bike    Strength trainin-3 days / week  12-15 repetitions  1-2 sets per modality   Will be added following 2-3 weeks of monitored exercise sessions   Modalities: Pull Downs, Lateral Raise, Arm Extension, Arm Curl and Front Raises    Home Exercise: none    Goals: 10% improvement in functional capacity - based on max METs achieved in fitness assessment, improved DASI score by 10%, Increase in exercise capacity by 40% - based on peak METs tolerated in cardiac rehab exercise session, Exercise 5 days/wk, >150mins/wk of moderate intensity exercise, Resume ADLs with increased strength, Attend Rehab regularly, Decrease sitting time and Start a walking program    Progression Toward Goals:  Pt is progressing and showing improvement  toward the following goals:  attending cardiac rehab regularly 3x/week, increasing strength and endurance with exercise, increased functional capacity shown increased MET levels          Education: benefit of exercise for CAD risk factors, home exercise guidelines, AHA guidelines to achieve >150 mins/wk of moderate exercise and RPE scale   Plan:education on home exercise guidelines, home exercise 30+ mins 2 days opposite CR and Education class: Risk Factors for Heart Disease  Readiness to change: Preparation:  (Getting ready to change)       NUTRITION ASSESSMENT AND PLAN    Weight control:    Starting weight: 143 lb   Current weight:   148 lb  Waist circumference:    Startin 5"   Current:      Diabetes: N/A  A1c: 7 0    last measured: 2021    Lipid management: Discussed diet and lipid management and Last lipid profile 2021  Chol 155  TRG 46  HDL 59  LDL 87    Goals:LDL <100, HDL >40, TRG <150 and CHOL <200    Progression Toward Goals: Pt is progressing and showing improvement  toward the following goals:  following low fat diet, trying to increase fruits and vegetables daily          Education: heart healthy eating  low sodium diet  hydration  nutrition for  lipid management  target goal for A1c <7 0  Plan: Education class: Reading Food Labels and Education Class: Heart Healthy Eating  Readiness to change: Action:  (Changing behavior)      PSYCHOSOCIAL ASSESSMENT AND PLAN    Emotional:  Depression assessment:  PHQ-9 = 0 =No Depression            Anxiety measure:  HOMAR-7 = 0-4  = Not anxious  Self-reported stress level:  3  Social support: Excellent    Goals:  Physical Fitness in [place] Score < 3, Daily Activity in [facility] Score < 3, Social Activities in [place] Score < 3, Quality of Life in [facility] Score < 3 , Increased interest in doing things and increased energy    Progression Toward Goals: goal met no concerns with depression or anxiety    Education: signs/sxs of depression, benefits of a positive support system and stress management techniques  Plan: Class: Stress and Your Health and Class: Relaxation  Readiness to change: Action:  (Changing behavior)      OTHER CORE COMPONENTS     Tobacco:   Social History     Tobacco Use   Smoking Status Former Smoker    Types: Cigarettes    Quit date: [facility] Years since quittin 6   Smokeless Tobacco Never Used       Tobacco Use Intervention:   N/A:  Patient is a non-smoker     Anginal Symptoms:  None   NTG use: No prescription    Blood pressure:    Resting: 110//78   Exercise: 112//72    Goals: consistent BP < 130/80, reduced dietary sodium <2300mg, moderate intensity exercise >150 mins/wk and medication compliance    Progression Toward Goals: Pt is progressing and showing improvement  toward the following goals:  BP stable within normal resting limits          Education:  understanding high blood pressure and it's relationship to CAD and low sodium diet and HTN  Plan: Class: Understanding Heart Disease and Class: Common Heart Medications  Readiness to change: Action:  (Changing behavior)

## 2021-09-02 ENCOUNTER — CLINICAL SUPPORT (OUTPATIENT)
Dept: CARDIAC REHAB | Facility: HOSPITAL | Age: 76
End: 2021-09-02
Payer: MEDICARE

## 2021-09-02 DIAGNOSIS — I21.3 ST ELEVATION MYOCARDIAL INFARCTION (STEMI), UNSPECIFIED ARTERY (HCC): ICD-10-CM

## 2021-09-02 PROCEDURE — 93798 PHYS/QHP OP CAR RHAB W/ECG: CPT

## 2021-09-03 ENCOUNTER — CLINICAL SUPPORT (OUTPATIENT)
Dept: CARDIAC REHAB | Facility: HOSPITAL | Age: 76
End: 2021-09-03
Payer: MEDICARE

## 2021-09-03 DIAGNOSIS — I21.3 ST ELEVATION MYOCARDIAL INFARCTION (STEMI), UNSPECIFIED ARTERY (HCC): ICD-10-CM

## 2021-09-03 PROCEDURE — 93798 PHYS/QHP OP CAR RHAB W/ECG: CPT

## 2021-09-07 ENCOUNTER — CLINICAL SUPPORT (OUTPATIENT)
Dept: CARDIAC REHAB | Facility: HOSPITAL | Age: 76
End: 2021-09-07
Payer: MEDICARE

## 2021-09-07 DIAGNOSIS — I21.3 ST ELEVATION MYOCARDIAL INFARCTION (STEMI), UNSPECIFIED ARTERY (HCC): ICD-10-CM

## 2021-09-07 PROCEDURE — 93798 PHYS/QHP OP CAR RHAB W/ECG: CPT

## 2021-09-09 ENCOUNTER — CLINICAL SUPPORT (OUTPATIENT)
Dept: CARDIAC REHAB | Facility: HOSPITAL | Age: 76
End: 2021-09-09
Payer: MEDICARE

## 2021-09-09 DIAGNOSIS — I21.3 ST ELEVATION MYOCARDIAL INFARCTION (STEMI), UNSPECIFIED ARTERY (HCC): ICD-10-CM

## 2021-09-09 PROCEDURE — 93798 PHYS/QHP OP CAR RHAB W/ECG: CPT

## 2021-09-10 ENCOUNTER — CLINICAL SUPPORT (OUTPATIENT)
Dept: CARDIAC REHAB | Facility: HOSPITAL | Age: 76
End: 2021-09-10
Payer: MEDICARE

## 2021-09-10 DIAGNOSIS — I21.3 ST ELEVATION MYOCARDIAL INFARCTION (STEMI), UNSPECIFIED ARTERY (HCC): ICD-10-CM

## 2021-09-10 PROCEDURE — 93798 PHYS/QHP OP CAR RHAB W/ECG: CPT

## 2021-09-14 ENCOUNTER — CLINICAL SUPPORT (OUTPATIENT)
Dept: CARDIAC REHAB | Facility: HOSPITAL | Age: 76
End: 2021-09-14
Payer: MEDICARE

## 2021-09-14 DIAGNOSIS — I21.3 ST ELEVATION MYOCARDIAL INFARCTION (STEMI), UNSPECIFIED ARTERY (HCC): ICD-10-CM

## 2021-09-14 PROCEDURE — 93798 PHYS/QHP OP CAR RHAB W/ECG: CPT

## 2021-09-16 ENCOUNTER — CLINICAL SUPPORT (OUTPATIENT)
Dept: CARDIAC REHAB | Facility: HOSPITAL | Age: 76
End: 2021-09-16
Payer: MEDICARE

## 2021-09-16 DIAGNOSIS — I21.3 ST ELEVATION MYOCARDIAL INFARCTION (STEMI), UNSPECIFIED ARTERY (HCC): ICD-10-CM

## 2021-09-16 PROCEDURE — 93798 PHYS/QHP OP CAR RHAB W/ECG: CPT

## 2021-09-17 ENCOUNTER — CLINICAL SUPPORT (OUTPATIENT)
Dept: CARDIAC REHAB | Facility: HOSPITAL | Age: 76
End: 2021-09-17
Payer: MEDICARE

## 2021-09-17 DIAGNOSIS — I21.3 ST ELEVATION MYOCARDIAL INFARCTION (STEMI), UNSPECIFIED ARTERY (HCC): ICD-10-CM

## 2021-09-17 PROCEDURE — 93798 PHYS/QHP OP CAR RHAB W/ECG: CPT

## 2021-09-21 ENCOUNTER — CLINICAL SUPPORT (OUTPATIENT)
Dept: CARDIAC REHAB | Facility: HOSPITAL | Age: 76
End: 2021-09-21
Payer: MEDICARE

## 2021-09-21 DIAGNOSIS — I21.3 ST ELEVATION MYOCARDIAL INFARCTION (STEMI), UNSPECIFIED ARTERY (HCC): ICD-10-CM

## 2021-09-21 PROCEDURE — 93798 PHYS/QHP OP CAR RHAB W/ECG: CPT

## 2021-09-23 ENCOUNTER — CLINICAL SUPPORT (OUTPATIENT)
Dept: CARDIAC REHAB | Facility: HOSPITAL | Age: 76
End: 2021-09-23
Payer: MEDICARE

## 2021-09-23 DIAGNOSIS — I21.3 ST ELEVATION MYOCARDIAL INFARCTION (STEMI), UNSPECIFIED ARTERY (HCC): ICD-10-CM

## 2021-09-23 PROCEDURE — 93798 PHYS/QHP OP CAR RHAB W/ECG: CPT

## 2021-09-24 ENCOUNTER — CLINICAL SUPPORT (OUTPATIENT)
Dept: CARDIAC REHAB | Facility: HOSPITAL | Age: 76
End: 2021-09-24
Payer: MEDICARE

## 2021-09-24 DIAGNOSIS — I21.3 ST ELEVATION MYOCARDIAL INFARCTION (STEMI), UNSPECIFIED ARTERY (HCC): ICD-10-CM

## 2021-09-24 PROCEDURE — 93798 PHYS/QHP OP CAR RHAB W/ECG: CPT

## 2021-09-30 ENCOUNTER — APPOINTMENT (OUTPATIENT)
Dept: CARDIAC REHAB | Facility: HOSPITAL | Age: 76
End: 2021-09-30
Payer: MEDICARE

## 2021-09-30 NOTE — PROGRESS NOTES
Cardiac Rehabilitation Plan of Care   60 Day Reassessment          Today's date: 2021   # of Exercise Sessions Completed: 23  Patient name: Cherry Arzola      : 1945  Age: 67 y o  MRN: [id number]  Referring Physician: Crispin Maya MD  Cardiologist: Dr Eddie Jennings)  Provider: Princess  Clinician: Lucille Sharma MS, CEP      Dx:   Encounter Diagnosis   Name Primary?  ST elevation myocardial infarction (STEMI), unspecified artery [facility])      Date of onset: 2021      SUMMARY OF PROGRESS:  Mr Rhonda Medina has started his cardiac rehabilitation program after recent MI and stenting  He reports he has has had significant history with previous MIs and CABG and stents  He reports he is doing well at this time  He does not feel he has any limitations  He is currently completing 40-50 min of aerobic exercise plus light strength training program at 3 3-3 5 METs with stable hemodynamic response to exercise  Telemetry shows NSR, sinus tobin at rest  BP stable within normal resting range  He would like to be able to mow his lawn using push mower and become more active to help improve his heart health  He reports he is on target with his goal at 60 days  He is noticing increased leg strength and overall endurance with activity  He is happy to be moving and not just sitting around all the time at home  He is not ready to mow his lawn yet  He denies depression or anxiety at this time (PHQ-9= 0, HOMAR-7=0)  He reports he has great support from his daughter  No concerns at 30 days  Will continue to progress over next 30 days of rehab  Will also encourage home exercise on non-rehab days 15-20 min  He participated in group education sessions on cardiac risk factors weekly          Medication compliance: Yes   Comments: Pt reports to be compliant with medications  Fall Risk: Low   Comments: Ambulates with a steady gait with no assist device    EKG Interpretation: NSR, PVCs, PACs      EXERCISE ASSESSMENT and PLAN    Current Exercise Program in Rehab:       Frequency: 3 days/week Supplement with home exercise 2+ days/wk as tolerated       Minutes: 40-50        METS: 3 3-3 5            HR: 30 beats above resting   RPE: 4-6         Modalities: Treadmill, Airdyne bike, UBE, NuStep and Recumbent bike      Exercise Progression 30 Day Goals :    Frequency: 3 days/week of cardiac rehab     Supplement with home exercise 2+ days/wk as tolerated    Minutes: 40-50                            >150 mins/wk of moderate intensity exercise   METS: 3 5-4 0   HR: 30 beats above resting    RPE: 4-6   Modalities: Treadmill, Airdyne bike, UBE, NuStep and Recumbent bike    Strength trainin-3 days / week  12-15 repetitions  1-2 sets per modality   Will be added following 2-3 weeks of monitored exercise sessions   Modalities: Pull Downs, Lateral Raise, Arm Extension, Arm Curl and Front Raises    Home Exercise: none    Goals: 10% improvement in functional capacity - based on max METs achieved in fitness assessment, improved DASI score by 10%, Increase in exercise capacity by 40% - based on peak METs tolerated in cardiac rehab exercise session, Exercise 5 days/wk, >150mins/wk of moderate intensity exercise, Resume ADLs with increased strength, Attend Rehab regularly, Decrease sitting time and Start a walking program    Progression Toward Goals:  Pt is progressing and showing improvement  toward the following goals:  attending cardiac rehab regularly 3x/week, increasing strength and endurance with exercise, increased functional capacity shown increased MET levels          Education: benefit of exercise for CAD risk factors, home exercise guidelines, AHA guidelines to achieve >150 mins/wk of moderate exercise and RPE scale   Plan:education on home exercise guidelines, home exercise 30+ mins 2 days opposite CR and Education class: Risk Factors for Heart Disease  Readiness to change: Preparation:  (Getting ready to change) NUTRITION ASSESSMENT AND PLAN    Weight control:    Starting weight: 143 lb   Current weight:   148 lb  Waist circumference:    Startin 5"   Current:      Diabetes: N/A  A1c: 7 0    last measured: 2021    Lipid management: Discussed diet and lipid management and Last lipid profile 2021  Chol 155  TRG 46  HDL 59  LDL 87    Goals:LDL <100, HDL >40, TRG <150 and CHOL <200    Progression Toward Goals: Pt is progressing and showing improvement  toward the following goals:  following low fat diet, trying to increase fruits and vegetables daily          Education: heart healthy eating  low sodium diet  hydration  nutrition for  lipid management  target goal for A1c <7 0  Plan: Education class: Reading Food Labels and Education Class: Heart Healthy Eating  Readiness to change: Action:  (Changing behavior)      PSYCHOSOCIAL ASSESSMENT AND PLAN    Emotional:  Depression assessment:  PHQ-9 = 0 =No Depression            Anxiety measure:  HOMAR-7 = 0-4  = Not anxious  Self-reported stress level:  3  Social support: Excellent    Goals:  Physical Fitness in [place] Score < 3, Daily Activity in [facility] Score < 3, Social Activities in [place] Score < 3, [facility] in [facility] Score < 3 , Increased interest in doing things and increased energy    Progression Toward Goals: goal met no concerns with depression or anxiety    Education: signs/sxs of depression, benefits of a positive support system and stress management techniques  Plan: Class: Stress and Your Health and Class: Relaxation  Readiness to change: Action:  (Changing behavior)      OTHER CORE COMPONENTS     Tobacco:   Social History     Tobacco Use   Smoking Status Former Smoker    Types: Cigarettes    Quit date: Patricia Tamayo Years since quittin 7   Smokeless Tobacco Never Used       Tobacco Use Intervention:   N/A:  Patient is a non-smoker     Anginal Symptoms:  None   NTG use: No prescription    Blood pressure:    Resting: 110//78   Exercise: 112//72    Goals: consistent BP < 130/80, reduced dietary sodium <2300mg, moderate intensity exercise >150 mins/wk and medication compliance    Progression Toward Goals: Pt is progressing and showing improvement  toward the following goals:  BP stable within normal resting limits          Education:  understanding high blood pressure and it's relationship to CAD and low sodium diet and HTN  Plan: Class: Understanding Heart Disease and Class: Common Heart Medications  Readiness to change: Action:  (Changing behavior)

## 2022-07-06 ENCOUNTER — HOSPITAL ENCOUNTER (EMERGENCY)
Facility: HOSPITAL | Age: 77
Discharge: HOME | End: 2022-07-06
Attending: EMERGENCY MEDICINE | Admitting: EMERGENCY MEDICINE
Payer: MEDICARE

## 2022-07-06 ENCOUNTER — APPOINTMENT (EMERGENCY)
Dept: RADIOLOGY | Facility: HOSPITAL | Age: 77
End: 2022-07-06
Payer: MEDICARE

## 2022-07-06 VITALS
BODY MASS INDEX: 21.07 KG/M2 | HEIGHT: 68 IN | HEART RATE: 65 BPM | OXYGEN SATURATION: 100 % | TEMPERATURE: 97.3 F | SYSTOLIC BLOOD PRESSURE: 126 MMHG | WEIGHT: 139 LBS | RESPIRATION RATE: 13 BRPM | DIASTOLIC BLOOD PRESSURE: 62 MMHG

## 2022-07-06 DIAGNOSIS — R07.9 CHEST PAIN: Primary | ICD-10-CM

## 2022-07-06 LAB
2HR DELTA HS TROPONIN: 0 NG/L
ALBUMIN SERPL BCP-MCNC: 3.9 G/DL (ref 3.5–5)
ALP SERPL-CCNC: 88 U/L (ref 46–116)
ALT SERPL W P-5'-P-CCNC: 20 U/L (ref 12–78)
ANION GAP SERPL CALCULATED.3IONS-SCNC: 7 MMOL/L (ref 4–13)
AST SERPL W P-5'-P-CCNC: 20 U/L (ref 5–45)
ATRIAL RATE: 60 BPM
BASOPHILS # BLD AUTO: 0.04 THOUSANDS/ΜL (ref 0–0.1)
BASOPHILS NFR BLD AUTO: 1 % (ref 0–1)
BILIRUB SERPL-MCNC: 1 MG/DL (ref 0.2–1)
BUN SERPL-MCNC: 17 MG/DL (ref 5–25)
CALCIUM SERPL-MCNC: 9.3 MG/DL (ref 8.3–10.1)
CARDIAC TROPONIN I PNL SERPL HS: 6 NG/L
CARDIAC TROPONIN I PNL SERPL HS: 6 NG/L
CHLORIDE SERPL-SCNC: 101 MMOL/L (ref 100–108)
CO2 SERPL-SCNC: 28 MMOL/L (ref 21–32)
CREAT SERPL-MCNC: 1.04 MG/DL (ref 0.6–1.3)
D DIMER PPP FEU-MCNC: 0.35 UG/ML FEU
EOSINOPHIL # BLD AUTO: 0.15 THOUSAND/ΜL (ref 0–0.61)
EOSINOPHIL NFR BLD AUTO: 2 % (ref 0–6)
ERYTHROCYTE [DISTWIDTH] IN BLOOD BY AUTOMATED COUNT: 12.5 % (ref 11.6–15.1)
GFR SERPL CREATININE-BSD FRML MDRD: 68 ML/MIN/1.73SQ M
GLUCOSE SERPL-MCNC: 122 MG/DL (ref 65–140)
HCT VFR BLD AUTO: 39.6 % (ref 36.5–49.3)
HGB BLD-MCNC: 13.3 G/DL (ref 12–17)
IMM GRANULOCYTES # BLD AUTO: 0.01 THOUSAND/UL (ref 0–0.2)
IMM GRANULOCYTES NFR BLD AUTO: 0 % (ref 0–2)
LYMPHOCYTES # BLD AUTO: 2.72 THOUSANDS/ΜL (ref 0.6–4.47)
LYMPHOCYTES NFR BLD AUTO: 40 % (ref 14–44)
MCH RBC QN AUTO: 31.5 PG (ref 26.8–34.3)
MCHC RBC AUTO-ENTMCNC: 33.6 G/DL (ref 31.4–37.4)
MCV RBC AUTO: 94 FL (ref 82–98)
MONOCYTES # BLD AUTO: 0.64 THOUSAND/ΜL (ref 0.17–1.22)
MONOCYTES NFR BLD AUTO: 10 % (ref 4–12)
NEUTROPHILS # BLD AUTO: 3.2 THOUSANDS/ΜL (ref 1.85–7.62)
NEUTS SEG NFR BLD AUTO: 47 % (ref 43–75)
NRBC BLD AUTO-RTO: 0 /100 WBCS
P AXIS: 65 DEGREES
PLATELET # BLD AUTO: 210 THOUSANDS/UL (ref 149–390)
PMV BLD AUTO: 9.7 FL (ref 8.9–12.7)
POTASSIUM SERPL-SCNC: 4 MMOL/L (ref 3.5–5.3)
PR INTERVAL: 168 MS
PROT SERPL-MCNC: 7.1 G/DL (ref 6.4–8.2)
QRS AXIS: 97 DEGREES
QRSD INTERVAL: 82 MS
QT INTERVAL: 456 MS
QTC INTERVAL: 456 MS
RBC # BLD AUTO: 4.22 MILLION/UL (ref 3.88–5.62)
SODIUM SERPL-SCNC: 136 MMOL/L (ref 136–145)
T WAVE AXIS: 48 DEGREES
VENTRICULAR RATE: 60 BPM
WBC # BLD AUTO: 6.76 THOUSAND/UL (ref 4.31–10.16)

## 2022-07-06 PROCEDURE — 99285 EMERGENCY DEPT VISIT HI MDM: CPT | Performed by: EMERGENCY MEDICINE

## 2022-07-06 PROCEDURE — 85025 COMPLETE CBC W/AUTO DIFF WBC: CPT | Performed by: EMERGENCY MEDICINE

## 2022-07-06 PROCEDURE — 93010 ELECTROCARDIOGRAM REPORT: CPT | Performed by: INTERNAL MEDICINE

## 2022-07-06 PROCEDURE — 36415 COLL VENOUS BLD VENIPUNCTURE: CPT

## 2022-07-06 PROCEDURE — 99285 EMERGENCY DEPT VISIT HI MDM: CPT

## 2022-07-06 PROCEDURE — 85379 FIBRIN DEGRADATION QUANT: CPT | Performed by: EMERGENCY MEDICINE

## 2022-07-06 PROCEDURE — 84484 ASSAY OF TROPONIN QUANT: CPT | Performed by: EMERGENCY MEDICINE

## 2022-07-06 PROCEDURE — 80053 COMPREHEN METABOLIC PANEL: CPT | Performed by: EMERGENCY MEDICINE

## 2022-07-06 PROCEDURE — 71046 X-RAY EXAM CHEST 2 VIEWS: CPT

## 2022-07-06 PROCEDURE — 93005 ELECTROCARDIOGRAM TRACING: CPT

## 2022-07-06 NOTE — ED PROVIDER NOTES
History  Chief Complaint   Patient presents with    Chest Pain     Left sided pain starting this AM  Hx of 6 stents in heart, last being June 2021  This is a 50-year-old male who presents for evaluation of sharp left chest pain that started while walking at 3:00 p m  this afternoon denies any nausea vomiting or shortness of breath  He is a former smoker does have a prior history of heart disease hypertension diabetes and hyperlipidemia  Chest pain resolved on its own after 20 minutes  Feels different than previous MI  Patient thinks he may have been anxious  Denies any pain at this time and did take his medication earlier including aspirin  History provided by:  Patient  Medical Problem  Location:   left chest  Quality:   sharp pain  Severity:  Mild  Onset quality:  Sudden  Duration:  20 minutes  Progression:  Resolved  Context:   chest pain resolved spontaneously after 20 minutes  Relieved by:   time  Worsened by:   anxiety  Associated symptoms: chest pain        Prior to Admission Medications   Prescriptions Last Dose Informant Patient Reported? Taking?    Cranberry 500 MG CAPS Not Taking at Unknown time  Yes No   Sig: Take 500 mg by mouth daily   Patient not taking: Reported on 7/6/2022   EPINEPHrine (EPIPEN) 0 3 mg/0 3 mL SOAJ   No No   Sig: Inject 0 3 mL (0 3 mg total) into a muscle once for 1 dose   Multiple Vitamin (MULTIVITAMIN) capsule   Yes No   Sig: Take 1 capsule by mouth daily   Omega-3 Fatty Acids (FISH OIL) 1,000 mg   Yes No   Sig: Take 1,000 mg by mouth daily   aspirin 81 mg chewable tablet Not Taking at m  Yes No   Sig: Chew 81 mg daily   Patient not taking: Reported on 7/6/2022   atorvastatin (LIPITOR) 80 mg tablet   Yes No   Sig: Take 80 mg by mouth daily   co-enzyme Q-10 30 MG capsule   Yes No   Sig: Take 100 mg by mouth daily   diphenhydrAMINE (BENADRYL) 25 mg tablet Not Taking at Unknown time  No No   Sig: Take 2 tablets (50 mg total) by mouth every 8 (eight) hours as needed for itching   Patient not taking: No sig reported   ezetimibe (ZETIA) 10 mg tablet   Yes No   Sig: Take 10 mg by mouth daily   levothyroxine 25 mcg tablet   Yes No   Sig: Take 100 mcg by mouth daily   losartan (COZAAR) 25 mg tablet   No No   Sig: Take 1 tablet (25 mg total) by mouth daily   metoprolol tartrate (LOPRESSOR) 25 mg tablet   Yes No   Sig: Take 25 mg by mouth every 12 (twelve) hours   nitroglycerin (NITROSTAT) 0 4 mg SL tablet   No No   Sig: Place 1 tablet (0 4 mg total) under the tongue every 5 (five) minutes as needed for chest pain   ticagrelor (BRILINTA) 90 MG   No No   Sig: Take 1 tablet (90 mg total) by mouth every 12 (twelve) hours      Facility-Administered Medications: None       Past Medical History:   Diagnosis Date    Cardiac disease     CHF (congestive heart failure) ([place] [id number] )     Coronary artery disease     Disease of thyroid gland     Hematuria     Hyperlipidemia     Hypertension     Renal calculi     Type 2 diabetes mellitus ([place] [id number] ) 2021    UTI (urinary tract infection)        Past Surgical History:   Procedure Laterality Date    CARDIAC SURGERY      CORONARY ANGIOPLASTY WITH STENT PLACEMENT      CORONARY ARTERY BYPASS GRAFT      HERNIA REPAIR         Family History   Problem Relation Age of Onset    Heart disease Mother     Hypertension Mother     Lung cancer Father         smoker     I have reviewed and agree with the history as documented  E-Cigarette/Vaping    E-Cigarette Use Never User      E-Cigarette/Vaping Substances    Nicotine No     THC No     CBD No     Flavoring No      Social History     Tobacco Use    Smoking status: Former Smoker     Types: Cigarettes     Quit date:      Years since quittin 5    Smokeless tobacco: Never Used   Vaping Use    Vaping Use: Never used   Substance Use Topics    Alcohol use: No     Comment: socially    Drug use: No       Review of Systems   Cardiovascular: Positive for chest pain     Psychiatric/Behavioral: The patient is nervous/anxious  All other systems reviewed and are negative  Physical Exam  Physical Exam  Vitals and nursing note reviewed  Constitutional:       General: He is not in acute distress  Appearance: He is not ill-appearing, toxic-appearing or diaphoretic  HENT:      Head: Normocephalic and atraumatic  Right Ear: External ear normal       Left Ear: External ear normal       Nose: Nose normal    Eyes:      General: No scleral icterus  Right eye: No discharge  Left eye: No discharge  Extraocular Movements: Extraocular movements intact  Pupils: Pupils are equal, round, and reactive to light  Cardiovascular:      Rate and Rhythm: Normal rate and regular rhythm  Pulses: Normal pulses  Heart sounds: No murmur heard  No friction rub  No gallop  Pulmonary:      Effort: Pulmonary effort is normal  No respiratory distress  Breath sounds: No stridor  No wheezing, rhonchi or rales  Abdominal:      General: There is no distension  Palpations: Abdomen is soft  Tenderness: There is no abdominal tenderness  There is no guarding or rebound  Musculoskeletal:         General: No swelling, tenderness, deformity or signs of injury  Normal range of motion  Cervical back: Normal range of motion and neck supple  No rigidity or tenderness  Right lower leg: No edema  Left lower leg: No edema  Skin:     General: Skin is warm and dry  Coloration: Skin is not jaundiced  Findings: No bruising, erythema or rash  Neurological:      General: No focal deficit present  Mental Status: He is alert and oriented to person, place, and time  Cranial Nerves: No cranial nerve deficit  Sensory: No sensory deficit  Motor: No weakness  Coordination: Coordination normal       Gait: Gait normal    Psychiatric:         Behavior: Behavior normal          Thought Content:  Thought content normal          Vital Signs  ED Triage Vitals [07/06/22 1605]   Temperature Pulse Respirations Blood Pressure SpO2   (!) 97 3 °F (36 3 °C) 60 18 127/66 100 %      Temp Source Heart Rate Source Patient Position - Orthostatic VS BP Location FiO2 (%)   Temporal Monitor Lying Left arm --      Pain Score       4           Vitals:    07/06/22 1700 07/06/22 1730 07/06/22 1800 07/06/22 1845   BP: 131/62 123/63 129/63 126/62   Pulse: 59 62 62 65   Patient Position - Orthostatic VS:             Visual Acuity      ED Medications  Medications - No data to display    Diagnostic Studies  Results Reviewed     Procedure Component Value Units Date/Time    HS Troponin I 4hr [[id number]] Collected: 07/06/22 2006    Lab Status: In process Specimen: Blood from Arm, Right Updated: 07/06/22 2009    HS Troponin I 2hr [[id number]]  (Normal) Collected: 07/06/22 1802    Lab Status: Final result Specimen: Blood from Arm, Right Updated: 07/06/22 1843     hs TnI 2hr 6 ng/L      Delta 2hr hsTnI 0 ng/L     D-dimer, quantitative [[id number]]  (Normal) Collected: 07/06/22 1612    Lab Status: Final result Specimen: Blood from Arm, Left Updated: 07/06/22 1708     D-Dimer, Quant 0 35 ug/ml FEU     Narrative: In the evaluation for possible pulmonary embolism, in the appropriate (Well's Score of 4 or less) patient, the age adjusted d-dimer cutoff for this patient can be calculated as:    Age x 0 01 (in ug/mL) for Age-adjusted D-dimer exclusion threshold for a patient over 50 years      HS Troponin 0hr (reflex protocol) [[id number]]  (Normal) Collected: 07/06/22 1612    Lab Status: Final result Specimen: Blood from Arm, Left Updated: 07/06/22 1706     hs TnI 0hr 6 ng/L     Comprehensive metabolic panel [[id number]] Collected: 07/06/22 1612    Lab Status: Final result Specimen: Blood from Arm, Left Updated: 07/06/22 1658     Sodium 136 mmol/L      Potassium 4 0 mmol/L      Chloride 101 mmol/L      CO2 28 mmol/L      ANION GAP 7 mmol/L      BUN 17 mg/dL      Creatinine 1 04 mg/dL Glucose 122 mg/dL      Calcium 9 3 mg/dL      AST 20 U/L      ALT 20 U/L      Alkaline Phosphatase 88 U/L      Total Protein 7 1 g/dL      Albumin 3 9 g/dL      Total Bilirubin 1 00 mg/dL      eGFR 68 ml/min/1 73sq m     Narrative:      [place] guidelines for Chronic Kidney Disease (CKD):     Stage 1 with normal or high GFR (GFR > 90 mL/min/1 73 square meters)    Stage 2 Mild CKD (GFR = 60-89 mL/min/1 73 square meters)    Stage 3A Moderate CKD (GFR = 45-59 mL/min/1 73 square meters)    Stage 3B Moderate CKD (GFR = 30-44 mL/min/1 73 square meters)    Stage 4 Severe CKD (GFR = 15-29 mL/min/1 73 square meters)    Stage 5 End Stage CKD (GFR <15 mL/min/1 73 square meters)  Note: GFR calculation is accurate only with a steady state creatinine    CBC and differential [[id number]] Collected: 07/06/22 1612    Lab Status: Final result Specimen: Blood from Arm, Left Updated: 07/06/22 1634     WBC 6 76 Thousand/uL      RBC 4 22 Million/uL      Hemoglobin 13 3 g/dL      Hematocrit 39 6 %      MCV 94 fL      MCH 31 5 pg      MCHC 33 6 g/dL      RDW 12 5 %      MPV 9 7 fL      Platelets 679 Thousands/uL      nRBC 0 /100 WBCs      Neutrophils Relative 47 %      Immat GRANS % 0 %      Lymphocytes Relative 40 %      Monocytes Relative 10 %      Eosinophils Relative 2 %      Basophils Relative 1 %      Neutrophils Absolute 3 20 Thousands/µL      Immature Grans Absolute 0 01 Thousand/uL      Lymphocytes Absolute 2 72 Thousands/µL      Monocytes Absolute 0 64 Thousand/µL      Eosinophils Absolute 0 15 Thousand/µL      Basophils Absolute 0 04 Thousands/µL                  XR chest pa & lateral   ED Interpretation by Imelda Núñez DO (07/06 1657)   No acute infiltrate pneumothorax or widened mediastinum                 Procedures  ECG 12 Lead Documentation Only    Date/Time: 7/6/2022 4:40 PM  Performed by: Imelda Núñez DO  Authorized by: Imelda Núñez DO     ECG reviewed by me, the ED Provider: yes    Patient location:  ED  Rate:     ECG rate:  60  Rhythm:     Rhythm: sinus rhythm    Conduction:     Conduction: normal    T waves:     T waves: normal               ED Course  ED Course as of 07/06/22 2013 Wed Jul 06, 2022 2009  Delta troponin is 0 patient is pain-free follow up with his cardiologist             HEART Risk Score    Flowsheet Row Most Recent Value   Heart Score Risk Calculator    History 0 Filed at: 07/06/2022 1830   ECG 0 Filed at: 07/06/2022 1830   Age 2 Filed at: 07/06/2022 1830   Risk Factors 2 Filed at: 07/06/2022 1830   Troponin 0 Filed at: 07/06/2022 1830   HEART Score 4 Filed at: 07/06/2022 1830                        SBIRT 20yo+    Flowsheet Row Most Recent Value   SBIRT (23 yo +)    In order to provide better care to our patients, we are screening all of our patients for alcohol and drug use  Would it be okay to ask you these screening questions? No Filed at: 07/06/2022 1606                    [facility]  Number of Diagnoses or Management Options  Diagnosis management comments:  Chest pain rule out acute coronary syndrome versus anxiety musculoskeletal pain esophagitis workup in progress including EKG chest x-ray and lab work pain is currently resolved       Amount and/or Complexity of Data Reviewed  Clinical lab tests: ordered  Tests in the radiology section of CPT®: ordered        Disposition  Final diagnoses:   Chest pain     Time reflects when diagnosis was documented in both MDM as applicable and the Disposition within this note     Time User Action Codes Description Comment    7/6/2022  8:11 PM Young Sanders Add [R07 9] Chest pain       ED Disposition     ED Disposition   Discharge    Condition   Stable    Date/Time   Wed Jul 6, 2022  8:11 PM    Comment   Alem Leal discharge to home/self care                 Follow-up Information     Follow up With Specialties Details Why Contact Info Additional Anitha Flaherty [address] Emergency [phone] [id number]  [address] [facility] Emergency Department, [address] [facility] Princess Flores [address]    Grayson Keyes MD Family Medicine   [address] [place] [id number] [phone]       call your cardiologist Dr Maxine French tomorrow for close follow-up in the next few days               Patient's Medications   Discharge Prescriptions    No medications on file       No discharge procedures on file      PDMP Review     None          ED Provider  Electronically Signed by           Scottie Somers DO  07/06/22 2013

## 2022-12-11 ENCOUNTER — HOSPITAL ENCOUNTER (EMERGENCY)
Facility: HOSPITAL | Age: 77
Discharge: HOME | End: 2022-12-11
Attending: EMERGENCY MEDICINE

## 2022-12-11 VITALS
TEMPERATURE: 97.8 F | SYSTOLIC BLOOD PRESSURE: 129 MMHG | HEART RATE: 67 BPM | DIASTOLIC BLOOD PRESSURE: 65 MMHG | RESPIRATION RATE: 18 BRPM | OXYGEN SATURATION: 98 %

## 2022-12-11 DIAGNOSIS — R45.4 ANGER REACTION: Primary | ICD-10-CM

## 2022-12-11 DIAGNOSIS — F43.9 STRESS: ICD-10-CM

## 2022-12-12 NOTE — DISCHARGE INSTRUCTIONS
Make sure you are taking care of your mental health, if you begin to get angry walk away from the situation, deep breathing exercises  Schedule an appointment with [facility] for therapy   Follow up with your PCP  Return to the ER if you develop thoughts of hurting yourself or others, that you dont want to live anymore, attempt to hurt yourself, start to use alcohol, drugs to control your symptoms

## 2022-12-12 NOTE — ED PROVIDER NOTES
History  Chief Complaint   Patient presents with   • Depression     Pt to er with reports of getting upset today thinking that someone was breaking into his gun safe  Norman Specking SI/HI, has the guns for hunting  Never had thoughts of harming himself or anyone else in the past, or now  This is a 66 y/o male with PMH CAD, CHF, HLD, HTN, T2DM who presents to the ER today after becoming angry at home thinking someone was breaking into his gun case  He states his daughter was with him at the time and called 911 because she was scared that he was so angry  He states in the past he has punched himself in the jaw but did not do that today  He denies any thoughts today of hurting himself, killing himself, hurting others or killing others  He states he could never do that to himself or anyone  Denies ever being diagnosed with any mental health conditions in the past, going to therapy or being hospitalized for his mental health  He denies any hallucinations or delusions  States he does not trust his new landlords and thinks they might be breaking into the gun case, and he does not trust them with his guns  States he is either going to get a lock for the case or bring his guns elsewhere to solve this problem  He denies any medical complaints such as chest pain, shortness of breath, difficulty breathing, abdominal pain, nausea, vomiting, URI symptoms, fevers  Does not use any recreational drugs or alcohol  No known allergies to medications  History provided by:  Patient   used: No    Depression  Presenting symptoms: agitation (resolved)    Presenting symptoms: no self-mutilation, no suicidal thoughts, no suicidal threats and no suicide attempt    Degree of incapacity: none    Onset quality:  Sudden  Progression:  Resolved  Chronicity:  New  Context: not alcohol use, not drug abuse, not medication, not noncompliant, not recent medication change and not stressful life event    Ineffective treatments:  None tried  Associated symptoms: no abdominal pain, no anxiety, no chest pain, no decreased need for sleep, no feelings of worthlessness, no headaches and no irritability    Risk factors: no hx of mental illness and no hx of suicide attempts        Prior to Admission Medications   Prescriptions Last Dose Informant Patient Reported? Taking?    Cranberry 500 MG CAPS   Yes No   Sig: Take 500 mg by mouth daily   Patient not taking: Reported on 7/6/2022   EPINEPHrine (EPIPEN) 0 3 mg/0 3 mL SOAJ   No No   Sig: Inject 0 3 mL (0 3 mg total) into a muscle once for 1 dose   Multiple Vitamin (MULTIVITAMIN) capsule   Yes No   Sig: Take 1 capsule by mouth daily   Omega-3 Fatty Acids (FISH OIL) 1,000 mg   Yes No   Sig: Take 1,000 mg by mouth daily   aspirin 81 mg chewable tablet   Yes No   Sig: Chew 81 mg daily   Patient not taking: Reported on 7/6/2022   atorvastatin (LIPITOR) 80 mg tablet   Yes No   Sig: Take 80 mg by mouth daily   co-enzyme Q-10 30 MG capsule   Yes No   Sig: Take 100 mg by mouth daily   diphenhydrAMINE (BENADRYL) 25 mg tablet   No No   Sig: Take 2 tablets (50 mg total) by mouth every 8 (eight) hours as needed for itching   Patient not taking: No sig reported   ezetimibe (ZETIA) 10 mg tablet   Yes No   Sig: Take 10 mg by mouth daily   levothyroxine 25 mcg tablet   Yes No   Sig: Take 100 mcg by mouth daily   losartan (COZAAR) 25 mg tablet   No No   Sig: Take 1 tablet (25 mg total) by mouth daily   metoprolol tartrate (LOPRESSOR) 25 mg tablet   Yes No   Sig: Take 25 mg by mouth every 12 (twelve) hours   nitroglycerin (NITROSTAT) 0 4 mg SL tablet   No No   Sig: Place 1 tablet (0 4 mg total) under the tongue every 5 (five) minutes as needed for chest pain   ticagrelor (BRILINTA) 90 MG   No No   Sig: Take 1 tablet (90 mg total) by mouth every 12 (twelve) hours      Facility-Administered Medications: None       Past Medical History:   Diagnosis Date   • Cardiac disease    • CHF (congestive heart failure) (HCC)    • Coronary artery disease    • Disease of thyroid gland    • Hematuria    • Hyperlipidemia    • Hypertension    • Renal calculi    • Type 2 diabetes mellitus ([facility] [address] ) 2021   • UTI (urinary tract infection)        Past Surgical History:   Procedure Laterality Date   • CARDIAC SURGERY     • CORONARY ANGIOPLASTY WITH STENT PLACEMENT     • CORONARY ARTERY BYPASS GRAFT     • HERNIA REPAIR         Family History   Problem Relation Age of Onset   • Heart disease Mother    • Hypertension Mother    • Lung cancer Father         smoker     I have reviewed and agree with the history as documented  E-Cigarette/Vaping   • E-Cigarette Use Never User      E-Cigarette/Vaping Substances   • Nicotine No    • THC No    • CBD No    • Flavoring No      Social History     Tobacco Use   • Smoking status: Former     Types: Cigarettes     Quit date:      Years since quittin 9   • Smokeless tobacco: Never   Vaping Use   • Vaping Use: Never used   Substance Use Topics   • Alcohol use: No     Comment: socially   • Drug use: No       Review of Systems   Constitutional: Negative for chills, fever and irritability  HENT: Negative for congestion, rhinorrhea and sore throat  Respiratory: Negative for chest tightness and shortness of breath  Cardiovascular: Negative for chest pain  Gastrointestinal: Negative for abdominal pain, nausea and vomiting  Skin: Negative for color change  Neurological: Negative for headaches  Psychiatric/Behavioral: Positive for agitation (resolved) and depression  Negative for behavioral problems, self-injury, sleep disturbance and suicidal ideas  The patient is not nervous/anxious  All other systems reviewed and are negative  Physical Exam  Physical Exam  Vitals and nursing note reviewed  Constitutional:       General: He is awake  Appearance: Normal appearance  He is well-developed  HENT:      Head: Normocephalic and atraumatic        Right Ear: Tympanic membrane, ear canal and external ear normal       Left Ear: Tympanic membrane, ear canal and external ear normal       Nose: Nose normal       Mouth/Throat:      Mouth: Mucous membranes are moist       Pharynx: Oropharynx is clear  No oropharyngeal exudate or posterior oropharyngeal erythema  Eyes:      General: No scleral icterus  Extraocular Movements: Extraocular movements intact  Conjunctiva/sclera: Conjunctivae normal       Pupils: Pupils are equal, round, and reactive to light  Cardiovascular:      Rate and Rhythm: Normal rate and regular rhythm  Heart sounds: Normal heart sounds, S1 normal and S2 normal  No murmur heard  No gallop  Pulmonary:      Effort: Pulmonary effort is normal       Breath sounds: Normal breath sounds  No wheezing, rhonchi or rales  Musculoskeletal:         General: Normal range of motion  Cervical back: Normal range of motion  Skin:     General: Skin is warm and dry  Neurological:      General: No focal deficit present  Mental Status: He is alert and oriented to person, place, and time  Comments: Patient fully competent   Psychiatric:         Attention and Perception: Attention and perception normal  He does not perceive auditory or visual hallucinations  Mood and Affect: Mood and affect normal          Behavior: Behavior normal  Behavior is not agitated or aggressive  Behavior is cooperative  Thought Content: Thought content normal  Thought content is not paranoid or delusional  Thought content does not include homicidal or suicidal ideation  Thought content does not include homicidal or suicidal plan  Cognition and Memory: Cognition and memory normal          Judgment: Judgment normal       Comments: Patient calm, responsive on exam  No anger or agitation noted  Denies thoughts of hurting himself or others  No SI/HI            Vital Signs  ED Triage Vitals [12/11/22 1747]   Temperature Pulse Respirations Blood Pressure SpO2   97 8 °F (36 6 °C) 68 18 127/69 99 %      Temp Source Heart Rate Source Patient Position - Orthostatic VS BP Location FiO2 (%)   Temporal Monitor Lying Right arm --      Pain Score       --           Vitals:    12/11/22 1747 12/11/22 1800 12/11/22 1900   BP: 127/69 109/58 129/65   Pulse: 68 74 67   Patient Position - Orthostatic VS: Lying           Visual Acuity      ED Medications  Medications - No data to display    Diagnostic Studies  Results Reviewed     None                 No orders to display              Procedures  Procedures         ED Course               SBIRT 20yo+    Flowsheet Row Most Recent Value   SBIRT (23 yo +)    In order to provide better care to our patients, we are screening all of our patients for alcohol and drug use  Would it be okay to ask you these screening questions? Yes Filed at: 12/11/2022 1749   Initial Alcohol Screen: US AUDIT-C     1  How often do you have a drink containing alcohol? 0 Filed at: 12/11/2022 1749   2  How many drinks containing alcohol do you have on a typical day you are drinking? 0 Filed at: 12/11/2022 1749   3a  Male UNDER 65: How often do you have five or more drinks on one occasion? 0 Filed at: 12/11/2022 1749   3b  FEMALE Any Age, or MALE 65+: How often do you have 4 or more drinks on one occassion? 0 Filed at: 12/11/2022 1749   Audit-C Score 0 Filed at: 12/11/2022 1749   JAZ: How many times in the past year have you    Used an illegal drug or used a prescription medication for non-medical reasons? Never Filed at: 12/11/2022 1749                    MDM  Number of Diagnoses or Management Options  Anger reaction: new and does not require workup  Stress: new and does not require workup  Diagnosis management comments: 64 y/o male here for angry outburst at home resulting in 911 call and him being brought in to ER via ambulance  Clinical diagnosis: Patient fully competent and able to make own decisions, no thoughts or actions of hurting himself or anyone else   No desire for further psychiatric care such as signing a 201  Anger completely resolved prior to arrival to ER  Assessment: anger reaction, stress  Plan: discussed with patient how it is important to control his anger and pull himself out of these situations if he feels he is getting too angry  Referred him to [facility] for therapy and patient said he would set up an appointment and start going because he believes it would help him control his emotions  He was given care instructions for stress  He also  was given strict return to ER precautions both verbally and in discharge papers  Patient verbalized understanding and agrees with plan  Risk of Complications, Morbidity, and/or Mortality  Presenting problems: low  Diagnostic procedures: low  Management options: low    Patient Progress  Patient progress: resolved      Disposition  Final diagnoses:   Anger reaction   Stress     Time reflects when diagnosis was documented in both MDM as applicable and the Disposition within this note     Time User Action Codes Description Comment    12/11/2022  6:59 PM Abraham Montes Add [R45 4] Anger reaction     12/11/2022  7:03 PM Abraahm Montes Add [F43 9] Stress       ED Disposition     ED Disposition   Discharge    Condition   Stable    Date/Time   Sun Dec 11, 2022  6:58 PM    Comment   Wolfgang Phelan discharge to home/self care                 Follow-up Information     Follow up With Specialties Details Why Contact Info Additional [address] Outpatient Psychiatry Schedule an appointment as soon as possible for a visit   [address] [place] [address] Outpatient, [address] [place] [address], [phone], [phone]    Elenita Muir MD Family Medicine Schedule an appointment as soon as possible for a visit   [address] Department Emergency Medicine Go to  if you develop thoughts of hurting yourself or others, that you dont want to live anymore, attempt to hurt yourself, start to use alcohol, drugs to control your symptoms 100 [place], [id number] [phone] [address] Emergency Department, [address] [facility], [facility], [address]          Discharge Medication List as of 12/11/2022  7:03 PM      CONTINUE these medications which have NOT CHANGED    Details   aspirin 81 mg chewable tablet Chew 81 mg daily, Historical Med      atorvastatin (LIPITOR) 80 mg tablet Take 80 mg by mouth daily, Historical Med      co-enzyme Q-10 30 MG capsule Take 100 mg by mouth daily, Historical Med      Cranberry 500 MG CAPS Take 500 mg by mouth daily, Historical Med      diphenhydrAMINE (BENADRYL) 25 mg tablet Take 2 tablets (50 mg total) by mouth every 8 (eight) hours as needed for itching, Starting Mon 12/17/2018, Print      EPINEPHrine (EPIPEN) 0 3 mg/0 3 mL SOAJ Inject 0 3 mL (0 3 mg total) into a muscle once for 1 dose, Starting Mon 12/17/2018, Print      ezetimibe (ZETIA) 10 mg tablet Take 10 mg by mouth daily, Historical Med      levothyroxine 25 mcg tablet Take 100 mcg by mouth daily, Historical Med      losartan (COZAAR) 25 mg tablet Take 1 tablet (25 mg total) by mouth daily, Starting Mon 7/19/2021, Normal      metoprolol tartrate (LOPRESSOR) 25 mg tablet Take 25 mg by mouth every 12 (twelve) hours, Historical Med      Multiple Vitamin (MULTIVITAMIN) capsule Take 1 capsule by mouth daily, Historical Med      nitroglycerin (NITROSTAT) 0 4 mg SL tablet Place 1 tablet (0 4 mg total) under the tongue every 5 (five) minutes as needed for chest pain, Starting Sun 7/18/2021, Normal      Omega-3 Fatty Acids (FISH OIL) 1,000 mg Take 1,000 mg by mouth daily, Historical Med      ticagrelor (BRILINTA) 90 MG Take 1 tablet (90 mg total) by mouth every 12 (twelve) hours, Starting Fri 7/16/2021, Normal                 PDMP Review     None          ED Provider  Electronically Signed by           Shady Howell PA-C  12/11/22 8129

## 2023-01-23 ENCOUNTER — HOSPITAL ENCOUNTER (EMERGENCY)
Facility: HOSPITAL | Age: 78
Discharge: HOME | End: 2023-01-23
Attending: EMERGENCY MEDICINE

## 2023-01-23 VITALS
WEIGHT: 150 LBS | TEMPERATURE: 97.9 F | BODY MASS INDEX: 22.73 KG/M2 | DIASTOLIC BLOOD PRESSURE: 58 MMHG | HEIGHT: 68 IN | OXYGEN SATURATION: 99 % | SYSTOLIC BLOOD PRESSURE: 108 MMHG | HEART RATE: 62 BPM | RESPIRATION RATE: 18 BRPM

## 2023-01-23 DIAGNOSIS — N39.0 UTI (URINARY TRACT INFECTION): Primary | ICD-10-CM

## 2023-01-23 DIAGNOSIS — R31.9 HEMATURIA: ICD-10-CM

## 2023-01-23 LAB
BACTERIA UR QL AUTO: ABNORMAL /HPF
BILIRUB UR QL STRIP: NEGATIVE
CLARITY UR: ABNORMAL
COLOR UR: YELLOW
GLUCOSE UR STRIP-MCNC: ABNORMAL MG/DL
HGB UR QL STRIP.AUTO: ABNORMAL
KETONES UR STRIP-MCNC: NEGATIVE MG/DL
LEUKOCYTE ESTERASE UR QL STRIP: ABNORMAL
NITRITE UR QL STRIP: NEGATIVE
NON-SQ EPI CELLS URNS QL MICRO: ABNORMAL /HPF
PH UR STRIP.AUTO: 7 [PH]
PROT UR STRIP-MCNC: ABNORMAL MG/DL
RBC #/AREA URNS AUTO: ABNORMAL /HPF
SP GR UR STRIP.AUTO: 1.02 (ref 1–1.03)
UROBILINOGEN UR STRIP-ACNC: <2 MG/DL
WBC #/AREA URNS AUTO: ABNORMAL /HPF

## 2023-01-23 RX ORDER — CEPHALEXIN 500 MG/1
500 CAPSULE ORAL EVERY 6 HOURS SCHEDULED
Qty: 28 CAPSULE | Refills: 0 | Status: SHIPPED | OUTPATIENT
Start: 2023-01-23 | End: 2023-01-23 | Stop reason: SDUPTHER

## 2023-01-23 RX ORDER — CEPHALEXIN 500 MG/1
500 CAPSULE ORAL EVERY 6 HOURS SCHEDULED
Qty: 28 CAPSULE | Refills: 0 | Status: SHIPPED | OUTPATIENT
Start: 2023-01-23 | End: 2023-01-30

## 2023-01-23 RX ORDER — CEPHALEXIN 250 MG/1
500 CAPSULE ORAL ONCE
Status: COMPLETED | OUTPATIENT
Start: 2023-01-23 | End: 2023-01-23

## 2023-01-23 RX ADMIN — CEPHALEXIN 500 MG: 250 CAPSULE ORAL at 05:09

## 2023-01-23 NOTE — ED PROVIDER NOTES
History  Chief Complaint   Patient presents with   • Blood in Urine     Started this AM when pt went to urinate, passed a blood clot; still able to urinate but feels like he didn't empty his bladder all the way; has hx of blood in urine     64 yo M with pmh of CAD, CHF on asa/brilinta presents to ED with hematuria and sensation not able to empty bladder completely  Urinated this morning, had a blood clot so came in  Bladder scan on arrival negative  Pt in no distress  Gross hematuria on urine, no large clots  Pt denies abd pain, n/v or fevers  Denies back/flank pain  Has had uti in past, but it's been several years  No recent instrumentation  History provided by:  Patient and medical records   used: No    Blood in Urine  This is a recurrent problem  The current episode started more than 1 year ago  He describes the hematuria as gross hematuria  The hematuria occurs throughout his entire urinary stream  His pain is at a severity of 0/10  He is experiencing no pain  Obstructive symptoms include incomplete emptying  Associated symptoms include dysuria  Pertinent negatives include no abdominal pain, chills, fever, flank pain, inability to urinate, nausea or vomiting  Risk factors include anticoagulant  Prior to Admission Medications   Prescriptions Last Dose Informant Patient Reported? Taking?    Cranberry 500 MG CAPS   Yes No   Sig: Take 500 mg by mouth daily   Patient not taking: Reported on 7/6/2022   EPINEPHrine (EPIPEN) 0 3 mg/0 3 mL SOAJ   No No   Sig: Inject 0 3 mL (0 3 mg total) into a muscle once for 1 dose   Multiple Vitamin (MULTIVITAMIN) capsule   Yes No   Sig: Take 1 capsule by mouth daily   Omega-3 Fatty Acids (FISH OIL) 1,000 mg   Yes No   Sig: Take 1,000 mg by mouth daily   aspirin 81 mg chewable tablet   Yes No   Sig: Chew 81 mg daily   Patient not taking: Reported on 7/6/2022   atorvastatin (LIPITOR) 80 mg tablet   Yes No   Sig: Take 80 mg by mouth daily   co-enzyme Q-10 30 MG capsule   Yes No   Sig: Take 100 mg by mouth daily   diphenhydrAMINE (BENADRYL) 25 mg tablet   No No   Sig: Take 2 tablets (50 mg total) by mouth every 8 (eight) hours as needed for itching   Patient not taking: No sig reported   ezetimibe (ZETIA) 10 mg tablet   Yes No   Sig: Take 10 mg by mouth daily   levothyroxine 25 mcg tablet   Yes No   Sig: Take 100 mcg by mouth daily   losartan (COZAAR) 25 mg tablet   No No   Sig: Take 1 tablet (25 mg total) by mouth daily   metoprolol tartrate (LOPRESSOR) 25 mg tablet   Yes No   Sig: Take 25 mg by mouth every 12 (twelve) hours   nitroglycerin (NITROSTAT) 0 4 mg SL tablet   No No   Sig: Place 1 tablet (0 4 mg total) under the tongue every 5 (five) minutes as needed for chest pain   ticagrelor (BRILINTA) 90 MG   No No   Sig: Take 1 tablet (90 mg total) by mouth every 12 (twelve) hours      Facility-Administered Medications: None       Past Medical History:   Diagnosis Date   • Cardiac disease    • CHF (congestive heart failure) (HCC)    • Coronary artery disease    • Disease of thyroid gland    • Hematuria    • Hyperlipidemia    • Hypertension    • Renal calculi    • [facility]   • UTI (urinary tract infection)        Past Surgical History:   Procedure Laterality Date   • CARDIAC SURGERY     • CORONARY ANGIOPLASTY WITH STENT PLACEMENT     • CORONARY ARTERY BYPASS GRAFT     • HERNIA REPAIR         Family History   Problem Relation Age of Onset   • Heart disease Mother    • Hypertension Mother    • Lung cancer Father         smoker     I have reviewed and agree with the history as documented      E-Cigarette/Vaping   • E-Cigarette Use Never User      E-Cigarette/Vaping Substances   • Nicotine No    • THC No    • CBD No    • Flavoring No      Social History     Tobacco Use   • Smoking status: Former     Types: Cigarettes     Quit date:      Years since quittin 0   • Smokeless tobacco: Never   Vaping Use   • Vaping Use: Never used Substance Use Topics   • Alcohol use: No     Comment: socially   • Drug use: No       Review of Systems   Constitutional: Negative for chills, diaphoresis, fatigue, fever and unexpected weight change  HENT: Negative for congestion, ear pain, rhinorrhea, sore throat, trouble swallowing and voice change  Eyes: Negative for pain and visual disturbance  Respiratory: Negative for cough, chest tightness and shortness of breath  Cardiovascular: Negative for chest pain, palpitations and leg swelling  Gastrointestinal: Negative for abdominal pain, blood in stool, constipation, diarrhea, nausea and vomiting  Genitourinary: Positive for dysuria, hematuria and incomplete emptying  Negative for difficulty urinating and flank pain  Musculoskeletal: Negative for arthralgias, back pain and neck pain  Skin: Negative for rash  Neurological: Negative for dizziness, syncope, light-headedness and headaches  Psychiatric/Behavioral: Negative for confusion and suicidal ideas  The patient is not nervous/anxious  Physical Exam  Physical Exam  Vitals and nursing note reviewed  Constitutional:       General: He is not in acute distress  Appearance: He is well-developed  He is not diaphoretic  HENT:      Head: Normocephalic and atraumatic  Right Ear: External ear normal       Left Ear: External ear normal       Nose: Nose normal    Eyes:      General: No scleral icterus  Right eye: No discharge  Left eye: No discharge  Conjunctiva/sclera: Conjunctivae normal       Pupils: Pupils are equal, round, and reactive to light  Neck:      Vascular: No JVD  Trachea: No tracheal deviation  Cardiovascular:      Rate and Rhythm: Normal rate and regular rhythm  Heart sounds: Normal heart sounds  No murmur heard  No friction rub  No gallop  Pulmonary:      Effort: Pulmonary effort is normal  No respiratory distress  Breath sounds: Normal breath sounds  No stridor   No wheezing or rales  Chest:      Chest wall: No tenderness  Abdominal:      General: Bowel sounds are normal  There is no distension  Palpations: Abdomen is soft  Tenderness: There is no abdominal tenderness  There is no right CVA tenderness, left CVA tenderness, guarding or rebound  Musculoskeletal:         General: No tenderness or deformity  Normal range of motion  Cervical back: Normal range of motion and neck supple  Lymphadenopathy:      Cervical: No cervical adenopathy  Skin:     General: Skin is warm and dry  Findings: No rash  Neurological:      Mental Status: He is alert and oriented to person, place, and time  Cranial Nerves: No cranial nerve deficit  Sensory: No sensory deficit  Coordination: Coordination normal    Psychiatric:         Behavior: Behavior normal          Vital Signs  ED Triage Vitals [01/23/23 0423]   Temperature Pulse Respirations Blood Pressure SpO2   97 9 °F (36 6 °C) 62 18 108/58 99 %      Temp Source Heart Rate Source Patient Position - Orthostatic VS BP Location FiO2 (%)   Temporal Monitor Lying Right arm --      Pain Score       No Pain           Vitals:    01/23/23 0423   BP: 108/58   Pulse: 62   Patient Position - Orthostatic VS: Lying         Visual Acuity      ED Medications  Medications   cephalexin (KEFLEX) capsule 500 mg (has no administration in time range)       Diagnostic Studies  Results Reviewed     Procedure Component Value Units Date/Time    Urine Microscopic [[id number]]  (Abnormal) Collected: 01/23/23 0432    Lab Status: Final result Specimen: Urine, Clean Catch Updated: 01/23/23 0459     RBC, UA Innumerable /hpf      WBC, UA Innumerable /hpf      Epithelial Cells Occasional /hpf      Bacteria, UA Occasional /hpf     Urine culture [[id number]] Collected: 01/23/23 0432    Lab Status:  In process Specimen: Urine, Clean Catch Updated: 01/23/23 0459    UA w Reflex to Microscopic w Reflex to Culture [[id number]]  (Abnormal) Collected: 01/23/23 0432    Lab Status: Final result Specimen: Urine, Clean Catch Updated: 01/23/23 0458     Color, UA Yellow     Clarity, UA Cloudy     Specific [place], UA 1 020     pH, UA 7 0     Leukocytes, UA Moderate     Nitrite, UA Negative     Protein,  (3+) mg/dl      Glucose, UA 30 (3/100%) mg/dl      Ketones, UA Negative mg/dl      Urobilinogen, UA <2 0 mg/dl      Bilirubin, UA Negative     Occult Blood, UA Large                 No orders to display              Procedures  Procedures         ED Course  ED Course as of 01/23/23 0503   Mon Jan 23, 2023   0502 Pt with UTI  No s/s of pyelo/stone, and not retaining  Will d/c home  F/u with PCP and urology  RTED if sx worsen  Pt and daughter agree with plan  MDM    Disposition  Final diagnoses:   UTI (urinary tract infection)   Hematuria     Time reflects when diagnosis was documented in both MDM as applicable and the Disposition within this note     Time User Action Codes Description Comment    1/23/2023  5:02 AM [facility] S Add [N39 0] UTI (urinary tract infection)     1/23/2023  5:02 AM Frances Portage Add [R31 9] Hematuria       ED Disposition     ED Disposition   Discharge    Condition   Stable    Date/Time   Mon Jan 23, 2023  5:02 AM    Comment   Eddie Lizama discharge to home/self care                 Follow-up Information     Follow up With Specialties Details Why Contact Info Additional Information    Taya Spring MD Family Medicine Schedule an appointment as soon as possible for a visit   [facility] [phone]        [address] Emergency Department Emergency Medicine  If symptoms worsen [address] [address] [phone] [address] Emergency Department, [address] [facility], [address]    [facility] Urology Schedule an appointment as soon as possible for a visit   Hari Tapia [phone]  709  [facility] Urology [address] [place] [address] 96, [address], [address]          Patient's Medications   Discharge Prescriptions    CEPHALEXIN (KEFLEX) 500 MG CAPSULE    Take 1 capsule (500 mg total) by mouth every 6 (six) hours for 7 days       Start Date: 1/23/2023 End Date: 1/30/2023       Order Dose: 500 mg       Quantity: 28 capsule    Refills: 0    PHENAZOPYRIDINE (PYRIDIUM) 97 MG TABLET    Take 1 tablet (97 mg total) by mouth 3 (three) times a day as needed for bladder spasms       Start Date: 1/23/2023 End Date: --       Order Dose: 97 mg       Quantity: 10 tablet    Refills: 0       No discharge procedures on file      PDMP Review     None          ED Provider  Electronically Signed by           Darlin Diez MD  01/23/23 0641

## 2023-01-24 LAB — BACTERIA UR CULT: NORMAL

## 2023-02-09 NOTE — ASSESSMENT & PLAN NOTE
- patient has a history of a CABG x3 in 1992 and a stent placement in 2005 with 5 stents  - [place]iness with left-sided arm and anterior lateral chest discomfort  - troponin x3 with EKGs  - if needed will consult Cardiology for the a m   - chest pain observation HEART score 5   - patient's cardiologist is from [facility] Dr Arnaldo Cook [facility] - and Dr Kenroy James for the stent  Patient does have a history of carotid artery stenosis and gets regular checkup had a recent one not sure when Please review. Protocol failed or has no protocol. Scheduling- Please assist patient with making an appointment to receive further refills. Thank you. No future appointments. Requested Prescriptions   Pending Prescriptions Disp Refills    LISINOPRIL-HYDROCHLOROTHIAZIDE 20-12.5 MG Oral Tab [Pharmacy Med Name: LISINOPRIL-HCTZ 20/12.5MG TABLETS] 180 tablet 0     Sig: TAKE 2 TABLETS BY MOUTH DAILY       Hypertensive Medications Protocol Failed - 2/8/2023 12:56 PM        Failed - In person appointment in the past 12 or next 3 months     Recent Outpatient Visits              4 weeks ago [facility] ker[facility]    [facility]    9 months ago Noise effect on inner ear, unspecified laterality    [address] [place] [address] Ryanne Perea    Office Visit    1 year ago Routine physical examination    [address] [place] [address], Misty Mclean MD    Office Visit    1 year ago Perennial allergic rhinitis    [address]    Nurse Only    1 year ago Perennial allergic rhinitis    [facility] Beth Downey MD    Office Visit                      Failed - CMP or BMP in past 6 months     No results found for this or any previous visit (from the past 4392 hour(s)).             Failed - In person appointment or virtual visit in the past 6 months     Recent Outpatient Visits              4 weeks ago Seborrheic keratosis    Rosalinda Peoples MD    Office Visit    9 months ago Noise effect on inner ear, unspecified laterality    [address] [place] [address] Ryanne Perea    Office Visit    1 year ago Routine physical examination    Sneha Geller MD Office Visit    1 year ago Perennial allergic rhinitis    [facility], [address]    Nurse Only    1 year ago Perennial allergic rhinitis    [facility], [address] MD    Office Visit                      Failed - EGFRCR or GFRNAA > 50     GFR Evaluation            Passed - Last BP reading less than 140/90     BP Readings from Last 1 Encounters:  02/04/22 : 116/80                   Recent Outpatient Visits              4 weeks ago Seborrheic keratosis    Libertad Beyer MD    Office Visit    9 months ago Noise effect on inner ear, unspecified laterality    [address] [place] [address] Ryanne Lagunas    Office Visit    1 year ago Routine physical examination    [address] [place] [address] [address] Albertina Lai MD    Office Visit    1 year ago Perennial allergic rhinitis    [address], [address]    Nurse Only    1 year ago Perennial allergic rhinitis    Libertad Beyer MD    Office Visit

## 2023-04-28 ENCOUNTER — TELEPHONE (OUTPATIENT)
Dept: PSYCHIATRY | Facility: CLINIC | Age: 78
End: 2023-04-28

## 2023-04-28 NOTE — TELEPHONE ENCOUNTER
Called pt in regards to routine referral received from [place] office  Writer called and call was not able to be completed at the time  Writer will try to call again next week  Referral is from Dec 2022